# Patient Record
Sex: FEMALE | Race: BLACK OR AFRICAN AMERICAN | NOT HISPANIC OR LATINO | Employment: FULL TIME | URBAN - METROPOLITAN AREA
[De-identification: names, ages, dates, MRNs, and addresses within clinical notes are randomized per-mention and may not be internally consistent; named-entity substitution may affect disease eponyms.]

---

## 2018-10-02 ENCOUNTER — HOSPITAL ENCOUNTER (EMERGENCY)
Facility: HOSPITAL | Age: 28
Discharge: HOME | End: 2018-10-02
Attending: EMERGENCY MEDICINE
Payer: COMMERCIAL

## 2018-10-02 VITALS
TEMPERATURE: 99 F | HEART RATE: 72 BPM | SYSTOLIC BLOOD PRESSURE: 123 MMHG | RESPIRATION RATE: 12 BRPM | WEIGHT: 182.75 LBS | DIASTOLIC BLOOD PRESSURE: 62 MMHG | OXYGEN SATURATION: 100 % | HEIGHT: 66 IN | BODY MASS INDEX: 29.37 KG/M2

## 2018-10-02 DIAGNOSIS — R55 SYNCOPE: Primary | ICD-10-CM

## 2018-10-02 LAB
ALBUMIN SERPL BCP-MCNC: 3.8 G/DL
ALP SERPL-CCNC: 50 U/L
ALT SERPL W/O P-5'-P-CCNC: 14 U/L
ANION GAP SERPL CALC-SCNC: 7 MMOL/L
AST SERPL-CCNC: 16 U/L
B-HCG UR QL: NEGATIVE
BASOPHILS # BLD AUTO: 0 K/UL
BASOPHILS NFR BLD: 0.4 %
BILIRUB SERPL-MCNC: 0.3 MG/DL
BUN SERPL-MCNC: 11 MG/DL
CALCIUM SERPL-MCNC: 9.5 MG/DL
CHLORIDE SERPL-SCNC: 107 MMOL/L
CO2 SERPL-SCNC: 25 MMOL/L
CREAT SERPL-MCNC: 0.8 MG/DL
CTP QC/QA: YES
DIFFERENTIAL METHOD: ABNORMAL
EOSINOPHIL # BLD AUTO: 0.3 K/UL
EOSINOPHIL NFR BLD: 4.6 %
ERYTHROCYTE [DISTWIDTH] IN BLOOD BY AUTOMATED COUNT: 15.3 %
EST. GFR  (AFRICAN AMERICAN): >60 ML/MIN/1.73 M^2
EST. GFR  (NON AFRICAN AMERICAN): >60 ML/MIN/1.73 M^2
GLUCOSE SERPL-MCNC: 99 MG/DL
HCT VFR BLD AUTO: 37.6 %
HGB BLD-MCNC: 12.4 G/DL
LYMPHOCYTES # BLD AUTO: 2.1 K/UL
LYMPHOCYTES NFR BLD: 34.5 %
MCH RBC QN AUTO: 27.7 PG
MCHC RBC AUTO-ENTMCNC: 33 G/DL
MCV RBC AUTO: 84 FL
MONOCYTES # BLD AUTO: 0.8 K/UL
MONOCYTES NFR BLD: 12.8 %
NEUTROPHILS # BLD AUTO: 2.9 K/UL
NEUTROPHILS NFR BLD: 47.7 %
PLATELET # BLD AUTO: 391 K/UL
PMV BLD AUTO: 7.4 FL
POTASSIUM SERPL-SCNC: 3.8 MMOL/L
PROT SERPL-MCNC: 7.4 G/DL
RBC # BLD AUTO: 4.47 M/UL
SODIUM SERPL-SCNC: 139 MMOL/L
TSH SERPL DL<=0.005 MIU/L-ACNC: 0.91 UIU/ML
WBC # BLD AUTO: 6.2 K/UL

## 2018-10-02 PROCEDURE — 93010 ELECTROCARDIOGRAM REPORT: CPT | Mod: ,,, | Performed by: INTERNAL MEDICINE

## 2018-10-02 PROCEDURE — 80053 COMPREHEN METABOLIC PANEL: CPT

## 2018-10-02 PROCEDURE — 25000003 PHARM REV CODE 250: Performed by: EMERGENCY MEDICINE

## 2018-10-02 PROCEDURE — 99285 EMERGENCY DEPT VISIT HI MDM: CPT | Mod: 25

## 2018-10-02 PROCEDURE — 93005 ELECTROCARDIOGRAM TRACING: CPT

## 2018-10-02 PROCEDURE — 84443 ASSAY THYROID STIM HORMONE: CPT

## 2018-10-02 PROCEDURE — 81025 URINE PREGNANCY TEST: CPT | Performed by: PHYSICIAN ASSISTANT

## 2018-10-02 PROCEDURE — 36415 COLL VENOUS BLD VENIPUNCTURE: CPT

## 2018-10-02 PROCEDURE — 85025 COMPLETE CBC W/AUTO DIFF WBC: CPT

## 2018-10-02 RX ORDER — ACETAMINOPHEN 500 MG
1000 TABLET ORAL
Status: COMPLETED | OUTPATIENT
Start: 2018-10-02 | End: 2018-10-02

## 2018-10-02 RX ADMIN — ACETAMINOPHEN 1000 MG: 500 TABLET ORAL at 02:10

## 2018-10-02 NOTE — ED NOTES
Upon discharge, patient is AAOx4, no cardiac or respiratory complications. Follow up care reviewed with patient and has been instructed to return to the ER if needed. Patient verbalized understanding and ambulated to the lobby without difficulty. CHAYO MASSEY

## 2018-10-02 NOTE — ED NOTES
OTILIA Mondragon is at the bedside of patient. Patient has been updated on plan of care and results. No needs or questions at this time.

## 2018-10-02 NOTE — ED PROVIDER NOTES
"Encounter Date: 10/2/2018    SCRIBE #1 NOTE: I, Slime Gambino, am scribing for, and in the presence of, Alanna Cannon PA-C.       History     Chief Complaint   Patient presents with    syncopal episode     States "I think I had a panic attack"       Time seen by provider: 2:12 PM on 10/02/2018    Arnie Mei is a 28 y.o. female with HSV, vaso-vagal syncope (last occurrence ), and ASD who presents to the ED after a syncopal event. Patient states she was in the middle of a panic attack and she fainted. She states that she has had panic attacks before and alike previous episodes she felt "jittery, chest heaviness, and hear my pulse", albeit fainting, which all have resolved at this time. Patient notes that she typically is able to calm herself down and that they have been more frequent lately. She reports that today she fell from a standing position and woke up on the floor and complains of only a headache at this time. Her fall was witnessed and witnesses deny any seizure like activity per the patient. Patient denies any shortness of breath, pain in her calves, lower leg swelling, numbness, weakness, changes in vision, changes in speech, changes in ambulation, cough, dizziness, vomiting, diarrhea, chest pain, or fever. Patient denies being on any medication for anxiety now, however has in the past.       The history is provided by the patient. No  was used.     Review of patient's allergies indicates:  No Known Allergies  Past Medical History:   Diagnosis Date    Chest pain     Herpes simplex without mention of complication     History of Amplatzer atrial septal defect closure 2016    2006    History of atrial septal defect 2016    Migraine     Sickle cell trait     Vaso vagal episode     Vasovagal syncope 2016     Past Surgical History:   Procedure Laterality Date    CARDIAC CATHETERIZATION      for a previous murmur     SECTION      " DELIVERY-[facility] SECTION N/A 5/11/2016    Performed by Julissa Munoz MD at [facility] L&D     Family History   Problem Relation Age of Onset    Arthritis Father     Hypertension Father     Stroke Father     Heart disease Father      Social History     Tobacco Use    Smoking status: Never Smoker    Smokeless tobacco: Never Used   Substance Use Topics    Alcohol use: No    Drug use: No     Review of Systems   Constitutional: Negative for chills and fever.   HENT: Negative for drooling, sore throat and voice change.    Eyes: Negative for photophobia and visual disturbance.   Respiratory: Positive for chest tightness (resolved, associated to anxiety). Negative for cough and shortness of breath.    Cardiovascular: Negative for chest pain, palpitations and leg swelling.   Gastrointestinal: Negative for abdominal pain, diarrhea, nausea and vomiting.   Genitourinary: Negative for dysuria and hematuria.   Musculoskeletal: Negative for back pain and neck pain.   Skin: Negative for rash.   Neurological: Positive for syncope and headaches. Negative for dizziness, tremors, seizures, speech difficulty, weakness and numbness.   Hematological: Does not bruise/bleed easily.   Psychiatric/Behavioral: Negative for confusion. The patient is nervous/anxious.        Physical Exam     Initial Vitals [10/02/18 1345]   BP Pulse Resp Temp SpO2   126/80 97 12 98.6 °F (37 °C) 97 %      MAP       --         Physical Exam    Nursing note and vitals reviewed.  Constitutional: She appears well-developed and well-nourished. She is cooperative.  Non-toxic appearance. She does not have a sickly appearance.   HENT:   Head: Normocephalic and atraumatic.   Right Ear: External ear normal.   Left Ear: External ear normal.   Nose: Nose normal.   Mouth/Throat: Oropharynx is clear and moist.   Eyes: Conjunctivae, EOM and lids are normal. Pupils are equal, round, and reactive to light.   Neck: Normal range of motion and full passive range of motion  without pain. Neck supple.   Cardiovascular: Normal rate, regular rhythm, normal heart sounds and intact distal pulses. Exam reveals no gallop and no friction rub.    No murmur heard.  Pulmonary/Chest: Breath sounds normal. She has no wheezes. She has no rhonchi. She has no rales.   Abdominal: Soft. Normal appearance. There is no tenderness. There is no rigidity, no rebound and no guarding.   Musculoskeletal: She exhibits no edema or tenderness.   Neurological: She is alert and oriented to person, place, and time. She has normal strength. No cranial nerve deficit or sensory deficit. GCS score is 15. GCS eye subscore is 4. GCS verbal subscore is 5. GCS motor subscore is 6.   Negative Romberg. Normal gait. 5/5 strength and normal sensation of all extremities. CN III-XII intact.    Skin: Skin is warm, dry and intact. Capillary refill takes less than 2 seconds. No rash noted.         ED Course   Procedures  Labs Reviewed   CBC W/ AUTO DIFFERENTIAL - Abnormal; Notable for the following components:       Result Value    RDW 15.3 (*)     Platelets 391 (*)     MPV 7.4 (*)     All other components within normal limits   COMPREHENSIVE METABOLIC PANEL - Abnormal; Notable for the following components:    Alkaline Phosphatase 50 (*)     Anion Gap 7 (*)     All other components within normal limits   TSH   POCT URINE PREGNANCY        ECG Results          EKG 12-lead (Preliminary result)  Result time 10/02/18 14:23:38    ED Interpretation by Chase Villafana MD (10/02/18 14:23:38)    Sinus rhythm 73 beats per minute no ST elevation or depression or T-wave inversion                            Imaging Results          CT Head Without Contrast (Final result)  Result time 10/02/18 14:55:29    Final result by Ronny Paulino MD (10/02/18 14:55:29)                 Impression:      No acute intracranial process.  No significant change.      Electronically signed by: Ronny Paulino MD  Date:    10/02/2018  Time:    14:55              Narrative:    EXAMINATION:  CT HEAD WITHOUT CONTRAST    CLINICAL HISTORY:  fall hit head;    TECHNIQUE:  Low dose axial images were obtained through the head.  Coronal and sagittal reformations were also performed. Contrast was not administered.    COMPARISON:  04/07/2015    FINDINGS:  Gray-white differentiation is well maintained.  There is no intracranial hemorrhage.  There is no mass or midline shift.  The ventricles are nondilated.  The calvarium is intact.                                 Medical Decision Making:   History:   Old Medical Records: I decided to obtain old medical records.  Clinical Tests:   Lab Tests: Ordered and Reviewed  Radiological Study: Ordered and Reviewed  Medical Tests: Ordered and Reviewed       APC / Resident Notes:   Urgent evaluation of a 28-year-old female who presents with a syncopal episode prior to arrival.  At this time she reports a headache but no other complaints. Her neurological exam is normal. She denies any symptoms at this time. She reports history of vasovagal syncope. She also reports history of anxiety. She is not tachycardic or hypoxic. Doubt PE. EKG shows no acute changes. Labs are unremarkable. Discussed results with patient. Return precautions given. Based on my clinical evaluation, I do not appreciate any immediate, emergent, or life threatening condition or etiology that warrants additional workup today and feel that the patient can be discharged with close follow up care.  Patient is to follow up with their primary care provider. Case was discussed with Dr. Villafana who has evaluated the patient and is in agreement with the plan of care. All questions answered.          Scribe Attestation:   Scribe #1: I performed the above scribed service and the documentation accurately describes the services I performed. I attest to the accuracy of the note.    Attending Attestation:     Physician Attestation Statement for NP/PA:   I have conducted a face to face encounter  with this patient in addition to the NP/PA, due to Medical Complexity    Other NP/PA Attestation Additions:      Medical Decision Making: I provided a face to face evaluation of this patient.  I discussed the patient's care with Advanced Practice Clinician.  I reviewed their note and agree with the history, physical, assessment, diagnosis, treatment, and discharge plan provided by the Advanced Practice Clinician. My overall impression is syncope.  The patient has been instructed to follow up with their physician or the one provided as well as specific return precautions.            I, Alanna Back PA-C, personally performed the services described in this documentation. All medical record entries made by the scribe were at my direction and in my presence.  I have reviewed the chart and agree that the record reflects my personal performance and is accurate and complete. Alanna Back PA-C.  5:17 PM 10/02/2018          ED Course as of Oct 02 1730   Tue Oct 02, 2018   1422 Preg Test, Ur: Negative [EF]   1422 BP: 126/80 [EF]   1422 Temp: 98.6 °F (37 °C) [EF]   1422 Temp src: Oral [EF]   1422 Pulse: 97 [EF]   1422 Resp: 12 [EF]   1422 Resp: 12 [EF]   1422 SpO2: 97 % [EF]   1517 CT Head Without Contrast [EF]      ED Course User Index  [EF] Chase Villafana MD     Clinical Impression:   The encounter diagnosis was Syncope.      Disposition:   Disposition: Discharged  Condition: Stable              Alanna Back PA-C  10/02/18 1730       Chase Villafana MD  10/02/18 1740

## 2018-10-02 NOTE — ED NOTES
"Presents to the ER with c/o syncopal episode that occurred just prior to arrival. Patient reports a history of vasovagal syndrome since the age of 12. Patient reports syncopal episodes are usually associated with panic attacks. Patient started to feel jittery, anxious and "Could hear my heart beat." before episode. Patient was told by a bystander that she fell back hitting her head on the concrete. Patient reports a brief LOC. Associated complaints are generalized headache. Patient moves all extremities without difficulty. Mucous membranes are pink and moist. Skin is warm, dry and intact. Lungs are clear bilaterally, respirations are regular and unlabored. Denies cough, congestion, rhinorrhea or SOB. BS active x4, no tenderness with palpation, abd is soft and not distended. Denies any appetite or activity change. S1S2, capillary refill is < 2 seconds. Denies dysuria, difficulty urinating, frequency, numbness, tingling or weakness. SHILPA JINS    "

## 2018-10-02 NOTE — DISCHARGE INSTRUCTIONS
Follow up with your primary care provider.  For worsening symptoms, chest pain, shortness of breath, increased abdominal pain, high grade fever, stroke or stroke like symptoms, immediately go to the nearest Emergency Room or call 911 as soon as possible.

## 2018-11-19 ENCOUNTER — OFFICE VISIT (OUTPATIENT)
Dept: FAMILY MEDICINE | Facility: CLINIC | Age: 28
End: 2018-11-19
Payer: COMMERCIAL

## 2018-11-19 VITALS
HEIGHT: 66 IN | OXYGEN SATURATION: 97 % | WEIGHT: 187.31 LBS | HEART RATE: 66 BPM | RESPIRATION RATE: 14 BRPM | DIASTOLIC BLOOD PRESSURE: 84 MMHG | TEMPERATURE: 99 F | SYSTOLIC BLOOD PRESSURE: 130 MMHG | BODY MASS INDEX: 30.1 KG/M2

## 2018-11-19 DIAGNOSIS — Z23 NEED FOR INFLUENZA VACCINATION: ICD-10-CM

## 2018-11-19 DIAGNOSIS — Z00.00 ROUTINE HEALTH MAINTENANCE: Primary | ICD-10-CM

## 2018-11-19 PROCEDURE — 90471 IMMUNIZATION ADMIN: CPT | Mod: ,,, | Performed by: NURSE PRACTITIONER

## 2018-11-19 PROCEDURE — 99385 PREV VISIT NEW AGE 18-39: CPT | Mod: 25,,, | Performed by: NURSE PRACTITIONER

## 2018-11-19 PROCEDURE — 90686 IIV4 VACC NO PRSV 0.5 ML IM: CPT | Mod: ,,, | Performed by: NURSE PRACTITIONER

## 2018-11-19 RX ORDER — VALACYCLOVIR HYDROCHLORIDE 500 MG/1
1 TABLET, FILM COATED ORAL 2 TIMES DAILY
Refills: 0 | COMMUNITY
Start: 2018-10-30 | End: 2018-12-18

## 2018-11-19 NOTE — PROGRESS NOTES
SUBJECTIVE:      Patient ID: Arnie Mei is a 28 y.o. female.    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 Cheatham ([facility]) s/p ASD closure but didn't see him this year, diagnosed after closure with vaso-vagal syncope    Saw Dr. Go in past for migraines, was placed on sumatriptan but hasn't taken in quite some time, takes OTC meds with mild relief when she gets HA now    Discussed outstanding health maintenance - upcoming pap with Dr. Munoz 18        Past Surgical History:   Procedure Laterality Date    CARDIAC CATHETERIZATION      for a previous murmur     SECTION  ,,    COLON SURGERY  2014    DELIVERY-CEASAREAN SECTION N/A 2016    [facility] at [facility]D    TUBAL LIGATION  2016     Family History   Problem Relation Age of Onset    Arthritis Father     Hypertension Father     Stroke Father     Heart disease Father     No Known Problems Mother     Asthma Sister     Asthma Daughter     Cancer Paternal Grandmother         breast and bone cancer    Cancer Paternal Grandfather         Colon cancer    Diabetes Paternal Grandfather     No Known Problems Daughter     No Known Problems Daughter       Social History     Socioeconomic History    Marital status: Single     Spouse name: None    Number of children: None    Years of education: None    Highest education level: None   Social Needs    Financial resource strain: None    Food insecurity - worry: None    Food insecurity - inability: None    Transportation needs - medical: None    Transportation needs -  non-medical: None   Occupational History    None   Tobacco Use    Smoking status: Never Smoker    Smokeless tobacco: Never Used   Substance and Sexual Activity    Alcohol use: No    Drug use: No    Sexual activity: Yes     Partners: Male     Birth control/protection: None   Other Topics Concern    None   Social History Narrative    None     Current Outpatient Medications   Medication Sig Dispense Refill    valACYclovir (VALTREX) 500 MG tablet Take 1 tablet by mouth 2 (two) times daily.  0     No current facility-administered medications for this visit.      Review of patient's allergies indicates:  No Known Allergies   Past Medical History:   Diagnosis Date    Anxiety     Chest pain     Herpes simplex without mention of complication     History of Amplatzer atrial septal defect closure 2016    2006    History of atrial septal defect 2016    Migraine     Sickle cell trait     Vaso vagal episode     Vasovagal syncope 2016     Past Surgical History:   Procedure Laterality Date    CARDIAC CATHETERIZATION      for a previous murmur     SECTION  ,,    COLON SURGERY  2014    DELIVERY-CEASAREAN SECTION N/A 2016    [email or web address] L[facility]D    TUBAL LIGATION  2016       Review of Systems   Constitutional: Negative for activity change, appetite change, fatigue and unexpected weight change.   HENT: Negative for congestion, ear pain, hearing loss, postnasal drip, sinus pressure, sinus pain, sneezing and sore throat.    Eyes: Negative for photophobia and pain.   Respiratory: Negative for cough, chest tightness, shortness of breath and wheezing.    Cardiovascular: Negative for chest pain, palpitations and leg swelling.   Gastrointestinal: Negative for abdominal distention, abdominal pain, blood in stool, constipation, diarrhea, nausea and vomiting.   Endocrine: Negative for cold intolerance, heat intolerance, polydipsia and polyuria.  "  Genitourinary: Negative for difficulty urinating, dysuria, flank pain, frequency, hematuria, pelvic pain and urgency.   Musculoskeletal: Negative for arthralgias, back pain, joint swelling, myalgias and neck pain.   Skin: Negative for pallor.   Allergic/Immunologic: Negative for environmental allergies and food allergies.   Neurological: Positive for syncope (in past, states RT stress/anxiety) and headaches. Negative for dizziness, weakness, light-headedness and numbness.   Hematological: Does not bruise/bleed easily.   Psychiatric/Behavioral: Negative for agitation, confusion, decreased concentration and sleep disturbance. The patient is nervous/anxious.       OBJECTIVE:      Vitals:    11/19/18 1033   BP: 130/84   Pulse: 66   Resp: 14   Temp: 98.6 °F (37 °C)   SpO2: 97%   Weight: 85 kg (187 lb 4.8 oz)   Height: 5' 6" (1.676 m)     Physical Exam   Constitutional: She is oriented to person, place, and time. Vital signs are normal. She appears well-developed and well-nourished. No distress.   obese   HENT:   Head: Normocephalic and atraumatic.   Right Ear: Hearing normal.   Left Ear: Hearing normal.   Nose: Nose normal. No rhinorrhea.   Mouth/Throat: Mucous membranes are normal.   Eyes: Conjunctivae and lids are normal. Pupils are equal, round, and reactive to light. Right eye exhibits no discharge. Left eye exhibits no discharge. Right conjunctiva is not injected. Left conjunctiva is not injected. Right pupil is round and reactive. Left pupil is round and reactive. Pupils are equal.   Neck: Trachea normal and normal range of motion. Neck supple. No JVD present. No tracheal deviation present. No thyromegaly present.   Cardiovascular: Normal rate, regular rhythm, normal heart sounds and intact distal pulses. Exam reveals no gallop and no friction rub.   No murmur heard.  Pulses:       Radial pulses are 2+ on the right side, and 2+ on the left side.   Pulmonary/Chest: Effort normal and breath sounds normal. No " stridor. No respiratory distress. She has no decreased breath sounds. She has no wheezes. She has no rhonchi. She has no rales.   Abdominal: Soft. Bowel sounds are normal. She exhibits no distension. There is no tenderness. There is no rigidity and no guarding.   Musculoskeletal: Normal range of motion. She exhibits no edema.   Lymphadenopathy:     She has no cervical adenopathy.   Neurological: She is alert and oriented to person, place, and time. She has normal strength. She displays no atrophy. She displays a negative Romberg sign. Coordination and gait normal. GCS eye subscore is 4. GCS verbal subscore is 5. GCS motor subscore is 6.   Skin: Skin is warm and dry. Capillary refill takes less than 2 seconds. No lesion and no rash noted. No cyanosis. No pallor.   Psychiatric: She has a normal mood and affect. Her speech is normal and behavior is normal. Judgment and thought content normal. Cognition and memory are normal. She is attentive.   Nursing note and vitals reviewed.     Assessment:       1. Routine health maintenance    2. Need for influenza vaccination        Plan:       Routine health maintenance  -     Lipid panel; Future; Expected date: 11/19/2018  - reviewed CBC, CMP, TSH from recent hospital stay for syncopal episode    Need for influenza vaccination  -     Influenza - Quadrivalent (3 years & older) (PF)    Patient doesn't want to address anxiety or HA issues at this time. Wants to wait for lab results. Will revisit at next appt.    Follow-up in about 4 weeks (around 12/17/2018) for anxiety & HA med assess.      11/23/2018 AMALIA Gonzalez, FNP-C

## 2018-11-19 NOTE — PATIENT INSTRUCTIONS
Prevention Guidelines, Women Ages 18 to 39  Screening tests and vaccines are an important part of managing your health. Health counseling is essential, too. Below are guidelines for these, for women ages 18 to 39. Talk with your healthcare provider to make sure youre up-to-date on what you need.  Screening Who needs it How often   Alcohol misuse All women in this age group At routine exams   Blood pressure All women in this age group Every 2 years if your blood pressure is less than 120/80 mm Hg; yearly if your systolic blood pressure is 120 to 139 mm Hg, or your diastolic blood pressure reading is 80 to 89 mm Hg   Breast cancer All women in this age group should talk with their healthcare providers about the need for clinical breast exams (CBE)1 Clinical breast exam every 3 years1   Cervical cancer Women ages 21 and older Women between ages 21 and 29 should have a Pap test every 3 years; women between ages 30 and 65 are advised to have a Pap test plus an HPV test every 5 years   Chlamydia Sexually active women ages 24 and younger, and women at increased risk for infection Every 3 years if you're at risk or have symptoms   Depression All women in this age group At routine exams   Diabetes mellitus, type 2 Adults with no symptoms who are overweight or obese and have 1 or more other risk factors for diabetes At least every 3 years   Gonorrhea Sexually active women at increased risk for infection At routine exams   Hepatitis C Anyone at increased risk At routine exams   HIV All women At routine exams   Obesity All women in this age group At routine exams   Syphilis Women at increased risk for infection - talk with your healthcare provider At routine exams   Tuberculosis Women at increased risk for infection - talk with your healthcare provider Ask your healthcare provider   Vision All women in this age group At least 1 complete exam in your 20s, and 2 in your 30s   Vaccine2 Who needs it How often   Chickenpox  (varicella) All women in this age group who have no record of this infection or vaccine 2 doses; the second dose should be given 4 to 8 weeks after the first dose   Hepatitis A Women at increased risk for infection - talk with your healthcare provider 2 doses given at least 6 months apart   Hepatitis B Women at increased risk for infection - talk with your healthcare provider 3 doses over 6 months; second dose should be given 1 month after the first dose; the third dose should be given at least 2 months after the second dose and at least 4 months after the first dose   Haemophilus influenzae Type B (HIB) Women at increased risk for infection - talk with your healthcare provider 1 to 3 doses   Human papillomavirus (HPV) All women in this age group up to age 26 3 doses; the second dose should be given 1 to 2 months after the first dose and the third dose given 6 months after the first dose   Influenza (flu) All women in this age group Once a year   Measles, mumps, rubella (MMR) All women in this age group who have no record of these infections or vaccines 1 or 2 doses   Meningococcal Women at increased risk for infection - talk with your healthcare provider 1 or more doses   Pneumococcal conjugate vaccine (PCV13) and pneumococcal polysaccharide vaccine (PPSV23) Women at increased risk for infection - talk with your healthcare provider PCV13: 1 dose ages 19 to 65 (protects against 13 types of pneumococcal bacteria)  PPSV23: 1 to 2 doses through age 64, or 1 dose at 65 or older (protects against 23 types of pneumococcal bacteria)      Tetanus/diphtheria/pertussis (Td/Tdap) booster All women in this age group Td every 10 years, or a one-time dose of Tdap instead of a Td booster after age 18, then Td every 10 years   Counseling Who needs it How often   BRCA gene mutation testing for breast and ovarian cancer susceptibility Women with increased risk for having gene mutation When your risk is known   Breast cancer and  chemoprevention Women at high risk for breast cancer When your risk is known   Diet and exercise Women who are overweight or obese When diagnosed, and then at routine exams   Domestic violence Women at the age in which they are able to have children At routine exams   Sexually transmitted infection prevention Women who are sexually active At routine exams   Skin cancer Prevention of skin cancer in fair-skinned adults through age 24 At routine exams   Use of tobacco and the health effects it can cause All women in this age group Every visit   1According to the ACS, women ages 20 to 39 years should have a clinical breast exam (CBE) as part of their routine health exam every 3 years. Breast self-exams are an option for women starting in their 20s. But the  USPSTF does not recommend CBE.  2Those who are 18 years old and not up-to-date on their childhood vaccines should get all appropriate catch-up vaccines recommended by the CDC.  Date Last Reviewed: 8/27/2015  © 6793-4589 [employer]. [address] [place] [address], [place] [address]. All rights reserved. This information is not intended as a substitute for professional medical care. Always follow your healthcare professional's instructions.

## 2018-12-03 ENCOUNTER — TELEPHONE (OUTPATIENT)
Dept: FAMILY MEDICINE | Facility: CLINIC | Age: 28
End: 2018-12-03

## 2018-12-18 ENCOUNTER — OFFICE VISIT (OUTPATIENT)
Dept: FAMILY MEDICINE | Facility: CLINIC | Age: 28
End: 2018-12-18
Payer: COMMERCIAL

## 2018-12-18 VITALS
HEIGHT: 66 IN | TEMPERATURE: 99 F | RESPIRATION RATE: 14 BRPM | WEIGHT: 190.69 LBS | BODY MASS INDEX: 30.65 KG/M2 | HEART RATE: 90 BPM | DIASTOLIC BLOOD PRESSURE: 74 MMHG | SYSTOLIC BLOOD PRESSURE: 118 MMHG | OXYGEN SATURATION: 98 %

## 2018-12-18 DIAGNOSIS — F41.9 ANXIETY: Primary | ICD-10-CM

## 2018-12-18 DIAGNOSIS — G44.209 TENSION-TYPE HEADACHE, NOT INTRACTABLE, UNSPECIFIED CHRONICITY PATTERN: ICD-10-CM

## 2018-12-18 PROCEDURE — 99214 OFFICE O/P EST MOD 30 MIN: CPT | Mod: ,,, | Performed by: NURSE PRACTITIONER

## 2018-12-18 PROCEDURE — 3008F BODY MASS INDEX DOCD: CPT | Mod: ,,, | Performed by: NURSE PRACTITIONER

## 2018-12-18 RX ORDER — VALACYCLOVIR HYDROCHLORIDE 500 MG/1
500 TABLET, FILM COATED ORAL 2 TIMES DAILY
COMMUNITY
End: 2019-04-02

## 2018-12-18 RX ORDER — BUSPIRONE HYDROCHLORIDE 7.5 MG/1
7.5 TABLET ORAL 3 TIMES DAILY PRN
Qty: 30 TABLET | Refills: 2 | Status: SHIPPED | OUTPATIENT
Start: 2018-12-18 | End: 2019-01-08 | Stop reason: SDUPTHER

## 2018-12-18 RX ORDER — CHLORHEXIDINE GLUCONATE 40 MG/ML
SOLUTION TOPICAL
COMMUNITY
End: 2019-04-02

## 2018-12-18 RX ORDER — MUPIROCIN 20 MG/G
OINTMENT TOPICAL
Refills: 3 | COMMUNITY
Start: 2018-12-04 | End: 2019-04-02

## 2018-12-18 RX ORDER — SUMATRIPTAN 50 MG/1
50 TABLET, FILM COATED ORAL
COMMUNITY
End: 2019-04-02

## 2018-12-18 NOTE — PATIENT INSTRUCTIONS
Understanding Anxiety Disorders  Almost everyone gets nervous now and then. Its normal to have knots in your stomach before a test, or for your heart to race on a first date. But an anxiety disorder is much more than a case of nerves. In fact, its symptoms may be overwhelming. But treatment can relieve many of these symptoms. Talking to your healthcare provider is the first step.    What are anxiety disorders?  An anxiety disorder causes intense feelings of panic and fear. These feelings may arise for no apparent reason. And they tend to recur again and again. They may prevent you from coping with life and cause you great distress. As a result, you may avoid anything that triggers your fear. In extreme cases, you may never leave the house. Anxiety disorders may cause other symptoms, such as:  · Obsessive thoughts you cant control  · Constant nightmares or painful thoughts of the past  · Nausea, sweating, and muscle tension  · Trouble sleeping or concentrating  What causes anxiety disorders?  Anxiety disorders tend to run in families. For some people, childhood abuse or neglect may play a role. For others, stressful life events or trauma may trigger anxiety disorders. Anxiety can trigger low self-esteem and poor coping skills.  Common anxiety disorders  · Panic disorder. This causes an intense fear of being in danger.  · Phobias. These are extreme fears of certain objects, places, or events.  · Obsessive-compulsive disorder. This causes you to have unwanted thoughts and urges. You also may perform certain actions over and over.  · Posttraumatic stress disorder. This occurs in people who have survived a terrible ordeal. It can cause nightmares and flashbacks about the event.  · Generalized anxiety disorder. This causes constant worry that can greatly disrupt your life.   Getting better  You may believe that nothing can help you. Or, you might fear what others may think. But most anxiety symptoms can be eased.  Having an anxiety disorder is nothing to be ashamed of. Most people do best with treatment that combines medicine and therapy. These arent cures. But they can help you live a healthier life.  Date Last Reviewed: 2/1/2017  © 1895-5929 [employer]. [address] [place] [address], [place] [address]. All rights reserved. This information is not intended as a substitute for professional medical care. Always follow your healthcare professional's instructions.

## 2018-12-18 NOTE — PROGRESS NOTES
SUBJECTIVE:      Patient ID: Arnie Mei is a 28 y.o. female.    Chief Complaint: Anxiety (migraines)    F/U for lipid review - fine    C/o ongoing anxiety & HAs - wants to try to address anxiety to see if that relieves HA frequency    Discussed outstanding health maintenance - saw   for pap      Anxiety   Presents for initial visit. Onset was 1 to 6 months ago. The problem has been gradually worsening. Symptoms include irritability, nervous/anxious behavior and palpitations. Patient reports no chest pain, confusion, decreased concentration, dizziness, nausea or shortness of breath. Symptoms occur most days. The severity of symptoms is mild. The symptoms are aggravated by family issues. The quality of sleep is good.     Her past medical history is significant for arrhythmia. There is no history of depression or suicide attempts.   Headache    This is a chronic problem. The current episode started more than 1 year ago. The problem occurs intermittently. The pain does not radiate. The pain quality is similar to prior headaches. The quality of the pain is described as aching, dull and throbbing. The pain is moderate. Associated symptoms include phonophobia and photophobia. Pertinent negatives include no abdominal pain, back pain, coughing, dizziness, ear pain, eye pain, hearing loss, nausea, neck pain, numbness, sinus pressure, sore throat, vomiting or weakness. The symptoms are aggravated by emotional stress, fatigue and menstrual cycle. She has tried triptans and NSAIDs for the symptoms. The treatment provided moderate relief. Her past medical history is significant for migraine headaches and obesity.       Past Surgical History:   Procedure Laterality Date    CARDIAC CATHETERIZATION  2016    ASD closure     SECTION  ,,2013    COLON SURGERY  2014    DELIVERY-CEASAREAN SECTION N/A 2016    [facility]    TUBAL LIGATION  2016     Family  History   Problem Relation Age of Onset    Arthritis Father     Hypertension Father     Stroke Father     Heart disease Father     No Known Problems Mother     Asthma Sister     Asthma Daughter     Cancer Paternal Grandmother         breast and bone cancer    Cancer Paternal Grandfather         Colon cancer    Diabetes Paternal Grandfather     No Known Problems Daughter     No Known Problems Daughter       Social History     Socioeconomic History    Marital status: Single     Spouse name: None    Number of children: 3    Years of education: None    Highest education level: None   Social Needs    Financial resource strain: None    Food insecurity - worry: None    Food insecurity - inability: None    Transportation needs - medical: None    Transportation needs - non-medical: None   Occupational History    Occupation:    Tobacco Use    Smoking status: Never Smoker    Smokeless tobacco: Never Used   Substance and Sexual Activity    Alcohol use: No    Drug use: No    Sexual activity: Yes     Partners: Male     Birth control/protection: None   Other Topics Concern    None   Social History Narrative    None     Current Outpatient Medications   Medication Sig Dispense Refill    chlorhexidine (HIBICLENS) 4 % external liquid Hibiclens 4 % topical liquid   use 1 to 2 x a week in the shower as needed      mupirocin (BACTROBAN) 2 % ointment   3    sumatriptan (IMITREX) 50 MG tablet Take 50 mg by mouth as needed. HAs      valACYclovir (VALTREX) 500 MG tablet Take 500 mg by mouth 2 (two) times daily.      busPIRone (BUSPAR) 7.5 MG tablet Take 1 tablet (7.5 mg total) by mouth 3 (three) times daily as needed. 30 tablet 2     No current facility-administered medications for this visit.      Review of patient's allergies indicates:  No Known Allergies   Past Medical History:   Diagnosis Date    Anxiety     Chest pain     Herpes simplex without mention of complication     History of  Amplatzer atrial septal defect closure 2016    2006    History of atrial septal defect 2016    Migraine     Sickle cell trait     Vaso vagal episode     Vasovagal syncope 2016     Past Surgical History:   Procedure Laterality Date    CARDIAC CATHETERIZATION  2016    ASD closure     SECTION  ,,    COLON SURGERY  2014    DELIVERY-CEASAREAN SECTION N/A 2016    [facility]    TUBAL LIGATION         Review of Systems   Constitutional: Positive for irritability. Negative for activity change, appetite change, fatigue and unexpected weight change.   HENT: Negative for congestion, ear pain, hearing loss, postnasal drip, sinus pressure, sinus pain, sneezing and sore throat.    Eyes: Positive for photophobia. Negative for pain.   Respiratory: Negative for cough, chest tightness, shortness of breath and wheezing.    Cardiovascular: Positive for palpitations. Negative for chest pain and leg swelling.   Gastrointestinal: Negative for abdominal distention, abdominal pain, blood in stool, constipation, diarrhea, nausea and vomiting.   Endocrine: Negative for cold intolerance, heat intolerance, polydipsia and polyuria.   Genitourinary: Negative for difficulty urinating, dysuria, flank pain, frequency, hematuria, pelvic pain and urgency.   Musculoskeletal: Negative for arthralgias, back pain, joint swelling, myalgias and neck pain.   Skin: Negative for pallor.   Allergic/Immunologic: Negative for environmental allergies and food allergies.   Neurological: Positive for headaches. Negative for dizziness, weakness, light-headedness and numbness.   Hematological: Does not bruise/bleed easily.   Psychiatric/Behavioral: Negative for agitation, confusion, decreased concentration and sleep disturbance. The patient is nervous/anxious.       OBJECTIVE:      Vitals:    18 1114   BP: 118/74   Pulse: 90   Resp: 14   Temp: 98.7 °F (37.1 °C)   SpO2: 98%  "  Weight: 86.5 kg (190 lb 11.2 oz)   Height: 5' 6" (1.676 m)     Physical Exam   Constitutional: She is oriented to person, place, and time. Vital signs are normal. She appears well-developed and well-nourished. No distress.   obese   HENT:   Head: Normocephalic and atraumatic.   Right Ear: Hearing normal.   Left Ear: Hearing normal.   Nose: Nose normal. No rhinorrhea.   Mouth/Throat: Mucous membranes are normal.   Eyes: Conjunctivae and lids are normal. Pupils are equal, round, and reactive to light. Right eye exhibits no discharge. Left eye exhibits no discharge. Right conjunctiva is not injected. Left conjunctiva is not injected. Right pupil is round and reactive. Left pupil is round and reactive. Pupils are equal.   Neck: Trachea normal and normal range of motion. Neck supple. No JVD present. No tracheal deviation present. No thyromegaly present.   Cardiovascular: Normal rate, regular rhythm, normal heart sounds and intact distal pulses. Exam reveals no gallop and no friction rub.   No murmur heard.  Pulses:       Radial pulses are 2+ on the right side, and 2+ on the left side.   Pulmonary/Chest: Effort normal and breath sounds normal. No stridor. No respiratory distress. She has no decreased breath sounds. She has no wheezes. She has no rhonchi. She has no rales.   Abdominal: Soft. Bowel sounds are normal. She exhibits no distension. There is no tenderness. There is no rigidity and no guarding.   Musculoskeletal: Normal range of motion. She exhibits no edema.   Lymphadenopathy:     She has no cervical adenopathy.   Neurological: She is alert and oriented to person, place, and time. She has normal strength. She displays no atrophy. She displays a negative Romberg sign. Coordination and gait normal.   Skin: Skin is warm and dry. Capillary refill takes less than 2 seconds. No lesion and no rash noted. No cyanosis. No pallor.   Psychiatric: She has a normal mood and affect. Her speech is normal and behavior is " normal. Judgment and thought content normal. Cognition and memory are normal. She is attentive.   Nursing note and vitals reviewed.     Assessment:       1. Anxiety    2. Tension-type headache, not intractable, unspecified chronicity pattern    3. BMI 30.0-30.9,adult        Plan:       Anxiety   -     busPIRone (BUSPAR) 7.5 MG tablet; Take 1 tablet (7.5 mg total) by mouth 3 (three) times daily as needed.  Dispense: 30 tablet; Refill: 2   - will request records from Dr. Cheatham for prior cardiac work-up/diagnoses     Tension-type headache, not intractable, unspecified chronicity pattern   -wants to continue to take OTC meds until she sees how buspar is working for anxiety    BMI 30.0-30.9,adult   -The patient is asked to make an attempt to improve diet and exercise patterns to aid in medical management of this problem.          Follow-up in about 4 weeks (around 1/15/2019) for anxiety recheck.      12/23/2018 Amparo Monroe, AMALIA, FNP-C

## 2019-01-08 DIAGNOSIS — F41.9 ANXIETY: ICD-10-CM

## 2019-01-08 RX ORDER — BUSPIRONE HYDROCHLORIDE 7.5 MG/1
TABLET ORAL
Qty: 90 TABLET | Refills: 0 | Status: SHIPPED | OUTPATIENT
Start: 2019-01-08 | End: 2019-04-02

## 2019-04-02 ENCOUNTER — OFFICE VISIT (OUTPATIENT)
Dept: FAMILY MEDICINE | Facility: CLINIC | Age: 29
End: 2019-04-02
Payer: COMMERCIAL

## 2019-04-02 DIAGNOSIS — F41.9 ANXIETY: ICD-10-CM

## 2019-04-02 DIAGNOSIS — R55 SYNCOPE, UNSPECIFIED SYNCOPE TYPE: Primary | ICD-10-CM

## 2019-04-02 PROCEDURE — 99214 PR OFFICE/OUTPT VISIT, EST, LEVL IV, 30-39 MIN: ICD-10-PCS | Mod: ,,, | Performed by: NURSE PRACTITIONER

## 2019-04-02 PROCEDURE — 99214 OFFICE O/P EST MOD 30 MIN: CPT | Mod: ,,, | Performed by: NURSE PRACTITIONER

## 2019-04-02 PROCEDURE — 3008F PR BODY MASS INDEX (BMI) DOCUMENTED: ICD-10-PCS | Mod: ,,, | Performed by: NURSE PRACTITIONER

## 2019-04-02 PROCEDURE — 3008F BODY MASS INDEX DOCD: CPT | Mod: ,,, | Performed by: NURSE PRACTITIONER

## 2019-04-02 NOTE — PROGRESS NOTES
"    SUBJECTIVE:      Patient ID: Arnie Mei is a 28 y.o. female.    Chief Complaint: No chief complaint on file.    Here for recent syncopal episode at home & 2 near syncopal episodes (one when she blew her nose, the other in the AM upon waking) over the last few weeks - states she "jumped out of bed" last Thursday to get her girls ready for school & began to feel light-headed, no prior indication other than light-headedness, she stood still in the hopes it would resolve but states she passed out anyway, her oldest daughter dialed 911, EMS came & evaluated her - vitals & BS were fine, EMS recommended she go to ED which she refused - states she told them this has been an intermittent problem since childhood    states she didn't have bowel/bladder incontinence during this episode but had to use restroom (urinate) when she regained consciousness, in the past she has never had bowel/bladder incontinence but states she has had to urinate or defecate upon return of consciousness with each episode, episodes happen rarely, last was several years ago, has been unable to find a specific trigger, doesn't feel she is stressed or anxious although she admits that was a trigger in the past, recently got remarried & moved into another house, still employed as a , states when the syncopal episode happened she was recovering from URI, wanted to be seen as this is the 1st time her girls witnessed episode & it worried her    has just started "lighten up" weight loss program at gym where she is doing cardio/weight training/diet/increased water intake, states this isn't her 1st time doing this program though & she didn't have problems with prior sessions    States she just saw Dr. Deleon within the past 2 months & all was fine    Anxiety   Presents for initial visit. Onset was 1 to 6 months ago. The problem has been gradually worsening. Symptoms include irritability and nervous/anxious behavior. Patient reports no " chest pain, confusion, decreased concentration, dizziness, nausea, palpitations or shortness of breath. Symptoms occur occasionally. The severity of symptoms is mild. The quality of sleep is good.     Her past medical history is significant for arrhythmia. There is no history of depression or suicide attempts. (ASD repair) Past treatments include non-benzodiazephine anxiolytics. The treatment provided mild relief. Compliance with prior treatments has been variable.   Loss of Consciousness   This is a recurrent problem. The current episode started in the past 7 days. The problem occurs rarely. The symptoms are aggravated by standing. Associated symptoms include headaches and light-headedness. Pertinent negatives include no abdominal pain, auditory change, aura, back pain, bladder incontinence, bowel incontinence, chest pain, clumsiness, confusion, diaphoresis, dizziness, fever, focal sensory loss, focal weakness, malaise/fatigue, nausea, palpitations, slurred speech, vertigo, visual change, vomiting or weakness. Treatments tried: rest. The treatment provided mild relief. Her past medical history is significant for arrhythmia. ASD repair       Past Surgical History:   Procedure Laterality Date    CARDIAC CATHETERIZATION  2016    ASD closure     SECTION  ,,    COLON SURGERY  2014    DELIVERY-CEASAREAN SECTION N/A 2016    [facility]    TUBAL LIGATION  2016     Family History   Problem Relation Age of Onset    Arthritis Father     Hypertension Father     Stroke Father     Heart disease Father     No Known Problems Mother     Asthma Sister     Asthma Daughter     Cancer Paternal Grandmother         breast and bone cancer    Cancer Paternal Grandfather         Colon cancer    Diabetes Paternal Grandfather     No Known Problems Daughter     No Known Problems Daughter       Social History     Socioeconomic History    Marital status:       Spouse name: Not on file    Number of children: 3    Years of education: Not on file    Highest education level: Not on file   Occupational History    Occupation:    Social Needs    Financial resource strain: Not on file    Food insecurity:     Worry: Not on file     Inability: Not on file    Transportation needs:     Medical: Not on file     Non-medical: Not on file   Tobacco Use    Smoking status: Never Smoker    Smokeless tobacco: Never Used   Substance and Sexual Activity    Alcohol use: No    Drug use: No    Sexual activity: Yes     Partners: Male     Birth control/protection: None   Lifestyle    Physical activity:     Days per week: Not on file     Minutes per session: Not on file    Stress: Not on file   Relationships    Social connections:     Talks on phone: Not on file     Gets together: Not on file     Attends [employer] service: Not on file     Active member of club or organization: Not on file     Attends meetings of clubs or organizations: Not on file     Relationship status: Not on file   Other Topics Concern    Not on file   Social History Narrative    Not on file     No current outpatient medications on file.     No current facility-administered medications for this visit.      Review of patient's allergies indicates:  No Known Allergies   Past Medical History:   Diagnosis Date    Anxiety     Chest pain     Herpes simplex without mention of complication     History of Amplatzer atrial septal defect closure 2016    2006    History of atrial septal defect 2016    Migraine     Sickle cell trait     Vaso vagal episode     Vasovagal syncope 2016     Past Surgical History:   Procedure Laterality Date    CARDIAC CATHETERIZATION  2016    ASD closure     SECTION  ,,    COLON SURGERY  2014    DELIVERY-CEASAREAN SECTION N/A 2016    [facility]    TUBAL LIGATION  2016       Review of Systems  "  Constitutional: Positive for irritability. Negative for activity change, appetite change, diaphoresis, fatigue, fever, malaise/fatigue and unexpected weight change.   HENT: Negative for congestion, ear pain, hearing loss, postnasal drip, sinus pressure, sinus pain, sneezing and sore throat.    Eyes: Positive for photophobia. Negative for pain.   Respiratory: Negative for cough, chest tightness, shortness of breath and wheezing.    Cardiovascular: Positive for syncope. Negative for chest pain, palpitations and leg swelling.   Gastrointestinal: Negative for abdominal distention, abdominal pain, blood in stool, bowel incontinence, constipation, diarrhea, nausea and vomiting.   Endocrine: Negative for cold intolerance, heat intolerance, polydipsia and polyuria.   Genitourinary: Negative for bladder incontinence, difficulty urinating, dysuria, flank pain, frequency, hematuria, pelvic pain and urgency.   Musculoskeletal: Negative for arthralgias, back pain, joint swelling, myalgias and neck pain.   Skin: Negative for pallor.   Allergic/Immunologic: Negative for environmental allergies and food allergies.   Neurological: Positive for syncope, light-headedness and headaches. Negative for dizziness, vertigo, focal weakness, weakness and numbness.   Hematological: Does not bruise/bleed easily.   Psychiatric/Behavioral: Negative for agitation, confusion, decreased concentration and sleep disturbance. The patient is nervous/anxious.       OBJECTIVE:      Vitals:    04/02/19 1103   BP: 128/84   Pulse: 86   Resp: 14   Temp: 99.1 °F (37.3 °C)   SpO2: 97%   Weight: 85.7 kg (189 lb)   Height: 5' 6" (1.676 m)     Physical Exam   Constitutional: She is oriented to person, place, and time. Vital signs are normal. She appears well-developed and well-nourished. No distress.   obese   HENT:   Head: Normocephalic and atraumatic.   Right Ear: Hearing normal.   Left Ear: Hearing normal.   Nose: Nose normal. No rhinorrhea.   Mouth/Throat: " Mucous membranes are normal.   Eyes: Pupils are equal, round, and reactive to light. Conjunctivae and lids are normal. Right eye exhibits no discharge. Left eye exhibits no discharge. Right conjunctiva is not injected. Left conjunctiva is not injected. Right pupil is round and reactive. Left pupil is round and reactive. Pupils are equal.   Neck: Trachea normal and normal range of motion. Neck supple. No JVD present. No tracheal deviation present. No thyromegaly present.   Cardiovascular: Normal rate, regular rhythm, normal heart sounds and intact distal pulses. Exam reveals no gallop and no friction rub.   No murmur heard.  Pulses:       Radial pulses are 2+ on the right side, and 2+ on the left side.   Pulmonary/Chest: Effort normal and breath sounds normal. No stridor. No respiratory distress. She has no decreased breath sounds. She has no wheezes. She has no rhonchi. She has no rales.   Abdominal: Soft. Bowel sounds are normal. She exhibits no distension. There is no tenderness. There is no rigidity and no guarding.   Musculoskeletal: Normal range of motion. She exhibits no edema.   Lymphadenopathy:     She has no cervical adenopathy.   Neurological: She is alert and oriented to person, place, and time. She has normal strength. She is not disoriented. She displays no atrophy. She displays a negative Romberg sign. Coordination and gait normal. GCS eye subscore is 4. GCS verbal subscore is 5. GCS motor subscore is 6.   Orthostatics relatively unremakable   Skin: Skin is warm and dry. Capillary refill takes less than 2 seconds. No lesion and no rash noted. No cyanosis. No pallor.   Psychiatric: She has a normal mood and affect. Her speech is normal and behavior is normal. Judgment and thought content normal. Cognition and memory are normal. She is attentive.   Nursing note and vitals reviewed.     Assessment:       1. Syncope, unspecified syncope type    2. BMI 30.0-30.9,adult    3. Anxiety        Plan:        Syncope, unspecified syncope type  - Orthostatic blood pressure  - encouraged to get up slowly from lying to sitting to standing; 1st thing upon waking remain upright on side of bed before standing; continue to stay well hydrated & avoid caffeine  - wants to hold off on neuro eval or midodrine at this point, states she has seen Dr. Go in the past for migraines    BMI 30.0-30.9,adult   - The patient is asked to make an attempt to improve diet and exercise patterns to aid in medical management of this problem.    Anxiety   - resolved at present, no longer taking any meds        Follow up if symptoms worsen or fail to improve.      4/8/2019 Amparo Monroe, AMALIA, FNP-C

## 2019-04-02 NOTE — PATIENT INSTRUCTIONS
Causes of Syncope  Syncope (fainting) has many causes. Sometimes it is not serious. In other cases, syncope is a sign of a heart problem. But treatment can help    When syncope is not serious  Your healthcare provider may call your problem vasovagal syncope, reflex syncope, or orthostatic hypotension. These types of syncope are generally not serious. They can be caused by:  · Strong feelings, such as anxiety or fear. A nerve signal may briefly change your heart rate and lower your blood pressure too much.  · Standing for too long. Standing may cause blood to pool in your legs. When this happens, your brain may not receive all the blood it needs.  · Standing up too quickly. Your blood pressure may not adjust fast enough to changes in posture and may drop too low. Certain medicines can also cause this problem. Examples of medicines that can cause a drop in blood pressure include diuretics, blood pressure medicines, and medicines for chest pain. Your pharmacist or healthcare provider can discuss these with you.  · Reaction to normal body functions. When you go to the bathroom, have gastrointestinal discomfort, nausea, or pain, your heart may have a natural reflex to slow down and lower blood pressure. This can result in syncope. This may also follow exercise, eating, laughter, weight lifting, or playing musical instruments like the trumpet or trombone.  When heart trouble causes syncope  A heart problem can decrease the amount of oxygen-rich blood that reaches the brain. Heart trouble can be serious and even life threatening if not treated:  · A slow heart rate. Electrical signals tell the chambers of the heart when to pump. But the signals may be slowed or blocked (heart block) as they travel on the hearts electrical pathways. This can be caused by aging, scarred heart tissue, or damage from heart disease. When the heart rate slows, not enough blood is pumped.  · A fast heart rate. Certain problems can make the  heart race. For instance, after a heart attack, also known as acute myocardial infarction, or AMI, abnormal electrical signals may be created. These signals can make the heart suddenly beat very fast. The heart pumps before the chambers can fill with blood. So less blood reaches the brain and other parts of the body. Illegal drugs, certain medicines, heart disease, or an inherited condition can also cause this.  · A heart valve problem. Blood travels through the chambers of the heart as it is pumped. Heart valves open and close to help move blood in the right direction. But a valve may not open or close fully, if its hardened or scarred. As a result, less blood is pumped through the heart to the brain and body. Most often, syncope occurs when a person's aortic valve is critically narrowed and he or she participates in  a strenuous activity.  · A heart muscle problem. Some people develop a thickened heart muscle that blocks blood flow out of the heart to the body. This is called hypertrophic cardiomyopathy. Being dehydrated and having hypertrophic cardiomyopathy can increase the risk for syncope.  Whatever the cause of syncope, it is important to be evaluated by your healthcare provider. You may need to be seen by a cardiologist, neurologist, or an ear, nose, and throat specialist. Do not drive, operate heavy machinery, or participate in activities in which you would be at risk for falls and injury if you have syncope and have not been evaluated.  Date Last Reviewed: 5/1/2016  © 4755-0051 [employer]. [address] [place] [address], [place] [address]. All rights reserved. This information is not intended as a substitute for professional medical care. Always follow your healthcare professional's instructions.        Treating Syncope: Prevention  If you have been told that your fainting is not caused by a heart problem, you can help prevent fainting. And you can learn to respond to your bodys warning  signs.     For your safety and the safety of others, limit your driving as instructed. Do not operate heavy machinery. Also, be careful not to engage in activities that could cause injury if you lose consciousness. Ask your healthcare provider what activities are safe for you.         If you feel faint, lie down for a while and elevate your feet. Sit down and put your head between your knees if you can't lie down.     If you feel faint  · Know the warning signs of fainting: weakness, nausea, dimmed vision, tunnel vision, sweating, feeling flush or warm, dizziness, lightheadedness, or a fast heartbeat.  · Dont ignore or fight any signs that you may faint.  · Lie down until you feel better and elevate your feet if possible. Your symptoms should go away in about 20 to 30 minutes.  Small changes make a big difference  · If you are in a conference, auditorium, or group setting such as a movie theater or play, sit near the aisle so you can leave if you feel faint.  · Get up slowly after you have been lying down.  · If prescribed, wear special stockings to keep blood from pooling in your legs.  · If directed, add salt to your food to raise your blood pressure. Dont skip meals.  · Dont stand for long. Shift your weight over your legs while standing. Shop when lines are short.  · Drink water often, especially when exercising during hot weather.  · Ask your healthcare provider if it is safe for you to drink alcohol, as this can cause syncope in some people.  The role of medicine  Medicines sometimes can play a role in both causing and preventing syncope.  Your medicines may be changed or reduced. Blood pressure medicine may cause fainting. Certain combinations of medicines may also make you faint. And some nonprescription medicines, herbs, and teas may cause symptoms, too. Tell your healthcare provider about any medicine youre taking and if you started using a new one recently.  Medicines may be prescribed. Taking certain  medicines can help prevent fainting. Your healthcare provider can discuss these with you.  Do not use illicit or recreational drugs. These can worsen your risk factors or interfere with prescribed medicines that your healthcare provider has given you.   Date Last Reviewed: 5/1/2016 © 2000-2017 [employer]. [address] [place] [id number]. All rights reserved. This information is not intended as a substitute for professional medical care. Always follow your healthcare professional's instructions.        What is Syncope?     The heart pumps blood nonstop to the brain and the rest of the body.      Syncope is an abrupt and temporary loss of consciousness associated with a loss of body muscle tone. It is often a result of a sudden decrease in blood flow to the brain or lack of oxygen delivery to the brain. Syncope is also known as fainting or a blackout. It is then followed by complete and usually rapid spontaneous recovery.  Understanding heart rate and blood pressure changes  Your brain and body need a steady supply of oxygen-rich blood. Your heart rate and blood pressure adjust to maintain this steady supply throughout all your activities.  · The heart creates electrical signals that travel through it on pathways. These signals set the heart rate, and tell the heart when to pump blood.  · In response to your bodys needs, your brain may also trigger changes in your heart rate and blood pressure. There are sensors in the body that detect the amount of blood flow going to the brain and other parts of the body. If these sensors detect low blood flow, they signal the body to increase the amount of fluid in the blood vessels and increase the heart rate to provide more circulation.  · The blood leaving the heart with each contraction supplies oxygen and nutrients as it circulates in your brain.  Warning signs  Syncope often happens suddenly. Some warning signs, including dimmed, blackened, or  tunnel vision, lightheadedness, sleepiness, or a rapid heartbeat. Some people may feel nauseous or sweaty as well. However, you may have no warning signs at all. After syncope, you recover quickly, but you may feel tired. Do not drive if you are having warning signs that you may faint.  Is it serious?  Syncope is a common problem with many possible causes. Often these causes are not serious. For instance, syncope can be caused by standing for too long or sitting up too fast. In some cases, you may never faint again. However, syncope in the setting of heart abnormalities can be a warning sign of more serious underlying problems. For this reason, your healthcare provider may order several tests to evaluate heart function and rhythm. If you have had syncope, call your healthcare provider to arrange an evaluation.  In older adults, syncope may be a sign that a heart attack has happened. Do not delay seeking treatment.  Even if the cause of syncope is not serious, there is a risk of injury with falling during loss of consciousness. When possible, finding a cause can improve your safety and reduce risk of injury.  Date Last Reviewed: 5/1/2016  © 1558-8177 [employer]. [address] [place] [address], [place] [address]. All rights reserved. This information is not intended as a substitute for professional medical care. Always follow your healthcare professional's instructions.

## 2019-04-08 PROBLEM — F41.9 ANXIETY: Status: ACTIVE | Noted: 2019-04-08

## 2019-04-24 ENCOUNTER — PATIENT MESSAGE (OUTPATIENT)
Dept: FAMILY MEDICINE | Facility: CLINIC | Age: 29
End: 2019-04-24

## 2019-05-30 VITALS
WEIGHT: 189 LBS | HEART RATE: 86 BPM | HEIGHT: 66 IN | DIASTOLIC BLOOD PRESSURE: 74 MMHG | BODY MASS INDEX: 30.37 KG/M2 | RESPIRATION RATE: 14 BRPM | OXYGEN SATURATION: 97 % | TEMPERATURE: 99 F | SYSTOLIC BLOOD PRESSURE: 110 MMHG

## 2019-07-16 ENCOUNTER — PATIENT MESSAGE (OUTPATIENT)
Dept: FAMILY MEDICINE | Facility: CLINIC | Age: 29
End: 2019-07-16

## 2019-07-22 ENCOUNTER — OFFICE VISIT (OUTPATIENT)
Dept: FAMILY MEDICINE | Facility: CLINIC | Age: 29
End: 2019-07-22
Payer: COMMERCIAL

## 2019-07-22 VITALS
DIASTOLIC BLOOD PRESSURE: 82 MMHG | SYSTOLIC BLOOD PRESSURE: 124 MMHG | HEIGHT: 66 IN | HEART RATE: 75 BPM | BODY MASS INDEX: 30.24 KG/M2 | RESPIRATION RATE: 16 BRPM | OXYGEN SATURATION: 97 % | WEIGHT: 188.19 LBS

## 2019-07-22 DIAGNOSIS — F51.02 ADJUSTMENT INSOMNIA: ICD-10-CM

## 2019-07-22 DIAGNOSIS — F41.9 ANXIETY: Primary | ICD-10-CM

## 2019-07-22 PROCEDURE — 99213 PR OFFICE/OUTPT VISIT, EST, LEVL III, 20-29 MIN: ICD-10-PCS | Mod: ,,, | Performed by: NURSE PRACTITIONER

## 2019-07-22 PROCEDURE — 3008F PR BODY MASS INDEX (BMI) DOCUMENTED: ICD-10-PCS | Mod: ,,, | Performed by: NURSE PRACTITIONER

## 2019-07-22 PROCEDURE — 99213 OFFICE O/P EST LOW 20 MIN: CPT | Mod: ,,, | Performed by: NURSE PRACTITIONER

## 2019-07-22 PROCEDURE — 3008F BODY MASS INDEX DOCD: CPT | Mod: ,,, | Performed by: NURSE PRACTITIONER

## 2019-07-22 RX ORDER — MIRTAZAPINE 15 MG/1
15 TABLET, FILM COATED ORAL NIGHTLY
Qty: 30 TABLET | Refills: 11 | Status: SHIPPED | OUTPATIENT
Start: 2019-07-22 | End: 2021-03-29

## 2019-07-22 RX ORDER — VALACYCLOVIR HYDROCHLORIDE 500 MG/1
500 TABLET, FILM COATED ORAL
COMMUNITY

## 2019-07-22 NOTE — PROGRESS NOTES
SUBJECTIVE:      Patient ID: Arnie Browne is a 28 y.o. female.    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 (during stressful times such as exams, report cards, standardized testing), has noticed this summer she has had several panic attacks & feels they are increasing in frequency but not severity or duration, admits to trouble sleeping at night - states her  (newly ) has to sleep with television on which is not her norm & disturbs her sleep, she often works on the computer in bed, she wakes often with trouble falling back asleep, has put on weight as she isn't exercising/moving as much & has stopped watching her diet & water intake, when asked about stress this summer that could be prompting these attacks she states her family has pressured her to throw a big wedding reception since they didn't have a big wedding, she is trying to organize/plan that by herself & finances have been tight at home      Past Surgical History:   Procedure Laterality Date    CARDIAC CATHETERIZATION  2016    ASD closure     SECTION  ,,    COLON SURGERY  2014    DELIVERY-[facility] SECTION N/A 2016    Performed [facility] at [facility] L[facility]D    TUBAL LIGATION  2016     Family History   Problem Relation Age of Onset    Arthritis Father     Hypertension Father     Stroke Father     Heart disease Father     No Known Problems Mother     Asthma Sister     Asthma Daughter     Cancer Paternal Grandmother         breast and bone cancer    Cancer Paternal Grandfather         Colon cancer    Diabetes Paternal Grandfather     No Known Problems Daughter     No Known Problems Daughter       Social History     Socioeconomic History    Marital status:      Spouse name: Not on file    Number of children: 3    Years of education: Not on file     Highest education level: Not on file   Occupational History    Occupation:    Social Needs    Financial resource strain: Not on file    Food insecurity:     Worry: Not on file     Inability: Not on file    Transportation needs:     Medical: Not on file     Non-medical: Not on file   Tobacco Use    Smoking status: Never Smoker    Smokeless tobacco: Never Used   Substance and Sexual Activity    Alcohol use: No    Drug use: No    Sexual activity: Yes     Partners: Male     Birth control/protection: None   Lifestyle    Physical activity:     Days per week: Not on file     Minutes per session: Not on file    Stress: Not on file   Relationships    Social connections:     Talks on phone: Not on file     Gets together: Not on file     Attends [employer]ars[employer] service: Not on file     Active member of club or organization: Not on file     Attends meetings of clubs or organizations: Not on file     Relationship status: Not on file   Other Topics Concern    Not on file   Social History Narrative    Not on file     Current Outpatient Medications   Medication Sig Dispense Refill    valACYclovir (VALTREX) 500 MG tablet Take 500 mg by mouth as needed.      mirtazapine (REMERON) 15 MG tablet Take 1 tablet (15 mg total) by mouth every evening. Can take 2 tablets (30 mg total) by mouth every evening on the 3rd night if no relief 30 tablet 11     No current facility-administered medications for this visit.      Review of patient's allergies indicates:  No Known Allergies   Past Medical History:   Diagnosis Date    Anxiety     Chest pain     Herpes simplex without mention of complication     History of Amplatzer atrial septal defect closure 2016    2006    History of atrial septal defect 2016    Migraine     Sickle cell trait     Vaso vagal episode     Vasovagal syncope 2016     Past Surgical History:   Procedure Laterality Date    CARDIAC CATHETERIZATION  2016    ASD closure      "SECTION  2008,2016,2013    COLON SURGERY  2014    [facility] N/A 5/11/2016    [facility]    TUBAL LIGATION  2016       Review of Systems   Constitutional: Negative for activity change, appetite change, fatigue and unexpected weight change.   HENT: Negative for congestion, ear pain, hearing loss, postnasal drip, sinus pressure, sinus pain, sneezing and sore throat.    Eyes: Negative for photophobia and pain.   Respiratory: Negative for cough, chest tightness, shortness of breath and wheezing.    Cardiovascular: Negative for chest pain, palpitations and leg swelling.   Gastrointestinal: Negative for abdominal distention, abdominal pain, blood in stool, constipation, diarrhea, nausea and vomiting.   Endocrine: Negative for cold intolerance, heat intolerance, polydipsia and polyuria.   Genitourinary: Negative for difficulty urinating, dysuria, flank pain, frequency, hematuria, pelvic pain and urgency.   Musculoskeletal: Negative for arthralgias, back pain, joint swelling, myalgias and neck pain.   Skin: Negative for pallor.   Allergic/Immunologic: Negative for environmental allergies and food allergies.   Neurological: Negative for dizziness, weakness, light-headedness and numbness.   Hematological: Does not bruise/bleed easily.   Psychiatric/Behavioral: Positive for sleep disturbance. Negative for agitation, confusion and decreased concentration. The patient is nervous/anxious.       OBJECTIVE:      Vitals:    07/22/19 1037   BP: 124/82   Pulse: 75   Resp: 16   SpO2: 97%   Weight: 85.4 kg (188 lb 3.2 oz)   Height: 5' 6" (1.676 m)     Physical Exam   Constitutional: She is oriented to person, place, and time. Vital signs are normal. She appears well-developed and well-nourished. No distress.   obese   HENT:   Head: Normocephalic and atraumatic.   Right Ear: Hearing normal.   Left Ear: Hearing normal.   Nose: Nose normal. No rhinorrhea.   Mouth/Throat: Mucous " membranes are normal.   Eyes: Pupils are equal, round, and reactive to light. Conjunctivae and lids are normal. Right eye exhibits no discharge. Left eye exhibits no discharge. Right conjunctiva is not injected. Left conjunctiva is not injected. Right pupil is round and reactive. Left pupil is round and reactive. Pupils are equal.   Neck: Trachea normal and normal range of motion. Neck supple. No JVD present. No tracheal deviation present. No thyromegaly present.   Cardiovascular: Normal rate, regular rhythm, normal heart sounds and intact distal pulses. Exam reveals no gallop and no friction rub.   No murmur heard.  Pulses:       Radial pulses are 2+ on the right side, and 2+ on the left side.   Pulmonary/Chest: Effort normal and breath sounds normal. No stridor. No respiratory distress. She has no decreased breath sounds. She has no wheezes. She has no rhonchi. She has no rales.   Abdominal: Soft. Bowel sounds are normal. She exhibits no distension. There is no tenderness. There is no rigidity and no guarding.   Musculoskeletal: Normal range of motion. She exhibits no edema.   Lymphadenopathy:     She has no cervical adenopathy.   Neurological: She is alert and oriented to person, place, and time. She has normal strength. She displays no atrophy. She displays a negative Romberg sign. Coordination and gait normal.   Skin: Skin is warm and dry. Capillary refill takes less than 2 seconds. No lesion and no rash noted. No cyanosis. No pallor.   Psychiatric: She has a normal mood and affect. Her speech is normal and behavior is normal. Judgment and thought content normal. Cognition and memory are normal. She is attentive.   Nursing note and vitals reviewed.     Assessment:       1. Adjustment insomnia    2. Anxiety        Plan:       Adjustment insomnia  -     mirtazapine (REMERON) 15 MG tablet; Take 1 tablet (15 mg total) by mouth every evening. Can take 2 tablets (30 mg total) by mouth every evening on the 3rd night  if no relief  Dispense: 30 tablet; Refill: 11    Anxiety  -     mirtazapine (REMERON) 15 MG tablet; Take 1 tablet (15 mg total) by mouth every evening. Can take 2 tablets (30 mg total) by mouth every evening on the 3rd night if no relief  Dispense: 30 tablet; Refill: 11        Follow up if symptoms worsen or fail to improve.      7/25/2019 AMALIA Gonzalez, FNP-C

## 2019-07-25 PROBLEM — F51.02 ADJUSTMENT INSOMNIA: Status: ACTIVE | Noted: 2019-07-25

## 2019-07-31 ENCOUNTER — PATIENT MESSAGE (OUTPATIENT)
Dept: FAMILY MEDICINE | Facility: CLINIC | Age: 29
End: 2019-07-31

## 2019-07-31 DIAGNOSIS — F41.9 ANXIETY: Primary | ICD-10-CM

## 2019-08-01 ENCOUNTER — PATIENT MESSAGE (OUTPATIENT)
Dept: FAMILY MEDICINE | Facility: CLINIC | Age: 29
End: 2019-08-01

## 2019-08-01 RX ORDER — HYDROXYZINE HYDROCHLORIDE 50 MG/1
50 TABLET, FILM COATED ORAL DAILY PRN
Qty: 30 TABLET | Refills: 0 | Status: SHIPPED | OUTPATIENT
Start: 2019-08-01 | End: 2020-02-27 | Stop reason: CLARIF

## 2019-12-13 ENCOUNTER — HOSPITAL ENCOUNTER (EMERGENCY)
Facility: HOSPITAL | Age: 29
Discharge: HOME | End: 2019-12-13
Attending: EMERGENCY MEDICINE
Payer: COMMERCIAL

## 2019-12-13 VITALS
BODY MASS INDEX: 30.32 KG/M2 | OXYGEN SATURATION: 99 % | SYSTOLIC BLOOD PRESSURE: 131 MMHG | HEIGHT: 65 IN | HEART RATE: 68 BPM | TEMPERATURE: 98 F | DIASTOLIC BLOOD PRESSURE: 74 MMHG | RESPIRATION RATE: 18 BRPM | WEIGHT: 182 LBS

## 2019-12-13 DIAGNOSIS — R19.7 DIARRHEA, UNSPECIFIED TYPE: Primary | ICD-10-CM

## 2019-12-13 DIAGNOSIS — K52.9 GASTROENTERITIS: ICD-10-CM

## 2019-12-13 DIAGNOSIS — R10.9 ABDOMINAL PAIN, UNSPECIFIED ABDOMINAL LOCATION: ICD-10-CM

## 2019-12-13 LAB
ALBUMIN SERPL BCP-MCNC: 4.3 G/DL (ref 3.5–5.2)
ALP SERPL-CCNC: 43 U/L (ref 55–135)
ALT SERPL W/O P-5'-P-CCNC: 16 U/L (ref 10–44)
ANION GAP SERPL CALC-SCNC: 7 MMOL/L (ref 8–16)
AST SERPL-CCNC: 17 U/L (ref 10–40)
B-HCG UR QL: NEGATIVE
BASOPHILS # BLD AUTO: 0.05 K/UL (ref 0–0.2)
BASOPHILS NFR BLD: 0.8 % (ref 0–1.9)
BILIRUB SERPL-MCNC: 1 MG/DL (ref 0.1–1)
BILIRUB UR QL STRIP: NEGATIVE
BUN SERPL-MCNC: 10 MG/DL (ref 6–20)
CALCIUM SERPL-MCNC: 9.4 MG/DL (ref 8.7–10.5)
CHLORIDE SERPL-SCNC: 106 MMOL/L (ref 95–110)
CLARITY UR: CLEAR
CO2 SERPL-SCNC: 24 MMOL/L (ref 23–29)
COLOR UR: YELLOW
CREAT SERPL-MCNC: 0.7 MG/DL (ref 0.5–1.4)
CTP QC/QA: YES
DIFFERENTIAL METHOD: ABNORMAL
EOSINOPHIL # BLD AUTO: 0.2 K/UL (ref 0–0.5)
EOSINOPHIL NFR BLD: 3 % (ref 0–8)
ERYTHROCYTE [DISTWIDTH] IN BLOOD BY AUTOMATED COUNT: 17.2 % (ref 11.5–14.5)
EST. GFR  (AFRICAN AMERICAN): >60 ML/MIN/1.73 M^2
EST. GFR  (NON AFRICAN AMERICAN): >60 ML/MIN/1.73 M^2
GLUCOSE SERPL-MCNC: 85 MG/DL (ref 70–110)
GLUCOSE UR QL STRIP: NEGATIVE
HCT VFR BLD AUTO: 38.5 % (ref 37–48.5)
HGB BLD-MCNC: 12.7 G/DL (ref 12–16)
HGB UR QL STRIP: NEGATIVE
IMM GRANULOCYTES # BLD AUTO: 0.01 K/UL (ref 0–0.04)
IMM GRANULOCYTES NFR BLD AUTO: 0.2 % (ref 0–0.5)
KETONES UR QL STRIP: NEGATIVE
LEUKOCYTE ESTERASE UR QL STRIP: NEGATIVE
LIPASE SERPL-CCNC: 32 U/L (ref 4–60)
LYMPHOCYTES # BLD AUTO: 2.5 K/UL (ref 1–4.8)
LYMPHOCYTES NFR BLD: 39.4 % (ref 18–48)
MCH RBC QN AUTO: 26.1 PG (ref 27–31)
MCHC RBC AUTO-ENTMCNC: 33 G/DL (ref 32–36)
MCV RBC AUTO: 79 FL (ref 82–98)
MONOCYTES # BLD AUTO: 0.7 K/UL (ref 0.3–1)
MONOCYTES NFR BLD: 11.4 % (ref 4–15)
NEUTROPHILS # BLD AUTO: 2.9 K/UL (ref 1.8–7.7)
NEUTROPHILS NFR BLD: 45.2 % (ref 38–73)
NITRITE UR QL STRIP: NEGATIVE
NRBC BLD-RTO: 0 /100 WBC
PH UR STRIP: 6 [PH] (ref 5–8)
PLATELET # BLD AUTO: 404 K/UL (ref 150–350)
PMV BLD AUTO: 10 FL (ref 9.2–12.9)
POTASSIUM SERPL-SCNC: 3.8 MMOL/L (ref 3.5–5.1)
PROT SERPL-MCNC: 8 G/DL (ref 6–8.4)
PROT UR QL STRIP: NEGATIVE
RBC # BLD AUTO: 4.87 M/UL (ref 4–5.4)
SODIUM SERPL-SCNC: 137 MMOL/L (ref 136–145)
SP GR UR STRIP: 1.01 (ref 1–1.03)
URN SPEC COLLECT METH UR: NORMAL
UROBILINOGEN UR STRIP-ACNC: NEGATIVE EU/DL
WBC # BLD AUTO: 6.39 K/UL (ref 3.9–12.7)

## 2019-12-13 PROCEDURE — 63600175 PHARM REV CODE 636 W HCPCS: Performed by: EMERGENCY MEDICINE

## 2019-12-13 PROCEDURE — 85025 COMPLETE CBC W/AUTO DIFF WBC: CPT

## 2019-12-13 PROCEDURE — 96374 THER/PROPH/DIAG INJ IV PUSH: CPT

## 2019-12-13 PROCEDURE — 80053 COMPREHEN METABOLIC PANEL: CPT

## 2019-12-13 PROCEDURE — 81003 URINALYSIS AUTO W/O SCOPE: CPT

## 2019-12-13 PROCEDURE — 25000003 PHARM REV CODE 250: Performed by: EMERGENCY MEDICINE

## 2019-12-13 PROCEDURE — 99284 EMERGENCY DEPT VISIT MOD MDM: CPT | Mod: 25

## 2019-12-13 PROCEDURE — 81025 URINE PREGNANCY TEST: CPT | Performed by: PHYSICIAN ASSISTANT

## 2019-12-13 PROCEDURE — S0028 INJECTION, FAMOTIDINE, 20 MG: HCPCS | Performed by: EMERGENCY MEDICINE

## 2019-12-13 PROCEDURE — 83690 ASSAY OF LIPASE: CPT

## 2019-12-13 RX ORDER — ONDANSETRON 4 MG/1
4 TABLET, FILM COATED ORAL EVERY 6 HOURS PRN
Qty: 20 TABLET | Refills: 1 | Status: SHIPPED | OUTPATIENT
Start: 2019-12-13 | End: 2020-07-16

## 2019-12-13 RX ORDER — HYOSCYAMINE SULFATE 0.125 MG
125 TABLET ORAL EVERY 4 HOURS PRN
Qty: 30 TABLET | Refills: 0 | Status: SHIPPED | OUTPATIENT
Start: 2019-12-13 | End: 2020-02-27 | Stop reason: CLARIF

## 2019-12-13 RX ORDER — FAMOTIDINE 10 MG/ML
20 INJECTION INTRAVENOUS
Status: COMPLETED | OUTPATIENT
Start: 2019-12-13 | End: 2019-12-13

## 2019-12-13 RX ORDER — FAMOTIDINE 20 MG/1
20 TABLET, FILM COATED ORAL 2 TIMES DAILY
Qty: 20 TABLET | Refills: 0 | Status: SHIPPED | OUTPATIENT
Start: 2019-12-13 | End: 2020-02-27 | Stop reason: CLARIF

## 2019-12-13 RX ORDER — HYOSCYAMINE SULFATE 0.12 MG/1
0.12 TABLET SUBLINGUAL EVERY 4 HOURS PRN
Status: DISCONTINUED | OUTPATIENT
Start: 2019-12-13 | End: 2019-12-13 | Stop reason: HOSPADM

## 2019-12-13 RX ORDER — ACETAMINOPHEN 500 MG
1000 TABLET ORAL
Status: COMPLETED | OUTPATIENT
Start: 2019-12-13 | End: 2019-12-13

## 2019-12-13 RX ADMIN — HYOSCYAMINE SULFATE 0.12 MG: 0.12 TABLET ORAL; SUBLINGUAL at 01:12

## 2019-12-13 RX ADMIN — ACETAMINOPHEN 1000 MG: 500 TABLET, FILM COATED ORAL at 01:12

## 2019-12-13 RX ADMIN — SODIUM CHLORIDE, SODIUM LACTATE, POTASSIUM CHLORIDE, AND CALCIUM CHLORIDE 1000 ML: .6; .31; .03; .02 INJECTION, SOLUTION INTRAVENOUS at 02:12

## 2019-12-13 RX ADMIN — FAMOTIDINE 20 MG: 10 INJECTION, SOLUTION INTRAVENOUS at 01:12

## 2019-12-13 NOTE — ED PROVIDER NOTES
Encounter Date: 2019       History     Chief Complaint   Patient presents with    Abdominal Pain     29-year-old female presented emergency department with lower abdominal cramps and cramps got better after she had a bowel movement.  Patient had a loose bowel movement.  Patient also had history of irritable bowel syndrome.  Patient states she feels better now.  Denies fever or chills or nausea or vomiting.  Patient states the cramps were in the left lower abdomen which or almost gone at this time.  Patient feels slight discomfort in that area.  Patient does not have any more residual tenderness at this time.        Review of patient's allergies indicates:  No Known Allergies  Past Medical History:   Diagnosis Date    Anxiety     Chest pain     Herpes simplex without mention of complication     History of Amplatzer atrial septal defect closure 2016    2006    History of atrial septal defect 2016    Migraine     Sickle cell trait     Vaso vagal episode     Vasovagal syncope 2016     Past Surgical History:   Procedure Laterality Date    CARDIAC CATHETERIZATION  2016    ASD closure     SECTION  ,,    COLON SURGERY  2014    TUBAL LIGATION  2016     Family History   Problem Relation Age of Onset    Arthritis Father     Hypertension Father     Stroke Father     Heart disease Father     No Known Problems Mother     Asthma Sister     Asthma Daughter     Cancer Paternal Grandmother         breast and bone cancer    Cancer Paternal Grandfather         Colon cancer    Diabetes Paternal Grandfather     No Known Problems Daughter     No Known Problems Daughter      Social History     Tobacco Use    Smoking status: Never Smoker    Smokeless tobacco: Never Used   Substance Use Topics    Alcohol use: No    Drug use: No     Review of Systems   Constitutional: Negative.    HENT: Negative.    Eyes: Negative.    Respiratory: Negative.    Cardiovascular: Negative.   Negative for chest pain.   Gastrointestinal: Positive for abdominal pain and diarrhea.   Endocrine: Negative.    Genitourinary: Negative.    Musculoskeletal: Negative.    Skin: Negative.    Allergic/Immunologic: Negative.    Neurological: Negative.    Hematological: Negative.    Psychiatric/Behavioral: Negative.    All other systems reviewed and are negative.      Physical Exam     Initial Vitals [12/13/19 1047]   BP Pulse Resp Temp SpO2   134/83 78 18 98.7 °F (37.1 °C) 100 %      MAP       --         Physical Exam    Nursing note and vitals reviewed.  Constitutional: She appears well-developed and well-nourished.   HENT:   Head: Normocephalic and atraumatic.   Nose: Nose normal.   Mouth/Throat: Oropharynx is clear and moist.   Eyes: Conjunctivae and EOM are normal. Pupils are equal, round, and reactive to light.   Neck: Normal range of motion. Neck supple. No thyromegaly present. No tracheal deviation present. No JVD present.   Cardiovascular: Normal rate, regular rhythm, normal heart sounds and intact distal pulses.   No murmur heard.  Pulmonary/Chest: Breath sounds normal. No stridor. No respiratory distress. She has no wheezes. She has no rales.   Abdominal: Soft. Bowel sounds are normal. She exhibits no distension. There is no tenderness. There is no rebound and no guarding.   Musculoskeletal: Normal range of motion. She exhibits no edema.   Neurological: She is alert and oriented to person, place, and time. She has normal strength. No cranial nerve deficit or sensory deficit.   Skin: Skin is warm. Capillary refill takes less than 2 seconds.   Psychiatric: She has a normal mood and affect. Thought content normal.         ED Course   Procedures  Labs Reviewed   CBC W/ AUTO DIFFERENTIAL - Abnormal; Notable for the following components:       Result Value    Mean Corpuscular Volume 79 (*)     Mean Corpuscular Hemoglobin 26.1 (*)     RDW 17.2 (*)     Platelets 404 (*)     All other components within normal limits     Narrative:     For upper or mid abdominal pain.   COMPREHENSIVE METABOLIC PANEL - Abnormal; Notable for the following components:    Alkaline Phosphatase 43 (*)     Anion Gap 7 (*)     All other components within normal limits    Narrative:     For upper or mid abdominal pain.   LIPASE    Narrative:     For upper or mid abdominal pain.   URINALYSIS    Narrative:     In and Out Cath as needed it patient unable to void   POCT URINE PREGNANCY          Imaging Results    None          Medical Decision Making:   Differential Diagnosis:   [age]-year-old female with lower abdominal cramps which now resolved.  Patient had symptoms consistent with gastroenteritis.  Patient also has irritable bowel syndrome.  No right lower abdominal tenderness noted. Patient has symptoms of gastroenteritis so symptomatic treatment and discharged with instructions and follow-up  Clinical Tests:   Lab Tests: Ordered and Reviewed                                 Clinical Impression:       ICD-10-CM ICD-9-CM   1. Diarrhea, unspecified type R19.7 787.91   2. Abdominal pain, unspecified abdominal location R10.9 789.00   3. Gastroenteritis K52.9 558.9                             Davin Guerrero MD  12/13/19 1698

## 2019-12-13 NOTE — DISCHARGE INSTRUCTIONS
Drink lots of fluids.  Rest.  Return to emergency department for worsening symptoms or any problems

## 2020-02-28 PROBLEM — N92.0 MENORRHAGIA WITH REGULAR CYCLE: Status: ACTIVE | Noted: 2020-02-28

## 2020-02-28 PROBLEM — N94.6 DYSMENORRHEA: Status: ACTIVE | Noted: 2020-02-28

## 2020-07-16 ENCOUNTER — OFFICE VISIT (OUTPATIENT)
Dept: FAMILY MEDICINE | Facility: CLINIC | Age: 30
End: 2020-07-16
Payer: COMMERCIAL

## 2020-07-16 DIAGNOSIS — Z77.098 EXPOSURE TO IODINE: ICD-10-CM

## 2020-07-16 DIAGNOSIS — F41.8 SITUATIONAL ANXIETY: Primary | ICD-10-CM

## 2020-07-16 DIAGNOSIS — Z78.9 TAKES DIETARY SUPPLEMENTS: ICD-10-CM

## 2020-07-16 PROCEDURE — 99214 PR OFFICE/OUTPT VISIT, EST, LEVL IV, 30-39 MIN: ICD-10-PCS | Mod: 95,,, | Performed by: NURSE PRACTITIONER

## 2020-07-16 PROCEDURE — 99214 OFFICE O/P EST MOD 30 MIN: CPT | Mod: 95,,, | Performed by: NURSE PRACTITIONER

## 2020-07-16 RX ORDER — ALPRAZOLAM 0.25 MG/1
0.25 TABLET ORAL 2 TIMES DAILY PRN
Qty: 60 TABLET | Refills: 2 | Status: SHIPPED | OUTPATIENT
Start: 2020-07-16 | End: 2020-11-11 | Stop reason: SDUPTHER

## 2020-07-16 NOTE — PROGRESS NOTES
Subjective:        The chief complaint leading to consultation is: anxiety  The patient location is:  Home  Visit type: Virtual visit with synchronous audio/video or audio only  This was a video visit in lieu of in-person visit due to the coronavirus emergency. Patient acknowledged and consented to the video visit encounter.     Anxiety  Presents for follow-up visit. Symptoms include excessive worry and nervous/anxious behavior. Patient reports no chest pain, confusion, decreased concentration, dizziness, nausea, palpitations or shortness of breath. Symptoms occur constantly. The severity of symptoms is mild. The quality of sleep is good.           Past Surgical History:   Procedure Laterality Date    CARDIAC CATHETERIZATION  2006    ASD closure     SECTION  ,,    COLONOSCOPY      HYSTEROSCOPY WITH DILATION AND CURETTAGE OF UTERUS N/A 2020    Procedure: HYSTEROSCOPY, WITH DILATION AND CURETTAGE OF UTERUS;  Surgeon: Julissa Munoz MD;  Location: [facility];  Service: OB/GYN;  Laterality: N/A;    THERMAL ABLATION OF ENDOMETRIUM USING HYSTEROSCOPY N/A 2020    Procedure: ABLATION, ENDOMETRIUM, THERMAL, HYSTEROSCOPIC - Novasure;  Surgeon: Julissa Munoz MD;  Location: [facility] OR;  Service: OB/GYN;  Laterality: N/A;    TUBAL LIGATION       Past Medical History:   Diagnosis Date    Anxiety     Chest pain     Herpes simplex without mention of complication     History of Amplatzer atrial septal defect closure 2016    History of atrial septal defect 2016    Migraine     PONV (postoperative nausea and vomiting)     Sickle cell trait     Vaso vagal episode     Vasovagal syncope     last 2019     Family History   Problem Relation Age of Onset    Arthritis Father     Hypertension Father     Stroke Father     Heart disease Father     No Known Problems Mother     Asthma Sister     Asthma Daughter     Cancer Paternal Grandmother         breast and  bone cancer    Cancer Paternal Grandfather         Colon cancer    Diabetes Paternal Grandfather     No Known Problems Daughter     No Known Problems Daughter         Social History:   Marital Status:   Alcohol History:  reports no history of alcohol use.  Tobacco History:  reports that she has never smoked. She has never used smokeless tobacco.  Drug History:  reports no history of drug use.    Review of patient's allergies indicates:  No Known Allergies    Current Outpatient Medications   Medication Sig Dispense Refill    mirtazapine (REMERON) 15 MG tablet Take 1 tablet (15 mg total) by mouth every evening. Can take 2 tablets (30 mg total) by mouth every evening on the 3rd night if no relief (Patient taking differently: Take 15 mg by mouth every evening. Can take 2 tablets (30 mg total) by mouth every evening on the 3rd night if no relief  USE AS USUAL) 30 tablet 11    valACYclovir (VALTREX) 500 MG tablet Take 500 mg by mouth as needed. DO NOT TAKE AM OF SURGERY      ALPRAZolam (XANAX) 0.25 MG tablet Take 1 tablet (0.25 mg total) by mouth 2 (two) times daily as needed for Anxiety. 60 tablet 2     No current facility-administered medications for this visit.        Review of Systems   Constitutional: Positive for activity change (decreased). Negative for appetite change, fatigue and unexpected weight change.        Now a pescatarian, taking supplements: Bladderwreak, Burdock root, Sea flaherty, multivitamin, probiotic   HENT: Negative for congestion, ear pain, hearing loss, postnasal drip, rhinorrhea, sinus pressure, sinus pain, sneezing, sore throat and trouble swallowing.    Eyes: Negative for photophobia, pain, discharge and visual disturbance.   Respiratory: Negative for cough, chest tightness, shortness of breath and wheezing.    Cardiovascular: Negative for chest pain, palpitations and leg swelling.   Gastrointestinal: Negative for abdominal distention, abdominal pain, blood in stool, constipation,  diarrhea, nausea and vomiting.   Endocrine: Negative for cold intolerance, heat intolerance, polydipsia and polyuria.   Genitourinary: Negative for difficulty urinating, dysuria, flank pain, frequency, hematuria, menstrual problem, pelvic pain and urgency.   Musculoskeletal: Negative for arthralgias, back pain, joint swelling, myalgias and neck pain.   Skin: Negative for pallor.   Allergic/Immunologic: Negative for environmental allergies and food allergies.   Neurological: Negative for dizziness, weakness, light-headedness, numbness and headaches.   Hematological: Does not bruise/bleed easily.   Psychiatric/Behavioral: Negative for agitation, confusion, decreased concentration, dysphoric mood and sleep disturbance. The patient is nervous/anxious.          Objective:        Physical Exam:   Physical Exam  Constitutional:       General: She is not in acute distress.     Appearance: Normal appearance. She is well-developed. She is obese.   HENT:      Head: Normocephalic and atraumatic.      Nose: Nose normal.   Eyes:      General: Lids are normal.   Neck:      Musculoskeletal: Normal range of motion.      Trachea: No tracheal deviation.   Pulmonary:      Effort: Pulmonary effort is normal. No respiratory distress.   Skin:     Coloration: Skin is not pale.   Neurological:      Mental Status: She is alert and oriented to person, place, and time.      GCS: GCS eye subscore is 4. GCS verbal subscore is 5. GCS motor subscore is 6.   Psychiatric:         Speech: Speech normal.         Behavior: Behavior normal.         Thought Content: Thought content normal.         Judgment: Judgment normal.              Assessment:       1. Situational anxiety    2. Takes dietary supplements    3. Exposure to iodine      Plan:   Situational anxiety  -     ALPRAZolam (XANAX) 0.25 MG tablet; Take 1 tablet (0.25 mg total) by mouth 2 (two) times daily as needed for Anxiety.  Dispense: 60 tablet; Refill: 2    Takes dietary supplements  -      Iodine, Serum; Future; Expected date: 07/16/2020  -     Comprehensive metabolic panel; Future; Expected date: 07/16/2020  -     TSH; Future; Expected date: 07/16/2020  -     T4, free; Future; Expected date: 07/16/2020    Exposure to iodine  -     Iodine, Serum; Future; Expected date: 07/16/2020  -     Comprehensive metabolic panel; Future; Expected date: 07/16/2020  -     TSH; Future; Expected date: 07/16/2020  -     T4, free; Future; Expected date: 07/16/2020    Research done on supplements listed in ROS, and labs ordered accordingly. Will contact patient with results. Informed her supplements aren't regulated by the FDA, and it is often difficult to predict all interactions and side effects of these products.      Follow up if symptoms worsen or fail to improve, for already scheduled for visit in December.    Total time spent with patient: 36 mins    Each patient to whom he or she provides medical services by telemedicine is:  (1) informed of the relationship between the physician and patient and the respective role of any other health care provider with respect to management of the patient; and (2) notified that he or she may decline to receive medical services by telemedicine and may withdraw from such care at any time.    This note was created using [employer] voice recognition software that occasionally misinterprets phrases or words.

## 2020-07-16 NOTE — PROGRESS NOTES
SUBJECTIVE:      Patient ID: Arnie Browne is a 29 y.o. female.    Chief Complaint: No chief complaint on file.    Anxiety  Presents for follow-up visit. Symptoms include excessive worry and nervous/anxious behavior. Patient reports no chest pain, confusion, decreased concentration, dizziness, nausea, palpitations or shortness of breath. Symptoms occur constantly. The severity of symptoms is mild. The quality of sleep is good.     Compliance with medications is %.       Past Surgical History:   Procedure Laterality Date    CARDIAC CATHETERIZATION      ASD closure     SECTION  ,,    COLONOSCOPY  2014    HYSTEROSCOPY WITH DILATION AND CURETTAGE OF UTERUS N/A 2020    Procedure: HYSTEROSCOPY, WITH DILATION AND CURETTAGE OF UTERUS;  Surgeon: Julissa Munoz MD;  Location: [facility] OR;  Service: OB/GYN;  Laterality: N/A;    THERMAL ABLATION OF ENDOMETRIUM USING HYSTEROSCOPY N/A 2020    Procedure: ABLATION, ENDOMETRIUM, THERMAL, HYSTEROSCOPIC - [employer]asure;  Surgeon: Julissa Munoz MD;  Location: [facility] OR;  Service: OB/GYN;  Laterality: N/A;    TUBAL LIGATION       Family History   Problem Relation Age of Onset    Arthritis Father     Hypertension Father     Stroke Father     Heart disease Father     No Known Problems Mother     Asthma Sister     Asthma Daughter     Cancer Paternal Grandmother         breast and bone cancer    Cancer Paternal Grandfather         Colon cancer    Diabetes Paternal Grandfather     No Known Problems Daughter     No Known Problems Daughter       Social History     Socioeconomic History    Marital status:      Spouse name: Not on file    Number of children: 3    Years of education: Not on file    Highest education level: Not on file   Occupational History    Occupation:    Social Needs    Financial resource strain: Not on file    Food insecurity     Worry: Not on file     Inability: Not on file     Transportation needs     Medical: Not on file     Non-medical: Not on file   Tobacco Use    Smoking status: Never Smoker    Smokeless tobacco: Never Used   Substance and Sexual Activity    Alcohol use: No    Drug use: No    Sexual activity: Yes     Partners: Male     Birth control/protection: None   Lifestyle    Physical activity     Days per week: Not on file     Minutes per session: Not on file    Stress: Not on file   Relationships    Social connections     Talks on phone: Not on file     Gets together: Not on file     Attends Jehovah's witness service: Not on file     Active member of club or organization: Not on file     Attends meetings of clubs or organizations: Not on file     Relationship status: Not on file   Other Topics Concern    Not on file   Social History Narrative    Not on file     Current Outpatient Medications   Medication Sig Dispense Refill    mirtazapine (REMERON) 15 MG tablet Take 1 tablet (15 mg total) by mouth every evening. Can take 2 tablets (30 mg total) by mouth every evening on the 3rd night if no relief (Patient taking differently: Take 15 mg by mouth every evening. Can take 2 tablets (30 mg total) by mouth every evening on the 3rd night if no relief  USE AS USUAL) 30 tablet 11    valACYclovir (VALTREX) 500 MG tablet Take 500 mg by mouth as needed. DO NOT TAKE AM OF SURGERY      ALPRAZolam (XANAX) 0.25 MG tablet Take 1 tablet (0.25 mg total) by mouth 2 (two) times daily as needed for Anxiety. 60 tablet 2     No current facility-administered medications for this visit.      Review of patient's allergies indicates:  No Known Allergies   Past Medical History:   Diagnosis Date    Anxiety     Chest pain     Herpes simplex without mention of complication     History of Amplatzer atrial septal defect closure 1/13/2016    2006    History of atrial septal defect 1/13/2016    Migraine     PONV (postoperative nausea and vomiting)     Sickle cell trait     Vaso vagal episode      Vasovagal syncope     last      Past Surgical History:   Procedure Laterality Date    CARDIAC CATHETERIZATION  2006    ASD closure     SECTION  ,2016,2013    COLONOSCOPY  2014    HYSTEROSCOPY WITH DILATION AND CURETTAGE OF UTERUS N/A 2020    Procedure: HYSTEROSCOPY, WITH DILATION AND CURETTAGE OF UTERUS;  Surgeon: Julissa Munoz MD;  Location: [facility] OR;  Service: OB/GYN;  Laterality: N/A;    THERMAL ABLATION OF ENDOMETRIUM USING HYSTEROSCOPY N/A 2020    Procedure: ABLATION, ENDOMETRIUM, THERMAL, HYSTEROSCOPIC - Novasure;  Surgeon: Julissa Munoz MD;  Location: [facility] OR;  Service: OB/GYN;  Laterality: N/A;    TUBAL LIGATION         Review of Systems   Constitutional: Positive for activity change (decreased). Negative for appetite change, fatigue and unexpected weight change.        Now a pescatarian, taking supplements: Bladderwreck, Burdock root, Sea flaherty, multivitamin, probiotic     HENT: Negative for congestion, ear pain, hearing loss, postnasal drip, rhinorrhea, sinus pressure, sinus pain, sneezing, sore throat and trouble swallowing.    Eyes: Negative for photophobia, pain, discharge and visual disturbance.   Respiratory: Negative for cough, chest tightness, shortness of breath and wheezing.    Cardiovascular: Negative for chest pain, palpitations and leg swelling.   Gastrointestinal: Negative for abdominal distention, abdominal pain, blood in stool, constipation, diarrhea, nausea and vomiting.   Endocrine: Negative for cold intolerance, heat intolerance, polydipsia and polyuria.   Genitourinary: Negative for difficulty urinating, dysuria, flank pain, frequency, hematuria, menstrual problem, pelvic pain and urgency.   Musculoskeletal: Negative for arthralgias, back pain, joint swelling, myalgias and neck pain.   Skin: Negative for pallor.   Allergic/Immunologic: Negative for environmental allergies and food allergies.   Neurological: Negative for dizziness, weakness,  light-headedness, numbness and headaches.   Hematological: Does not bruise/bleed easily.   Psychiatric/Behavioral: Negative for agitation, confusion, decreased concentration, dysphoric mood and sleep disturbance. The patient is nervous/anxious.       OBJECTIVE:      There were no vitals filed for this visit.  Physical Exam  Constitutional:       General: She is not in acute distress.     Appearance: She is well-developed. She is obese.   HENT:      Head: Normocephalic and atraumatic.      Nose: Nose normal.   Eyes:      General: Lids are normal.   Neck:      Musculoskeletal: Normal range of motion.      Trachea: No tracheal deviation.   Pulmonary:      Effort: Pulmonary effort is normal. No respiratory distress.   Skin:     Coloration: Skin is not pale.   Neurological:      Mental Status: She is alert and oriented to person, place, and time.      GCS: GCS eye subscore is 4. GCS verbal subscore is 5. GCS motor subscore is 6.   Psychiatric:         Speech: Speech normal.         Behavior: Behavior normal.         Thought Content: Thought content normal.         Judgment: Judgment normal.        Assessment:       1. Situational anxiety        Plan:       Situational anxiety  -     ALPRAZolam (XANAX) 0.25 MG tablet; Take 1 tablet (0.25 mg total) by mouth 2 (two) times daily as needed for Anxiety.  Dispense: 60 tablet; Refill: 2        Follow up if symptoms worsen or fail to improve, for already scheduled for visit in December.      7/16/2020 AMALIA Gonzalez, FNP-C

## 2020-09-18 ENCOUNTER — PATIENT MESSAGE (OUTPATIENT)
Dept: FAMILY MEDICINE | Facility: CLINIC | Age: 30
End: 2020-09-18

## 2020-09-18 DIAGNOSIS — R49.1 LOSS OF VOICE: Primary | ICD-10-CM

## 2020-09-23 ENCOUNTER — PATIENT MESSAGE (OUTPATIENT)
Dept: FAMILY MEDICINE | Facility: CLINIC | Age: 30
End: 2020-09-23

## 2020-11-11 ENCOUNTER — PATIENT MESSAGE (OUTPATIENT)
Dept: FAMILY MEDICINE | Facility: CLINIC | Age: 30
End: 2020-11-11

## 2020-11-11 DIAGNOSIS — F41.8 SITUATIONAL ANXIETY: ICD-10-CM

## 2020-11-11 DIAGNOSIS — R82.998 SWEET URINE ODOR: Primary | ICD-10-CM

## 2020-11-11 DIAGNOSIS — F41.9 ANXIETY: Primary | ICD-10-CM

## 2020-11-11 RX ORDER — ALPRAZOLAM 0.5 MG/1
0.5 TABLET ORAL 2 TIMES DAILY PRN
Qty: 60 TABLET | Refills: 2 | Status: SHIPPED | OUTPATIENT
Start: 2020-11-11 | End: 2021-03-29 | Stop reason: SDUPTHER

## 2020-11-13 ENCOUNTER — LAB VISIT (OUTPATIENT)
Dept: LAB | Facility: HOSPITAL | Age: 30
End: 2020-11-13
Attending: NURSE PRACTITIONER
Payer: COMMERCIAL

## 2020-11-13 DIAGNOSIS — R82.998 SWEET URINE ODOR: ICD-10-CM

## 2020-11-13 DIAGNOSIS — Z77.098 EXPOSURE TO IODINE: ICD-10-CM

## 2020-11-13 DIAGNOSIS — Z78.9 TAKES DIETARY SUPPLEMENTS: ICD-10-CM

## 2020-11-13 LAB
ALBUMIN SERPL BCP-MCNC: 4.2 G/DL (ref 3.5–5.2)
ALP SERPL-CCNC: 44 U/L (ref 55–135)
ALT SERPL W/O P-5'-P-CCNC: 16 U/L (ref 10–44)
ANION GAP SERPL CALC-SCNC: 9 MMOL/L (ref 8–16)
AST SERPL-CCNC: 16 U/L (ref 10–40)
BILIRUB SERPL-MCNC: 0.9 MG/DL (ref 0.1–1)
BILIRUB UR QL STRIP: NEGATIVE
BUN SERPL-MCNC: 8 MG/DL (ref 6–20)
CALCIUM SERPL-MCNC: 9.2 MG/DL (ref 8.7–10.5)
CHLORIDE SERPL-SCNC: 105 MMOL/L (ref 95–110)
CLARITY UR: CLEAR
CO2 SERPL-SCNC: 24 MMOL/L (ref 23–29)
COLOR UR: YELLOW
CREAT SERPL-MCNC: 0.7 MG/DL (ref 0.5–1.4)
EST. GFR  (AFRICAN AMERICAN): >60 ML/MIN/1.73 M^2
EST. GFR  (NON AFRICAN AMERICAN): >60 ML/MIN/1.73 M^2
GLUCOSE SERPL-MCNC: 82 MG/DL (ref 70–110)
GLUCOSE UR QL STRIP: NEGATIVE
HGB UR QL STRIP: NEGATIVE
KETONES UR QL STRIP: NEGATIVE
LEUKOCYTE ESTERASE UR QL STRIP: NEGATIVE
NITRITE UR QL STRIP: NEGATIVE
PH UR STRIP: 6 [PH] (ref 5–8)
POTASSIUM SERPL-SCNC: 3.7 MMOL/L (ref 3.5–5.1)
PROT SERPL-MCNC: 7.8 G/DL (ref 6–8.4)
PROT UR QL STRIP: NEGATIVE
SODIUM SERPL-SCNC: 138 MMOL/L (ref 136–145)
SP GR UR STRIP: 1.01 (ref 1–1.03)
T4 FREE SERPL-MCNC: 0.72 NG/DL (ref 0.71–1.51)
TSH SERPL DL<=0.005 MIU/L-ACNC: 1.7 UIU/ML (ref 0.34–5.6)
URN SPEC COLLECT METH UR: NORMAL
UROBILINOGEN UR STRIP-ACNC: NEGATIVE EU/DL

## 2020-11-13 PROCEDURE — 81003 URINALYSIS AUTO W/O SCOPE: CPT

## 2020-11-13 PROCEDURE — 36415 COLL VENOUS BLD VENIPUNCTURE: CPT

## 2020-11-13 PROCEDURE — 84443 ASSAY THYROID STIM HORMONE: CPT

## 2020-11-13 PROCEDURE — 84439 ASSAY OF FREE THYROXINE: CPT

## 2020-11-13 PROCEDURE — 80053 COMPREHEN METABOLIC PANEL: CPT

## 2020-11-16 ENCOUNTER — PATIENT MESSAGE (OUTPATIENT)
Dept: FAMILY MEDICINE | Facility: CLINIC | Age: 30
End: 2020-11-16

## 2020-11-24 ENCOUNTER — PATIENT MESSAGE (OUTPATIENT)
Dept: FAMILY MEDICINE | Facility: CLINIC | Age: 30
End: 2020-11-24

## 2020-11-24 ENCOUNTER — HOSPITAL ENCOUNTER (EMERGENCY)
Facility: HOSPITAL | Age: 30
Discharge: HOME | End: 2020-11-25
Attending: EMERGENCY MEDICINE
Payer: COMMERCIAL

## 2020-11-24 ENCOUNTER — TELEPHONE (OUTPATIENT)
Dept: FAMILY MEDICINE | Facility: CLINIC | Age: 30
End: 2020-11-24

## 2020-11-24 DIAGNOSIS — R50.9 FEVER: ICD-10-CM

## 2020-11-24 DIAGNOSIS — U07.1 COVID-19 VIRUS DETECTED: ICD-10-CM

## 2020-11-24 DIAGNOSIS — Z20.822 SUSPECTED COVID-19 VIRUS INFECTION: ICD-10-CM

## 2020-11-24 LAB
ALBUMIN SERPL BCP-MCNC: 4 G/DL (ref 3.5–5.2)
ALP SERPL-CCNC: 43 U/L (ref 55–135)
ALT SERPL W/O P-5'-P-CCNC: 16 U/L (ref 10–44)
ANION GAP SERPL CALC-SCNC: 10 MMOL/L (ref 8–16)
AST SERPL-CCNC: 16 U/L (ref 10–40)
B-HCG UR QL: NEGATIVE
BASOPHILS # BLD AUTO: 0.03 K/UL (ref 0–0.2)
BASOPHILS NFR BLD: 0.6 % (ref 0–1.9)
BILIRUB SERPL-MCNC: 0.7 MG/DL (ref 0.1–1)
BUN SERPL-MCNC: 6 MG/DL (ref 6–20)
CALCIUM SERPL-MCNC: 9.1 MG/DL (ref 8.7–10.5)
CHLORIDE SERPL-SCNC: 108 MMOL/L (ref 95–110)
CK SERPL-CCNC: 50 U/L (ref 20–180)
CO2 SERPL-SCNC: 21 MMOL/L (ref 23–29)
CREAT SERPL-MCNC: 1 MG/DL (ref 0.5–1.4)
CRP SERPL-MCNC: 0.6 MG/DL
CTP QC/QA: YES
D DIMER PPP IA.FEU-MCNC: 0.95 UG/ML FEU
DIFFERENTIAL METHOD: ABNORMAL
EOSINOPHIL # BLD AUTO: 0.1 K/UL (ref 0–0.5)
EOSINOPHIL NFR BLD: 1.3 % (ref 0–8)
ERYTHROCYTE [DISTWIDTH] IN BLOOD BY AUTOMATED COUNT: 15.5 % (ref 11.5–14.5)
EST. GFR  (AFRICAN AMERICAN): >60 ML/MIN/1.73 M^2
EST. GFR  (NON AFRICAN AMERICAN): >60 ML/MIN/1.73 M^2
FERRITIN SERPL-MCNC: 14 NG/ML (ref 20–300)
GLUCOSE SERPL-MCNC: 86 MG/DL (ref 70–110)
HCT VFR BLD AUTO: 38.9 % (ref 37–48.5)
HGB BLD-MCNC: 13.1 G/DL (ref 12–16)
IMM GRANULOCYTES # BLD AUTO: 0.01 K/UL (ref 0–0.04)
IMM GRANULOCYTES NFR BLD AUTO: 0.2 % (ref 0–0.5)
LACTATE SERPL-SCNC: 0.9 MMOL/L (ref 0.5–1.9)
LDH SERPL L TO P-CCNC: 170 U/L (ref 110–260)
LYMPHOCYTES # BLD AUTO: 1 K/UL (ref 1–4.8)
LYMPHOCYTES NFR BLD: 21.6 % (ref 18–48)
MCH RBC QN AUTO: 28.4 PG (ref 27–31)
MCHC RBC AUTO-ENTMCNC: 33.7 G/DL (ref 32–36)
MCV RBC AUTO: 84 FL (ref 82–98)
MONOCYTES # BLD AUTO: 1.5 K/UL (ref 0.3–1)
MONOCYTES NFR BLD: 31.5 % (ref 4–15)
NEUTROPHILS # BLD AUTO: 2.1 K/UL (ref 1.8–7.7)
NEUTROPHILS NFR BLD: 44.8 % (ref 38–73)
NRBC BLD-RTO: 0 /100 WBC
PLATELET # BLD AUTO: 313 K/UL (ref 150–350)
PMV BLD AUTO: 9.4 FL (ref 9.2–12.9)
POTASSIUM SERPL-SCNC: 3.3 MMOL/L (ref 3.5–5.1)
PROCALCITONIN SERPL IA-MCNC: <0.05 NG/ML (ref 0–0.5)
PROT SERPL-MCNC: 7.1 G/DL (ref 6–8.4)
RBC # BLD AUTO: 4.61 M/UL (ref 4–5.4)
SARS-COV-2 RDRP RESP QL NAA+PROBE: POSITIVE
SODIUM SERPL-SCNC: 139 MMOL/L (ref 136–145)
TROPONIN I SERPL DL<=0.01 NG/ML-MCNC: <0.03 NG/ML
WBC # BLD AUTO: 4.64 K/UL (ref 3.9–12.7)

## 2020-11-24 PROCEDURE — 93010 EKG 12-LEAD: ICD-10-PCS | Mod: ,,, | Performed by: INTERNAL MEDICINE

## 2020-11-24 PROCEDURE — 84145 PROCALCITONIN (PCT): CPT

## 2020-11-24 PROCEDURE — 83615 LACTATE (LD) (LDH) ENZYME: CPT

## 2020-11-24 PROCEDURE — 85379 FIBRIN DEGRADATION QUANT: CPT

## 2020-11-24 PROCEDURE — 25000003 PHARM REV CODE 250: Performed by: EMERGENCY MEDICINE

## 2020-11-24 PROCEDURE — 36415 COLL VENOUS BLD VENIPUNCTURE: CPT

## 2020-11-24 PROCEDURE — 86140 C-REACTIVE PROTEIN: CPT

## 2020-11-24 PROCEDURE — 81025 URINE PREGNANCY TEST: CPT | Performed by: EMERGENCY MEDICINE

## 2020-11-24 PROCEDURE — 25500020 PHARM REV CODE 255: Performed by: EMERGENCY MEDICINE

## 2020-11-24 PROCEDURE — 93005 ELECTROCARDIOGRAM TRACING: CPT | Performed by: INTERNAL MEDICINE

## 2020-11-24 PROCEDURE — 83605 ASSAY OF LACTIC ACID: CPT

## 2020-11-24 PROCEDURE — U0002 COVID-19 LAB TEST NON-CDC: HCPCS

## 2020-11-24 PROCEDURE — 87040 BLOOD CULTURE FOR BACTERIA: CPT

## 2020-11-24 PROCEDURE — 99285 EMERGENCY DEPT VISIT HI MDM: CPT | Mod: 25

## 2020-11-24 PROCEDURE — 80053 COMPREHEN METABOLIC PANEL: CPT

## 2020-11-24 PROCEDURE — 82550 ASSAY OF CK (CPK): CPT

## 2020-11-24 PROCEDURE — 82728 ASSAY OF FERRITIN: CPT

## 2020-11-24 PROCEDURE — 85025 COMPLETE CBC W/AUTO DIFF WBC: CPT

## 2020-11-24 PROCEDURE — 84484 ASSAY OF TROPONIN QUANT: CPT

## 2020-11-24 PROCEDURE — 93010 ELECTROCARDIOGRAM REPORT: CPT | Mod: ,,, | Performed by: INTERNAL MEDICINE

## 2020-11-24 RX ORDER — OMEPRAZOLE 40 MG/1
40 CAPSULE, DELAYED RELEASE ORAL DAILY
COMMUNITY
End: 2022-07-07

## 2020-11-24 RX ORDER — ACETAMINOPHEN 500 MG
1000 TABLET ORAL
Status: COMPLETED | OUTPATIENT
Start: 2020-11-24 | End: 2020-11-24

## 2020-11-24 RX ADMIN — IOHEXOL 70 ML: 350 INJECTION, SOLUTION INTRAVENOUS at 10:11

## 2020-11-24 RX ADMIN — ACETAMINOPHEN 1000 MG: 500 TABLET, FILM COATED ORAL at 08:11

## 2020-11-24 NOTE — TELEPHONE ENCOUNTER
Ms Browne called, she went to urgent care today with fever severe body aches , headache, short of breath , her covid rapid test and flu were negative    She reports her fever went up to 102.7 , she is hurting all over aching so bad that it is difficult to breathe , she is taking motrin    Told her if she is sob and fever not going down, she should go to the ER

## 2020-11-25 VITALS
HEIGHT: 65 IN | RESPIRATION RATE: 18 BRPM | BODY MASS INDEX: 29.99 KG/M2 | SYSTOLIC BLOOD PRESSURE: 124 MMHG | WEIGHT: 180 LBS | HEART RATE: 69 BPM | DIASTOLIC BLOOD PRESSURE: 78 MMHG | OXYGEN SATURATION: 100 % | TEMPERATURE: 98 F

## 2020-11-25 NOTE — ED PROVIDER NOTES
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        Review of patient's allergies indicates:  No Known Allergies  Past Medical History:   Diagnosis Date    Anxiety     Chest pain     Herpes simplex without mention of complication     History of Amplatzer atrial septal defect closure 2016    History of atrial septal defect 2016    Migraine     PONV (postoperative nausea and vomiting)     Sickle cell trait     Vaso vagal episode     Vasovagal syncope     last      Past Surgical History:   Procedure Laterality Date    CARDIAC CATHETERIZATION  2006    ASD closure     SECTION  ,,    COLONOSCOPY  2014    HYSTEROSCOPY WITH DILATION AND CURETTAGE OF UTERUS N/A 2020    Procedure: HYSTEROSCOPY, WITH DILATION AND CURETTAGE OF UTERUS;  Surgeon: Julissa Munoz MD;  Location: [facility] OR;  Service: OB/GYN;  Laterality: N/A;    THERMAL ABLATION OF ENDOMETRIUM USING HYSTEROSCOPY N/A 2020    Procedure: ABLATION, ENDOMETRIUM, THERMAL, HYSTEROSCOPIC - [employer]asure;  Surgeon: Julissa Munoz MD;  Location: [facility] OR;  Service: OB/GYN;  Laterality: N/A;    TUBAL LIGATION       Family History   Problem Relation Age of Onset    Arthritis Father     Hypertension Father     Stroke Father     Heart disease Father     No Known Problems Mother     Asthma Sister     Asthma Daughter      Cancer Paternal Grandmother         breast and bone cancer    Cancer Paternal Grandfather         Colon cancer    Diabetes Paternal Grandfather     No Known Problems Daughter     No Known Problems Daughter      Social History     Tobacco Use    Smoking status: Never Smoker    Smokeless tobacco: Never Used   Substance Use Topics    Alcohol use: No    Drug use: No     Review of Systems   Constitutional: Positive for chills and fever.   HENT: Positive for congestion, sinus pressure, sinus pain and sore throat.    Respiratory: Positive for cough and shortness of breath.    Cardiovascular: Positive for chest pain.   Gastrointestinal: Negative.    Genitourinary: Negative.    Musculoskeletal: Negative.    Skin: Negative.    Neurological: Negative.    Hematological: Negative.    Psychiatric/Behavioral: Negative.    All other systems reviewed and are negative.      Physical Exam     Initial Vitals [11/24/20 1837]   BP Pulse Resp Temp SpO2   119/71 102 20 99.8 °F (37.7 °C) 96 %      MAP       --         Physical Exam    Nursing note and vitals reviewed.  Constitutional: She appears well-developed and well-nourished.   HENT:   Head: Normocephalic and atraumatic.   Right Ear: External ear normal.   Left Ear: External ear normal.   Mouth/Throat: Uvula is midline. Posterior oropharyngeal erythema present. No tonsillar abscesses.   Eyes: EOM are normal. Pupils are equal, round, and reactive to light.   Neck: Normal range of motion. Neck supple.   Cardiovascular: Normal rate, regular rhythm, normal heart sounds and intact distal pulses.   Pulmonary/Chest: Breath sounds normal.   Abdominal: Soft. Bowel sounds are normal. There is no abdominal tenderness.   Neurological: She is alert and oriented to person, place, and time. She has normal strength. GCS score is 15. GCS eye subscore is 4. GCS verbal subscore is 5. GCS motor subscore is 6.   Skin: Skin is warm and dry.   Psychiatric: She has a normal mood and affect.          ED Course   Procedures  Labs Reviewed   SARS-COV-2 RNA AMPLIFICATION, QUAL - Abnormal; Notable for the following components:       Result Value    SARS-CoV-2 RNA, Amplification, Qual Positive (*)     All other components within normal limits   CBC W/ AUTO DIFFERENTIAL - Abnormal; Notable for the following components:    RDW 15.5 (*)     Mono # 1.5 (*)     Mono % 31.5 (*)     All other components within normal limits   COMPREHENSIVE METABOLIC PANEL - Abnormal; Notable for the following components:    Potassium 3.3 (*)     CO2 21 (*)     Alkaline Phosphatase 43 (*)     All other components within normal limits   FERRITIN - Abnormal; Notable for the following components:    Ferritin 14 (*)     All other components within normal limits   D DIMER, QUANTITATIVE - Abnormal; Notable for the following components:    D-Dimer 0.95 (*)     All other components within normal limits    Narrative:      DDimer critical result(s) repeated. Called and verbal readback   obtained from Rudi Hill Rn/ER by ADRIENNE 11/24/2020 22:02   CULTURE, BLOOD   CULTURE, BLOOD   C-REACTIVE PROTEIN   LACTATE DEHYDROGENASE   CK   LACTIC ACID, PLASMA   TROPONIN I   PROCALCITONIN   POCT URINE PREGNANCY     EKG Readings: (Independently Interpreted)   The patient has an EKG revealing a sinus rhythm at 70 TN interval normal no ST elevation       Imaging Results          CTA Chest Non-Coronary - PE Study (Final result)  Result time 11/24/20 22:16:00    Final result by Heron Delong MD (11/24/20 22:16:00)                 Narrative:    EXAM DESCRIPTION: CTA CHEST NON CORONARY 11/24/2020 10:58 PM CST    CLINICAL HISTORY: 30 years, Female, PE suspected, low pretest prob    COMPARISON: None    PROCEDURE:  Multiple transaxial tomograms of the chest were obtained from the lung apices through the lung bases utilizing 1 mm slice thickness at 1 mm interval reconstruction after the administration of 74 cc of Omnipaque 350 at a rate of 4.0 cc per second for  complete opacification of the pulmonary arteries.  Subsequent maximum intensity projection images were generated in the coronal and sagittal plane for review.  An individualized dose optimization technique, Automated Exposure Control, was utilized for the performed procedure.    FINDINGS:  The lungs parenchyma demonstrate demonstrate to be clear. No masses nodules and or consolidations are identified.  The trachea mainstem bronchus demonstrate to be normal. There is no significant pericardial or pleural effusions.  Aorta and great vessels demonstrate to be unremarkable with no evidence for thoracic aortic dissection. The heart is normal in size. No evidence for right ventricular strain. There is a metallic artifact within the intra-atrial area/foramen ovale most likely corresponding to atrial septal repair (Amplatzer).  Minimal residual thymic tissue is identified on axial image 84-93. There is no significant mediastinal and/or hilar lymphadenopathy. Pulmonary arteries demonstrate to be normal, no intraluminal defect are seen that would suggest pulmonary embolus.  The axillary regions demonstrate to be clear. The bone windows demonstrate no significant skeletal lesions.    The visualized portions of the upper abdomen demonstrate to be unremarkable.    IMPRESSION:    NO EVIDENCE FOR PULMONARY EMBOLUS AND/OR THORACIC AORTIC THORACIC DISSECTION.  SEPTAL ATRIAL REPAIR.    Electronically signed by:  Heron Delong MD  11/24/2020 11:03 PM [facility] Workstation: [id number]                             X-Ray Chest AP Portable (Final result)  Result time 11/24/20 20:26:54   Procedure changed from X-Ray Chest PA And Lateral     Final result by Laila Hendricks MD (11/24/20 20:26:54)                 Narrative:    PROCEDURE:   XR CHEST AP PORTABLE  dated  11/24/2020 8:31 PM    CLINICAL HISTORY:   Female 30 years of age.   COVID    TECHNIQUE: AP view of the chest obtained portably at 8:31 PM.    PREVIOUS STUDIES:  None  Available    FINDINGS:    Cardiac and mediastinal contours are normal. Lungs are clear. There is  no pleural effusion or pneumothorax. Bones are unremarkable.      IMPRESSION:    No acute findings. Clear lungs. Normal size heart.    Electronically Signed by Laila Hendricks on 11/24/2020 8:42 PM                               Medical Decision Making:   ED Management:  PATIENT WAS DIAGNOSED WITH COVID.  CT negative after the D-dimer elevated.  Will discharge home                             Clinical Impression:       ICD-10-CM ICD-9-CM   1. Fever  R50.9 780.60   2. Suspected COVID-19 virus infection  Z20.828 V01.79                          ED Disposition Condition    Discharge Stable        ED Prescriptions     None        Follow-up Information     Follow up With Specialties Details Why Contact Info    AMALIA Tyler,FNP-C Family Medicine Schedule an appointment as soon as possible for a visit in 1 week  [address] [facility] [address]  [phone]                                         Karla Mcconnell MD  11/25/20 0409

## 2020-11-29 LAB — BACTERIA BLD CULT: NORMAL

## 2020-11-30 ENCOUNTER — PATIENT MESSAGE (OUTPATIENT)
Dept: FAMILY MEDICINE | Facility: CLINIC | Age: 30
End: 2020-11-30

## 2020-12-28 ENCOUNTER — OFFICE VISIT (OUTPATIENT)
Dept: FAMILY MEDICINE | Facility: CLINIC | Age: 30
End: 2020-12-28
Payer: COMMERCIAL

## 2020-12-28 ENCOUNTER — LAB VISIT (OUTPATIENT)
Dept: LAB | Facility: HOSPITAL | Age: 30
End: 2020-12-28
Attending: NURSE PRACTITIONER
Payer: COMMERCIAL

## 2020-12-28 VITALS
WEIGHT: 189.81 LBS | HEART RATE: 65 BPM | TEMPERATURE: 98 F | HEIGHT: 65 IN | BODY MASS INDEX: 31.63 KG/M2 | OXYGEN SATURATION: 98 % | RESPIRATION RATE: 16 BRPM

## 2020-12-28 DIAGNOSIS — Z13.6 ENCOUNTER FOR LIPID SCREENING FOR CARDIOVASCULAR DISEASE: ICD-10-CM

## 2020-12-28 DIAGNOSIS — Z13.1 SCREENING FOR DIABETES MELLITUS: ICD-10-CM

## 2020-12-28 DIAGNOSIS — Z11.59 ENCOUNTER FOR HEPATITIS C SCREENING TEST FOR LOW RISK PATIENT: ICD-10-CM

## 2020-12-28 DIAGNOSIS — Z00.00 ROUTINE HEALTH MAINTENANCE: Primary | ICD-10-CM

## 2020-12-28 DIAGNOSIS — Z13.220 ENCOUNTER FOR LIPID SCREENING FOR CARDIOVASCULAR DISEASE: ICD-10-CM

## 2020-12-28 DIAGNOSIS — Z23 NEED FOR INFLUENZA VACCINATION: ICD-10-CM

## 2020-12-28 DIAGNOSIS — Z00.00 ROUTINE HEALTH MAINTENANCE: ICD-10-CM

## 2020-12-28 LAB
ALBUMIN SERPL BCP-MCNC: 4.2 G/DL (ref 3.5–5.2)
ALP SERPL-CCNC: 46 U/L (ref 55–135)
ALT SERPL W/O P-5'-P-CCNC: 15 U/L (ref 10–44)
ANION GAP SERPL CALC-SCNC: 8 MMOL/L (ref 8–16)
AST SERPL-CCNC: 17 U/L (ref 10–40)
BILIRUB SERPL-MCNC: 0.7 MG/DL (ref 0.1–1)
BUN SERPL-MCNC: 9 MG/DL (ref 6–20)
CALCIUM SERPL-MCNC: 9.3 MG/DL (ref 8.7–10.5)
CHLORIDE SERPL-SCNC: 106 MMOL/L (ref 95–110)
CHOLEST SERPL-MCNC: 163 MG/DL (ref 120–199)
CHOLEST/HDLC SERPL: 2.7 {RATIO} (ref 2–5)
CO2 SERPL-SCNC: 24 MMOL/L (ref 23–29)
CREAT SERPL-MCNC: 0.8 MG/DL (ref 0.5–1.4)
EST. GFR  (AFRICAN AMERICAN): >60 ML/MIN/1.73 M^2
EST. GFR  (NON AFRICAN AMERICAN): >60 ML/MIN/1.73 M^2
GLUCOSE SERPL-MCNC: 86 MG/DL (ref 70–110)
HDLC SERPL-MCNC: 60 MG/DL (ref 40–75)
HDLC SERPL: 36.8 % (ref 20–50)
LDLC SERPL CALC-MCNC: 95 MG/DL (ref 63–159)
NONHDLC SERPL-MCNC: 103 MG/DL
POTASSIUM SERPL-SCNC: 3.6 MMOL/L (ref 3.5–5.1)
PROT SERPL-MCNC: 8 G/DL (ref 6–8.4)
SODIUM SERPL-SCNC: 138 MMOL/L (ref 136–145)
TRIGL SERPL-MCNC: 40 MG/DL (ref 30–150)

## 2020-12-28 PROCEDURE — 90686 IIV4 VACC NO PRSV 0.5 ML IM: CPT | Mod: S$GLB,,, | Performed by: NURSE PRACTITIONER

## 2020-12-28 PROCEDURE — 86803 HEPATITIS C AB TEST: CPT

## 2020-12-28 PROCEDURE — 99395 PR PREVENTIVE VISIT,EST,18-39: ICD-10-PCS | Mod: 25,S$GLB,, | Performed by: NURSE PRACTITIONER

## 2020-12-28 PROCEDURE — 3008F BODY MASS INDEX DOCD: CPT | Mod: S$GLB,,, | Performed by: NURSE PRACTITIONER

## 2020-12-28 PROCEDURE — 90471 FLU VACCINE (QUAD) GREATER THAN OR EQUAL TO 3YO PRESERVATIVE FREE IM: ICD-10-PCS | Mod: S$GLB,,, | Performed by: NURSE PRACTITIONER

## 2020-12-28 PROCEDURE — 99395 PREV VISIT EST AGE 18-39: CPT | Mod: 25,S$GLB,, | Performed by: NURSE PRACTITIONER

## 2020-12-28 PROCEDURE — 90471 IMMUNIZATION ADMIN: CPT | Mod: S$GLB,,, | Performed by: NURSE PRACTITIONER

## 2020-12-28 PROCEDURE — 80061 LIPID PANEL: CPT

## 2020-12-28 PROCEDURE — 1126F PR PAIN SEVERITY QUANTIFIED, NO PAIN PRESENT: ICD-10-PCS | Mod: S$GLB,,, | Performed by: NURSE PRACTITIONER

## 2020-12-28 PROCEDURE — 80053 COMPREHEN METABOLIC PANEL: CPT

## 2020-12-28 PROCEDURE — 1126F AMNT PAIN NOTED NONE PRSNT: CPT | Mod: S$GLB,,, | Performed by: NURSE PRACTITIONER

## 2020-12-28 PROCEDURE — 90686 FLU VACCINE (QUAD) GREATER THAN OR EQUAL TO 3YO PRESERVATIVE FREE IM: ICD-10-PCS | Mod: S$GLB,,, | Performed by: NURSE PRACTITIONER

## 2020-12-28 PROCEDURE — 3008F PR BODY MASS INDEX (BMI) DOCUMENTED: ICD-10-PCS | Mod: S$GLB,,, | Performed by: NURSE PRACTITIONER

## 2020-12-28 NOTE — PROGRESS NOTES
SUBJECTIVE:      Patient ID: Arnie Browne is a 30 y.o. female.    Chief Complaint: Annual Exam    Presents for annual wellness exam, no physical complaints this visit      Past Surgical History:   Procedure Laterality Date    CARDIAC CATHETERIZATION  2006    ASD closure     SECTION  ,,    COLONOSCOPY  2014    HYSTEROSCOPY WITH DILATION AND CURETTAGE OF UTERUS N/A 2020    Procedure: HYSTEROSCOPY, WITH DILATION AND CURETTAGE OF UTERUS;  Surgeon: Julissa Munoz MD;  Location: [facility] OR;  Service: OB/GYN;  Laterality: N/A;    THERMAL ABLATION OF ENDOMETRIUM USING HYSTEROSCOPY N/A 2020    Procedure: ABLATION, ENDOMETRIUM, THERMAL, HYSTEROSCOPIC - Nov[facility];  Surgeon: Julissa Munoz MD;  Location: [facility] OR;  Service: OB/GYN;  Laterality: N/A;    TUBAL LIGATION       Family History   Problem Relation Age of Onset    Arthritis Father     Hypertension Father     Stroke Father     Heart disease Father     No Known Problems Mother     Asthma Sister     Asthma Daughter     Cancer Paternal Grandmother         breast and bone cancer    Cancer Paternal Grandfather         Colon cancer    Diabetes Paternal Grandfather     No Known Problems Daughter     No Known Problems Daughter       Social History     Socioeconomic History    Marital status:      Spouse name: Not on file    Number of children: 3    Years of education: Not on file    Highest education level: Not on file   Occupational History    Occupation:    Social Needs    Financial resource strain: Not on file    Food insecurity     Worry: Not on file     Inability: Not on file    Transportation needs     Medical: Not on file     Non-medical: Not on file   Tobacco Use    Smoking status: Never Smoker    Smokeless tobacco: Never Used   Substance and Sexual Activity    Alcohol use: No     Frequency: 2-4 times a month     Drinks per session: Patient refused     Binge frequency: Never     Drug use: No    Sexual activity: Yes     Partners: Male     Birth control/protection: None   Lifestyle    Physical activity     Days per week: 3 days     Minutes per session: 40 min    Stress: Only a little   Relationships    Social connections     Talks on phone: Not on file     Gets together: Not on file     Attends Orthodox service: Not on file     Active member of club or organization: Not on file     Attends meetings of clubs or organizations: Not on file     Relationship status: Not on file   Other Topics Concern    Not on file   Social History Narrative    Not on file     Current Outpatient Medications   Medication Sig Dispense Refill    ALPRAZolam (XANAX) 0.5 MG tablet Take 1 tablet (0.5 mg total) by mouth 2 (two) times daily as needed for Anxiety. 60 tablet 2    mirtazapine (REMERON) 15 MG tablet Take 1 tablet (15 mg total) by mouth every evening. Can take 2 tablets (30 mg total) by mouth every evening on the 3rd night if no relief (Patient taking differently: Take 15 mg by mouth every evening. Can take 2 tablets (30 mg total) by mouth every evening on the 3rd night if no relief  USE AS USUAL) 30 tablet 11    omeprazole (PRILOSEC) 40 MG capsule Take 40 mg by mouth once daily.      valACYclovir (VALTREX) 500 MG tablet Take 500 mg by mouth as needed. DO NOT TAKE AM OF SURGERY       No current facility-administered medications for this visit.      Review of patient's allergies indicates:  No Known Allergies   Past Medical History:   Diagnosis Date    Anxiety     Chest pain     Herpes simplex without mention of complication     History of Amplatzer atrial septal defect closure 2016    History of atrial septal defect 2016    Migraine     PONV (postoperative nausea and vomiting)     Sickle cell trait     Vaso vagal episode     Vasovagal syncope     last      Past Surgical History:   Procedure Laterality Date    CARDIAC CATHETERIZATION  2006    ASD closure      SECTION  2008,2016,2013    COLONOSCOPY  2014    HYSTEROSCOPY WITH DILATION AND CURETTAGE OF UTERUS N/A 2/28/2020    Procedure: HYSTEROSCOPY, WITH DILATION AND CURETTAGE OF UTERUS;  Surgeon: Julissa Munoz MD;  Location: [facility] OR;  Service: OB/GYN;  Laterality: N/A;    THERMAL ABLATION OF ENDOMETRIUM USING HYSTEROSCOPY N/A 2/28/2020    Procedure: ABLATION, ENDOMETRIUM, THERMAL, HYSTEROSCOPIC - [facility];  Surgeon: Julissa Munoz MD;  Location: [facility] OR;  Service: OB/GYN;  Laterality: N/A;    TUBAL LIGATION  2016       Review of Systems   Constitutional: Negative for activity change, appetite change, fatigue and unexpected weight change.   HENT: Negative for congestion, ear pain, hearing loss, postnasal drip, rhinorrhea, sinus pressure, sinus pain, sneezing, sore throat and trouble swallowing.    Eyes: Negative for photophobia, pain, discharge and visual disturbance.   Respiratory: Negative for cough, chest tightness, shortness of breath and wheezing.    Cardiovascular: Negative for chest pain, palpitations and leg swelling.   Gastrointestinal: Negative for abdominal distention, abdominal pain, blood in stool, constipation, diarrhea, nausea and vomiting.   Endocrine: Negative for cold intolerance, heat intolerance, polydipsia and polyuria.   Genitourinary: Negative for difficulty urinating, dysuria, flank pain, frequency, hematuria, menstrual problem, pelvic pain and urgency.   Musculoskeletal: Negative for arthralgias, back pain, joint swelling, myalgias and neck pain.   Skin: Negative for pallor.   Allergic/Immunologic: Negative for environmental allergies and food allergies.   Neurological: Negative for dizziness, weakness, light-headedness, numbness and headaches.   Hematological: Does not bruise/bleed easily.   Psychiatric/Behavioral: Negative for agitation, confusion, decreased concentration, dysphoric mood and sleep disturbance. The patient is nervous/anxious.       OBJECTIVE:      Vitals:    12/28/20  "1027   BP: (P) 116/70   BP Location: (P) Left arm   Patient Position: (P) Sitting   BP Method: (P) Medium (Manual)   Pulse: 65   Resp: 16   Temp: 97.7 °F (36.5 °C)   TempSrc: Oral   SpO2: 98%   Weight: 86.1 kg (189 lb 12.8 oz)   Height: 5' 5" (1.651 m)     Physical Exam  Vitals signs and nursing note reviewed.   Constitutional:       General: She is not in acute distress.     Appearance: Normal appearance. She is well-developed, well-groomed and overweight.   HENT:      Head: Normocephalic and atraumatic.      Right Ear: Hearing normal.      Left Ear: Hearing normal.      Nose: Nose normal. No rhinorrhea.   Eyes:      General: Lids are normal.         Right eye: No discharge.         Left eye: No discharge.      Conjunctiva/sclera: Conjunctivae normal.      Right eye: Right conjunctiva is not injected.      Left eye: Left conjunctiva is not injected.      Pupils: Pupils are equal, round, and reactive to light. Pupils are equal.      Right eye: Pupil is round and reactive.      Left eye: Pupil is round and reactive.   Neck:      Musculoskeletal: Normal range of motion and neck supple.      Thyroid: No thyromegaly.      Vascular: No JVD.      Trachea: Trachea normal. No tracheal deviation.   Cardiovascular:      Rate and Rhythm: Normal rate and regular rhythm.      Pulses:           Radial pulses are 2+ on the right side and 2+ on the left side.      Heart sounds: Normal heart sounds. No murmur. No friction rub. No gallop.    Pulmonary:      Effort: Pulmonary effort is normal. No respiratory distress.      Breath sounds: Normal breath sounds. No stridor. No decreased breath sounds, wheezing, rhonchi or rales.   Abdominal:      General: Bowel sounds are normal. There is no distension.      Palpations: Abdomen is soft. Abdomen is not rigid.      Tenderness: There is no abdominal tenderness. There is no guarding.   Musculoskeletal: Normal range of motion.   Lymphadenopathy:      Cervical: No cervical adenopathy. "   Skin:     General: Skin is warm and dry.      Capillary Refill: Capillary refill takes less than 2 seconds.      Coloration: Skin is not pale.      Findings: No lesion or rash.   Neurological:      Mental Status: She is alert and oriented to person, place, and time.      Motor: No atrophy.      Coordination: Coordination normal.      Gait: Gait normal.   Psychiatric:         Attention and Perception: She is attentive.         Speech: Speech normal.         Behavior: Behavior normal.         Thought Content: Thought content normal.         Judgment: Judgment normal.        Assessment:       1. Routine health maintenance    2. Screening for diabetes mellitus    3. Encounter for lipid screening for cardiovascular disease    4. Encounter for hepatitis C screening test for low risk patient    5. Need for influenza vaccination        Plan:       Routine health maintenance  -     Comprehensive Metabolic Panel; Future; Expected date: 12/28/2020  -     Lipid Panel; Future; Expected date: 12/28/2020    Screening for diabetes mellitus  -     Comprehensive Metabolic Panel; Future; Expected date: 12/28/2020    Encounter for lipid screening for cardiovascular disease  -     Lipid Panel; Future; Expected date: 12/28/2020    Encounter for hepatitis C screening test for low risk patient  -     Hepatitis C Antibody; Future; Expected date: 12/28/2020    Need for influenza vaccination  -     Influenza - Quadrivalent (PF)        Follow up in about 3 months (around 3/28/2021) for anxiety recheck.      12/28/2020 AMALIA Gonzalez, FNP-C

## 2020-12-29 LAB — HCV AB S/CO SERPL IA: <0.1 S/CO RATIO (ref 0–0.9)

## 2021-02-10 ENCOUNTER — PATIENT MESSAGE (OUTPATIENT)
Dept: FAMILY MEDICINE | Facility: CLINIC | Age: 31
End: 2021-02-10

## 2021-02-18 ENCOUNTER — OFFICE VISIT (OUTPATIENT)
Dept: FAMILY MEDICINE | Facility: CLINIC | Age: 31
End: 2021-02-18
Payer: COMMERCIAL

## 2021-02-18 DIAGNOSIS — F41.1 GAD (GENERALIZED ANXIETY DISORDER): Primary | ICD-10-CM

## 2021-02-18 PROCEDURE — 99213 PR OFFICE/OUTPT VISIT, EST, LEVL III, 20-29 MIN: ICD-10-PCS | Mod: 95,,, | Performed by: NURSE PRACTITIONER

## 2021-02-18 PROCEDURE — 99213 OFFICE O/P EST LOW 20 MIN: CPT | Mod: 95,,, | Performed by: NURSE PRACTITIONER

## 2021-02-18 RX ORDER — MUPIROCIN 20 MG/G
OINTMENT TOPICAL
COMMUNITY
Start: 2021-01-24

## 2021-02-18 RX ORDER — IBUPROFEN 600 MG/1
TABLET ORAL
COMMUNITY

## 2021-02-18 RX ORDER — CHLORHEXIDINE GLUCONATE 4 %
LIQUID (ML) TOPICAL
COMMUNITY
Start: 2020-12-27

## 2021-02-18 RX ORDER — VENLAFAXINE HYDROCHLORIDE 75 MG/1
75 CAPSULE, EXTENDED RELEASE ORAL DAILY
Qty: 30 CAPSULE | Refills: 11 | Status: SHIPPED | OUTPATIENT
Start: 2021-02-18 | End: 2022-07-07

## 2021-02-18 RX ORDER — MUPIROCIN 20 MG/G
OINTMENT TOPICAL
COMMUNITY
End: 2022-07-07

## 2021-02-23 ENCOUNTER — PATIENT MESSAGE (OUTPATIENT)
Dept: FAMILY MEDICINE | Facility: CLINIC | Age: 31
End: 2021-02-23

## 2021-02-23 DIAGNOSIS — R11.0 NAUSEA: Primary | ICD-10-CM

## 2021-02-24 RX ORDER — ONDANSETRON 4 MG/1
4 TABLET, FILM COATED ORAL EVERY 12 HOURS PRN
Qty: 30 TABLET | Refills: 0 | Status: SHIPPED | OUTPATIENT
Start: 2021-02-24 | End: 2021-03-29

## 2021-03-24 ENCOUNTER — HOSPITAL ENCOUNTER (EMERGENCY)
Facility: HOSPITAL | Age: 31
Discharge: HOME | End: 2021-03-24
Attending: EMERGENCY MEDICINE
Payer: COMMERCIAL

## 2021-03-24 VITALS
TEMPERATURE: 98 F | RESPIRATION RATE: 17 BRPM | DIASTOLIC BLOOD PRESSURE: 78 MMHG | HEART RATE: 77 BPM | SYSTOLIC BLOOD PRESSURE: 118 MMHG | OXYGEN SATURATION: 100 %

## 2021-03-24 DIAGNOSIS — R55 SYNCOPE: ICD-10-CM

## 2021-03-24 DIAGNOSIS — R56.9 SEIZURE: Primary | ICD-10-CM

## 2021-03-24 DIAGNOSIS — T14.90XA TRAUMA: ICD-10-CM

## 2021-03-24 DIAGNOSIS — R40.20 LOSS OF CONSCIOUSNESS: ICD-10-CM

## 2021-03-24 LAB
ALBUMIN SERPL BCP-MCNC: 4.4 G/DL (ref 3.5–5.2)
ALP SERPL-CCNC: 47 U/L (ref 55–135)
ALT SERPL W/O P-5'-P-CCNC: 16 U/L (ref 10–44)
ANION GAP SERPL CALC-SCNC: 11 MMOL/L (ref 8–16)
AST SERPL-CCNC: 19 U/L (ref 10–40)
B-HCG UR QL: NEGATIVE
BASOPHILS # BLD AUTO: 0.04 K/UL (ref 0–0.2)
BASOPHILS NFR BLD: 0.8 % (ref 0–1.9)
BILIRUB SERPL-MCNC: 1.1 MG/DL (ref 0.1–1)
BNP SERPL-MCNC: 75 PG/ML (ref 0–99)
BUN SERPL-MCNC: 9 MG/DL (ref 6–20)
CALCIUM SERPL-MCNC: 9.8 MG/DL (ref 8.7–10.5)
CHLORIDE SERPL-SCNC: 105 MMOL/L (ref 95–110)
CO2 SERPL-SCNC: 22 MMOL/L (ref 23–29)
CREAT SERPL-MCNC: 0.8 MG/DL (ref 0.5–1.4)
CTP QC/QA: YES
DIFFERENTIAL METHOD: ABNORMAL
EOSINOPHIL # BLD AUTO: 0.2 K/UL (ref 0–0.5)
EOSINOPHIL NFR BLD: 3.4 % (ref 0–8)
ERYTHROCYTE [DISTWIDTH] IN BLOOD BY AUTOMATED COUNT: 14 % (ref 11.5–14.5)
EST. GFR  (AFRICAN AMERICAN): >60 ML/MIN/1.73 M^2
EST. GFR  (NON AFRICAN AMERICAN): >60 ML/MIN/1.73 M^2
GLUCOSE SERPL-MCNC: 80 MG/DL (ref 70–110)
HCT VFR BLD AUTO: 42.4 % (ref 37–48.5)
HGB BLD-MCNC: 14.3 G/DL (ref 12–16)
IMM GRANULOCYTES # BLD AUTO: 0.01 K/UL (ref 0–0.04)
IMM GRANULOCYTES NFR BLD AUTO: 0.2 % (ref 0–0.5)
LYMPHOCYTES # BLD AUTO: 2.3 K/UL (ref 1–4.8)
LYMPHOCYTES NFR BLD: 42.6 % (ref 18–48)
MCH RBC QN AUTO: 29.5 PG (ref 27–31)
MCHC RBC AUTO-ENTMCNC: 33.7 G/DL (ref 32–36)
MCV RBC AUTO: 88 FL (ref 82–98)
MONOCYTES # BLD AUTO: 0.8 K/UL (ref 0.3–1)
MONOCYTES NFR BLD: 15.4 % (ref 4–15)
NEUTROPHILS # BLD AUTO: 2 K/UL (ref 1.8–7.7)
NEUTROPHILS NFR BLD: 37.6 % (ref 38–73)
NRBC BLD-RTO: 0 /100 WBC
PLATELET # BLD AUTO: 347 K/UL (ref 150–350)
PMV BLD AUTO: 10 FL (ref 9.2–12.9)
POTASSIUM SERPL-SCNC: 3.8 MMOL/L (ref 3.5–5.1)
PROT SERPL-MCNC: 8.2 G/DL (ref 6–8.4)
RBC # BLD AUTO: 4.84 M/UL (ref 4–5.4)
SODIUM SERPL-SCNC: 138 MMOL/L (ref 136–145)
TROPONIN I SERPL DL<=0.01 NG/ML-MCNC: <0.03 NG/ML
WBC # BLD AUTO: 5.33 K/UL (ref 3.9–12.7)

## 2021-03-24 PROCEDURE — 83880 ASSAY OF NATRIURETIC PEPTIDE: CPT | Performed by: NURSE PRACTITIONER

## 2021-03-24 PROCEDURE — 93005 ELECTROCARDIOGRAM TRACING: CPT | Performed by: SPECIALIST

## 2021-03-24 PROCEDURE — 99285 EMERGENCY DEPT VISIT HI MDM: CPT | Mod: 25

## 2021-03-24 PROCEDURE — 84484 ASSAY OF TROPONIN QUANT: CPT | Performed by: NURSE PRACTITIONER

## 2021-03-24 PROCEDURE — 85025 COMPLETE CBC W/AUTO DIFF WBC: CPT | Performed by: NURSE PRACTITIONER

## 2021-03-24 PROCEDURE — 36415 COLL VENOUS BLD VENIPUNCTURE: CPT | Performed by: NURSE PRACTITIONER

## 2021-03-24 PROCEDURE — 93010 EKG 12-LEAD: ICD-10-PCS | Mod: ,,, | Performed by: SPECIALIST

## 2021-03-24 PROCEDURE — 80053 COMPREHEN METABOLIC PANEL: CPT | Performed by: NURSE PRACTITIONER

## 2021-03-24 PROCEDURE — 81025 URINE PREGNANCY TEST: CPT | Performed by: EMERGENCY MEDICINE

## 2021-03-24 PROCEDURE — 93010 ELECTROCARDIOGRAM REPORT: CPT | Mod: ,,, | Performed by: SPECIALIST

## 2021-03-29 ENCOUNTER — PATIENT MESSAGE (OUTPATIENT)
Dept: FAMILY MEDICINE | Facility: CLINIC | Age: 31
End: 2021-03-29

## 2021-03-29 ENCOUNTER — OFFICE VISIT (OUTPATIENT)
Dept: FAMILY MEDICINE | Facility: CLINIC | Age: 31
End: 2021-03-29
Payer: COMMERCIAL

## 2021-03-29 DIAGNOSIS — F41.8 SITUATIONAL ANXIETY: ICD-10-CM

## 2021-03-29 PROCEDURE — 99213 OFFICE O/P EST LOW 20 MIN: CPT | Mod: 95,,, | Performed by: NURSE PRACTITIONER

## 2021-03-29 PROCEDURE — 99213 PR OFFICE/OUTPT VISIT, EST, LEVL III, 20-29 MIN: ICD-10-PCS | Mod: 95,,, | Performed by: NURSE PRACTITIONER

## 2021-03-29 RX ORDER — ALPRAZOLAM 0.5 MG/1
0.5 TABLET ORAL 2 TIMES DAILY PRN
Qty: 60 TABLET | Refills: 2 | Status: SHIPPED | OUTPATIENT
Start: 2021-03-29 | End: 2021-04-29 | Stop reason: SDUPTHER

## 2021-03-31 DIAGNOSIS — R55 SYNCOPE AND COLLAPSE: Primary | ICD-10-CM

## 2021-04-01 ENCOUNTER — OFFICE VISIT (OUTPATIENT)
Dept: CARDIOLOGY | Facility: CLINIC | Age: 31
End: 2021-04-01
Payer: COMMERCIAL

## 2021-04-01 VITALS
BODY MASS INDEX: 31.32 KG/M2 | HEART RATE: 76 BPM | OXYGEN SATURATION: 97 % | DIASTOLIC BLOOD PRESSURE: 80 MMHG | HEIGHT: 65 IN | SYSTOLIC BLOOD PRESSURE: 122 MMHG | WEIGHT: 188 LBS

## 2021-04-01 DIAGNOSIS — R55 SYNCOPE AND COLLAPSE: ICD-10-CM

## 2021-04-01 DIAGNOSIS — Z87.74 HISTORY OF ATRIAL SEPTAL DEFECT: ICD-10-CM

## 2021-04-01 PROCEDURE — 99204 OFFICE O/P NEW MOD 45 MIN: CPT | Mod: S$GLB,,, | Performed by: INTERNAL MEDICINE

## 2021-04-01 PROCEDURE — 99204 PR OFFICE/OUTPT VISIT, NEW, LEVL IV, 45-59 MIN: ICD-10-PCS | Mod: S$GLB,,, | Performed by: INTERNAL MEDICINE

## 2021-04-01 PROCEDURE — 3008F PR BODY MASS INDEX (BMI) DOCUMENTED: ICD-10-PCS | Mod: CPTII,S$GLB,, | Performed by: INTERNAL MEDICINE

## 2021-04-01 PROCEDURE — 3008F BODY MASS INDEX DOCD: CPT | Mod: CPTII,S$GLB,, | Performed by: INTERNAL MEDICINE

## 2021-04-01 RX ORDER — BUTALBITAL, ACETAMINOPHEN AND CAFFEINE 50; 325; 40 MG/1; MG/1; MG/1
1 TABLET ORAL EVERY 4 HOURS PRN
COMMUNITY
End: 2022-07-07

## 2021-04-07 ENCOUNTER — IMMUNIZATION (OUTPATIENT)
Dept: PRIMARY CARE CLINIC | Facility: CLINIC | Age: 31
End: 2021-04-07
Payer: COMMERCIAL

## 2021-04-07 DIAGNOSIS — Z23 NEED FOR VACCINATION: Primary | ICD-10-CM

## 2021-04-07 PROCEDURE — 0001A COVID-19, MRNA, LNP-S, PF, 30 MCG/0.3 ML DOSE VACCINE: ICD-10-PCS | Mod: CV19,S$GLB,, | Performed by: FAMILY MEDICINE

## 2021-04-07 PROCEDURE — 91300 COVID-19, MRNA, LNP-S, PF, 30 MCG/0.3 ML DOSE VACCINE: CPT | Mod: S$GLB,,, | Performed by: FAMILY MEDICINE

## 2021-04-07 PROCEDURE — 91300 COVID-19, MRNA, LNP-S, PF, 30 MCG/0.3 ML DOSE VACCINE: ICD-10-PCS | Mod: S$GLB,,, | Performed by: FAMILY MEDICINE

## 2021-04-07 PROCEDURE — 0001A COVID-19, MRNA, LNP-S, PF, 30 MCG/0.3 ML DOSE VACCINE: CPT | Mod: CV19,S$GLB,, | Performed by: FAMILY MEDICINE

## 2021-04-12 ENCOUNTER — HOSPITAL ENCOUNTER (OUTPATIENT)
Dept: CARDIOLOGY | Facility: CLINIC | Age: 31
Discharge: HOME | End: 2021-04-12
Attending: INTERNAL MEDICINE
Payer: COMMERCIAL

## 2021-04-12 ENCOUNTER — HOSPITAL ENCOUNTER (OUTPATIENT)
Dept: RADIOLOGY | Facility: CLINIC | Age: 31
Discharge: HOME | End: 2021-04-12
Attending: INTERNAL MEDICINE
Payer: COMMERCIAL

## 2021-04-12 ENCOUNTER — HOSPITAL ENCOUNTER (OUTPATIENT)
Dept: CARDIOLOGY | Facility: CLINIC | Age: 31
Discharge: HOME | End: 2021-04-12
Attending: NURSE PRACTITIONER
Payer: COMMERCIAL

## 2021-04-12 VITALS — WEIGHT: 188 LBS | HEIGHT: 65 IN | BODY MASS INDEX: 31.32 KG/M2

## 2021-04-12 DIAGNOSIS — Z87.74 HISTORY OF ATRIAL SEPTAL DEFECT: ICD-10-CM

## 2021-04-12 DIAGNOSIS — R55 SYNCOPE AND COLLAPSE: ICD-10-CM

## 2021-04-12 PROCEDURE — 93306 TTE W/DOPPLER COMPLETE: CPT | Mod: S$GLB,,, | Performed by: INTERNAL MEDICINE

## 2021-04-12 PROCEDURE — A9502 TC99M TETROFOSMIN: HCPCS | Mod: S$GLB,,, | Performed by: INTERNAL MEDICINE

## 2021-04-12 PROCEDURE — 93015 CV STRESS TEST SUPVJ I&R: CPT | Mod: S$GLB,,, | Performed by: INTERNAL MEDICINE

## 2021-04-12 PROCEDURE — 93306 ECHO (CUPID ONLY): ICD-10-PCS | Mod: S$GLB,,, | Performed by: INTERNAL MEDICINE

## 2021-04-12 PROCEDURE — 78452 HT MUSCLE IMAGE SPECT MULT: CPT | Mod: S$GLB,,, | Performed by: INTERNAL MEDICINE

## 2021-04-12 PROCEDURE — 93015 STRESS TEST WITH MYOCARDIAL PERFUSION (CUPID ONLY): ICD-10-PCS | Mod: S$GLB,,, | Performed by: INTERNAL MEDICINE

## 2021-04-12 PROCEDURE — 78452 STRESS TEST WITH MYOCARDIAL PERFUSION (CUPID ONLY): ICD-10-PCS | Mod: S$GLB,,, | Performed by: INTERNAL MEDICINE

## 2021-04-12 PROCEDURE — 93224 HOLTER MONITOR - 24 HOUR (CUPID ONLY): ICD-10-PCS | Mod: S$GLB,,, | Performed by: INTERNAL MEDICINE

## 2021-04-12 PROCEDURE — 93224 XTRNL ECG REC UP TO 48 HRS: CPT | Mod: S$GLB,,, | Performed by: INTERNAL MEDICINE

## 2021-04-12 PROCEDURE — A9502 STRESS TEST WITH MYOCARDIAL PERFUSION (CUPID ONLY): ICD-10-PCS | Mod: S$GLB,,, | Performed by: INTERNAL MEDICINE

## 2021-04-13 ENCOUNTER — TELEPHONE (OUTPATIENT)
Dept: CARDIOLOGY | Facility: CLINIC | Age: 31
End: 2021-04-13

## 2021-04-21 ENCOUNTER — HOSPITAL ENCOUNTER (OUTPATIENT)
Dept: RADIOLOGY | Facility: HOSPITAL | Age: 31
Discharge: HOME | End: 2021-04-21
Attending: NURSE PRACTITIONER
Payer: COMMERCIAL

## 2021-04-21 DIAGNOSIS — R55 SYNCOPE AND COLLAPSE: ICD-10-CM

## 2021-04-21 PROCEDURE — 93880 EXTRACRANIAL BILAT STUDY: CPT | Mod: TC

## 2021-04-21 PROCEDURE — 70544 MR ANGIOGRAPHY HEAD W/O DYE: CPT | Mod: TC,59

## 2021-04-21 PROCEDURE — 70551 MRI BRAIN STEM W/O DYE: CPT | Mod: TC

## 2021-04-22 LAB
AORTIC ROOT ANNULUS: 2.7 CM
AORTIC VALVE CUSP SEPERATION: 2 CM
AV INDEX (PROSTH): 0.89
AV MEAN GRADIENT: 3 MMHG
AV PEAK GRADIENT: 7 MMHG
AV VALVE AREA: 3.36 CM2
AV VELOCITY RATIO: 0.76
BSA FOR ECHO PROCEDURE: 1.98 M2
CV ECHO LV RWT: 0.44 CM
CV STRESS BASE HR: 62 BPM
DIASTOLIC BLOOD PRESSURE: 72 MMHG
DOP CALC AO PEAK VEL: 1.31 M/S
DOP CALC AO VTI: 27.1 CM
DOP CALC LVOT AREA: 3.8 CM2
DOP CALC LVOT DIAMETER: 2.2 CM
DOP CALC LVOT PEAK VEL: 0.99 M/S
DOP CALC LVOT STROKE VOLUME: 91.19 CM3
DOP CALCLVOT PEAK VEL VTI: 24 CM
E WAVE DECELERATION TIME: 179 MS
E/A RATIO: 1.59
E/E' RATIO: 5.84 M/S
ECHO LV POSTERIOR WALL: 0.95 CM (ref 0.6–1.1)
EJECTION FRACTION- HIGH: 65 %
EJECTION FRACTION: 60 %
END DIASTOLIC INDEX-HIGH: 158 ML/M2
END DIASTOLIC INDEX-LOW: 94 ML/M2
END SYSTOLIC INDEX-HIGH: 71 ML/M2
END SYSTOLIC INDEX-LOW: 33 ML/M2
FRACTIONAL SHORTENING: 34 % (ref 28–44)
INTERVENTRICULAR SEPTUM: 0.94 CM (ref 0.6–1.1)
IVRT: 79 MS
LA MAJOR: 3.1 CM
LEFT ATRIUM SIZE: 3.5 CM
LEFT INTERNAL DIMENSION IN SYSTOLE: 2.86 CM (ref 2.1–4)
LEFT VENTRICLE MASS INDEX: 70 G/M2
LEFT VENTRICULAR INTERNAL DIMENSION IN DIASTOLE: 4.36 CM (ref 3.5–6)
LEFT VENTRICULAR MASS: 134.77 G
LV LATERAL E/E' RATIO: 4.87 M/S
LV SEPTAL E/E' RATIO: 7.3 M/S
MV PEAK A VEL: 0.46 M/S
MV PEAK E VEL: 0.73 M/S
NUC STRESS DIASTOLIC VOLUME INDEX: 68
NUC STRESS EJECTION FRACTION: 68 %
NUC STRESS SYSTOLIC VOLUME INDEX: 22
OHS CV CPX 1 MINUTE RECOVERY HEART RATE: 150 BPM
OHS CV CPX 85 PERCENT MAX PREDICTED HEART RATE MALE: 153
OHS CV CPX ESTIMATED METS: 11
OHS CV CPX MAX PREDICTED HEART RATE: 180
OHS CV CPX PATIENT IS FEMALE: 1
OHS CV CPX PATIENT IS MALE: 0
OHS CV CPX PEAK DIASTOLIC BLOOD PRESSURE: 74 MMHG
OHS CV CPX PEAK HEAR RATE: 173 BPM
OHS CV CPX PEAK RATE PRESSURE PRODUCT: NORMAL
OHS CV CPX PEAK SYSTOLIC BLOOD PRESSURE: 154 MMHG
OHS CV CPX PERCENT MAX PREDICTED HEART RATE ACHIEVED: 96
OHS CV CPX RATE PRESSURE PRODUCT PRESENTING: 7440
PISA TR MAX VEL: 2.01 M/S
RA PRESSURE: 3 MMHG
RETIRED EF AND QEF - SEE NOTES: 53 %
RIGHT VENTRICULAR END-DIASTOLIC DIMENSION: 2.25 CM
STRESS ECHO POST EXERCISE DUR MIN: 9 MINUTES
STRESS ECHO POST EXERCISE DUR SEC: 16 SECONDS
SYSTOLIC BLOOD PRESSURE: 120 MMHG
TDI LATERAL: 0.15 M/S
TDI SEPTAL: 0.1 M/S
TDI: 0.13 M/S
TR MAX PG: 16 MMHG
TV REST PULMONARY ARTERY PRESSURE: 19 MMHG

## 2021-04-28 ENCOUNTER — IMMUNIZATION (OUTPATIENT)
Dept: PRIMARY CARE CLINIC | Facility: CLINIC | Age: 31
End: 2021-04-28
Payer: COMMERCIAL

## 2021-04-28 DIAGNOSIS — Z23 NEED FOR VACCINATION: Primary | ICD-10-CM

## 2021-04-28 PROCEDURE — 0002A COVID-19, MRNA, LNP-S, PF, 30 MCG/0.3 ML DOSE VACCINE: CPT | Mod: CV19,S$GLB,, | Performed by: FAMILY MEDICINE

## 2021-04-28 PROCEDURE — 91300 COVID-19, MRNA, LNP-S, PF, 30 MCG/0.3 ML DOSE VACCINE: CPT | Mod: S$GLB,,, | Performed by: FAMILY MEDICINE

## 2021-04-28 PROCEDURE — 0002A COVID-19, MRNA, LNP-S, PF, 30 MCG/0.3 ML DOSE VACCINE: ICD-10-PCS | Mod: CV19,S$GLB,, | Performed by: FAMILY MEDICINE

## 2021-04-28 PROCEDURE — 91300 COVID-19, MRNA, LNP-S, PF, 30 MCG/0.3 ML DOSE VACCINE: ICD-10-PCS | Mod: S$GLB,,, | Performed by: FAMILY MEDICINE

## 2021-04-29 ENCOUNTER — OFFICE VISIT (OUTPATIENT)
Dept: FAMILY MEDICINE | Facility: CLINIC | Age: 31
End: 2021-04-29
Payer: COMMERCIAL

## 2021-04-29 ENCOUNTER — PATIENT MESSAGE (OUTPATIENT)
Dept: FAMILY MEDICINE | Facility: CLINIC | Age: 31
End: 2021-04-29

## 2021-04-29 ENCOUNTER — TELEPHONE (OUTPATIENT)
Dept: FAMILY MEDICINE | Facility: CLINIC | Age: 31
End: 2021-04-29

## 2021-04-29 DIAGNOSIS — M25.561 CHRONIC PAIN OF RIGHT KNEE: ICD-10-CM

## 2021-04-29 DIAGNOSIS — G43.709 CHRONIC MIGRAINE WITHOUT AURA WITHOUT STATUS MIGRAINOSUS, NOT INTRACTABLE: ICD-10-CM

## 2021-04-29 DIAGNOSIS — G89.29 CHRONIC PAIN OF RIGHT KNEE: ICD-10-CM

## 2021-04-29 DIAGNOSIS — F41.8 SITUATIONAL ANXIETY: Primary | ICD-10-CM

## 2021-04-29 PROCEDURE — 99214 PR OFFICE/OUTPT VISIT, EST, LEVL IV, 30-39 MIN: ICD-10-PCS | Mod: 95,,, | Performed by: NURSE PRACTITIONER

## 2021-04-29 PROCEDURE — 99214 OFFICE O/P EST MOD 30 MIN: CPT | Mod: 95,,, | Performed by: NURSE PRACTITIONER

## 2021-04-29 RX ORDER — ALPRAZOLAM 0.5 MG/1
0.5 TABLET ORAL 2 TIMES DAILY PRN
Qty: 60 TABLET | Refills: 2 | Status: SHIPPED | OUTPATIENT
Start: 2021-04-29 | End: 2022-07-07 | Stop reason: SDUPTHER

## 2021-04-29 RX ORDER — UBROGEPANT 100 MG/1
100 TABLET ORAL ONCE AS NEEDED
Qty: 4 TABLET | Refills: 0 | COMMUNITY
Start: 2021-04-29 | End: 2021-04-29

## 2021-05-19 LAB
OHS CV EVENT MONITOR DAY: 0
OHS CV HOLTER LENGTH DECIMAL HOURS: 24
OHS CV HOLTER LENGTH HOURS: 24
OHS CV HOLTER LENGTH MINUTES: 0

## 2021-06-10 ENCOUNTER — PATIENT MESSAGE (OUTPATIENT)
Dept: FAMILY MEDICINE | Facility: CLINIC | Age: 31
End: 2021-06-10

## 2021-06-10 DIAGNOSIS — T75.3XXA: Primary | ICD-10-CM

## 2021-06-10 RX ORDER — ONDANSETRON 4 MG/1
4 TABLET, ORALLY DISINTEGRATING ORAL 2 TIMES DAILY PRN
Qty: 30 TABLET | Refills: 0 | Status: SHIPPED | OUTPATIENT
Start: 2021-06-10 | End: 2022-07-07

## 2021-07-29 ENCOUNTER — OFFICE VISIT (OUTPATIENT)
Dept: ORTHOPEDICS | Facility: CLINIC | Age: 31
End: 2021-07-29
Payer: COMMERCIAL

## 2021-07-29 VITALS — WEIGHT: 185 LBS | BODY MASS INDEX: 30.82 KG/M2 | HEIGHT: 65 IN

## 2021-07-29 DIAGNOSIS — M94.261 CHONDROMALACIA, RIGHT KNEE: ICD-10-CM

## 2021-07-29 DIAGNOSIS — S83.281A TEAR OF LATERAL MENISCUS OF RIGHT KNEE, UNSPECIFIED TEAR TYPE, UNSPECIFIED WHETHER OLD OR CURRENT TEAR, INITIAL ENCOUNTER: Primary | ICD-10-CM

## 2021-07-29 PROCEDURE — 1125F PR PAIN SEVERITY QUANTIFIED, PAIN PRESENT: ICD-10-PCS | Mod: S$GLB,,, | Performed by: ORTHOPAEDIC SURGERY

## 2021-07-29 PROCEDURE — 99204 OFFICE O/P NEW MOD 45 MIN: CPT | Mod: S$GLB,,, | Performed by: ORTHOPAEDIC SURGERY

## 2021-07-29 PROCEDURE — 1160F RVW MEDS BY RX/DR IN RCRD: CPT | Mod: S$GLB,,, | Performed by: ORTHOPAEDIC SURGERY

## 2021-07-29 PROCEDURE — 3008F PR BODY MASS INDEX (BMI) DOCUMENTED: ICD-10-PCS | Mod: S$GLB,,, | Performed by: ORTHOPAEDIC SURGERY

## 2021-07-29 PROCEDURE — 1125F AMNT PAIN NOTED PAIN PRSNT: CPT | Mod: S$GLB,,, | Performed by: ORTHOPAEDIC SURGERY

## 2021-07-29 PROCEDURE — 99204 PR OFFICE/OUTPT VISIT, NEW, LEVL IV, 45-59 MIN: ICD-10-PCS | Mod: S$GLB,,, | Performed by: ORTHOPAEDIC SURGERY

## 2021-07-29 PROCEDURE — 3008F BODY MASS INDEX DOCD: CPT | Mod: S$GLB,,, | Performed by: ORTHOPAEDIC SURGERY

## 2021-07-29 PROCEDURE — 1160F PR REVIEW ALL MEDS BY PRESCRIBER/CLIN PHARMACIST DOCUMENTED: ICD-10-PCS | Mod: S$GLB,,, | Performed by: ORTHOPAEDIC SURGERY

## 2021-07-29 RX ORDER — TOPIRAMATE 25 MG/1
TABLET ORAL
COMMUNITY
Start: 2021-04-29 | End: 2022-07-07

## 2021-07-29 RX ORDER — FOLIC ACID 1 MG/1
TABLET ORAL
COMMUNITY
Start: 2021-05-28

## 2021-09-08 ENCOUNTER — HOSPITAL ENCOUNTER (OUTPATIENT)
Dept: RADIOLOGY | Facility: HOSPITAL | Age: 31
Discharge: HOME | End: 2021-09-08
Attending: ORTHOPAEDIC SURGERY
Payer: COMMERCIAL

## 2021-09-08 DIAGNOSIS — S83.281A TEAR OF LATERAL MENISCUS OF RIGHT KNEE, UNSPECIFIED TEAR TYPE, UNSPECIFIED WHETHER OLD OR CURRENT TEAR, INITIAL ENCOUNTER: ICD-10-CM

## 2021-09-08 DIAGNOSIS — M94.261 CHONDROMALACIA, RIGHT KNEE: ICD-10-CM

## 2021-09-08 PROCEDURE — 73721 MRI JNT OF LWR EXTRE W/O DYE: CPT | Mod: TC,PO,RT

## 2021-12-09 ENCOUNTER — IMMUNIZATION (OUTPATIENT)
Dept: PRIMARY CARE CLINIC | Facility: CLINIC | Age: 31
End: 2021-12-09
Payer: COMMERCIAL

## 2021-12-09 DIAGNOSIS — Z23 NEED FOR VACCINATION: Primary | ICD-10-CM

## 2021-12-09 PROCEDURE — 0004A COVID-19, MRNA, LNP-S, PF, 30 MCG/0.3 ML DOSE VACCINE: CPT | Mod: S$GLB,,, | Performed by: FAMILY MEDICINE

## 2021-12-09 PROCEDURE — 91300 COVID-19, MRNA, LNP-S, PF, 30 MCG/0.3 ML DOSE VACCINE: ICD-10-PCS | Mod: S$GLB,,, | Performed by: FAMILY MEDICINE

## 2021-12-09 PROCEDURE — 91300 COVID-19, MRNA, LNP-S, PF, 30 MCG/0.3 ML DOSE VACCINE: CPT | Mod: S$GLB,,, | Performed by: FAMILY MEDICINE

## 2021-12-09 PROCEDURE — 0004A COVID-19, MRNA, LNP-S, PF, 30 MCG/0.3 ML DOSE VACCINE: ICD-10-PCS | Mod: S$GLB,,, | Performed by: FAMILY MEDICINE

## 2022-07-07 ENCOUNTER — OFFICE VISIT (OUTPATIENT)
Dept: FAMILY MEDICINE | Facility: CLINIC | Age: 32
End: 2022-07-07
Payer: COMMERCIAL

## 2022-07-07 VITALS
SYSTOLIC BLOOD PRESSURE: 112 MMHG | DIASTOLIC BLOOD PRESSURE: 78 MMHG | OXYGEN SATURATION: 97 % | WEIGHT: 195 LBS | HEART RATE: 76 BPM | BODY MASS INDEX: 32.49 KG/M2 | HEIGHT: 65 IN

## 2022-07-07 DIAGNOSIS — F41.8 SITUATIONAL ANXIETY: ICD-10-CM

## 2022-07-07 DIAGNOSIS — Z00.00 ROUTINE HEALTH MAINTENANCE: Primary | ICD-10-CM

## 2022-07-07 DIAGNOSIS — R41.840 ATTENTION AND CONCENTRATION DEFICIT: Primary | ICD-10-CM

## 2022-07-07 DIAGNOSIS — L65.9 ALOPECIA: ICD-10-CM

## 2022-07-07 PROCEDURE — 99395 PREV VISIT EST AGE 18-39: CPT | Mod: S$GLB,,, | Performed by: NURSE PRACTITIONER

## 2022-07-07 PROCEDURE — 3008F PR BODY MASS INDEX (BMI) DOCUMENTED: ICD-10-PCS | Mod: CPTII,S$GLB,, | Performed by: NURSE PRACTITIONER

## 2022-07-07 PROCEDURE — 3074F SYST BP LT 130 MM HG: CPT | Mod: CPTII,S$GLB,, | Performed by: NURSE PRACTITIONER

## 2022-07-07 PROCEDURE — 3008F BODY MASS INDEX DOCD: CPT | Mod: CPTII,S$GLB,, | Performed by: NURSE PRACTITIONER

## 2022-07-07 PROCEDURE — 1160F RVW MEDS BY RX/DR IN RCRD: CPT | Mod: CPTII,S$GLB,, | Performed by: NURSE PRACTITIONER

## 2022-07-07 PROCEDURE — 3074F PR MOST RECENT SYSTOLIC BLOOD PRESSURE < 130 MM HG: ICD-10-PCS | Mod: CPTII,S$GLB,, | Performed by: NURSE PRACTITIONER

## 2022-07-07 PROCEDURE — 1159F MED LIST DOCD IN RCRD: CPT | Mod: CPTII,S$GLB,, | Performed by: NURSE PRACTITIONER

## 2022-07-07 PROCEDURE — 99395 PR PREVENTIVE VISIT,EST,18-39: ICD-10-PCS | Mod: S$GLB,,, | Performed by: NURSE PRACTITIONER

## 2022-07-07 PROCEDURE — 3078F PR MOST RECENT DIASTOLIC BLOOD PRESSURE < 80 MM HG: ICD-10-PCS | Mod: CPTII,S$GLB,, | Performed by: NURSE PRACTITIONER

## 2022-07-07 PROCEDURE — 1159F PR MEDICATION LIST DOCUMENTED IN MEDICAL RECORD: ICD-10-PCS | Mod: CPTII,S$GLB,, | Performed by: NURSE PRACTITIONER

## 2022-07-07 PROCEDURE — 3078F DIAST BP <80 MM HG: CPT | Mod: CPTII,S$GLB,, | Performed by: NURSE PRACTITIONER

## 2022-07-07 PROCEDURE — 1160F PR REVIEW ALL MEDS BY PRESCRIBER/CLIN PHARMACIST DOCUMENTED: ICD-10-PCS | Mod: CPTII,S$GLB,, | Performed by: NURSE PRACTITIONER

## 2022-07-07 RX ORDER — ALPRAZOLAM 0.5 MG/1
0.5 TABLET ORAL 2 TIMES DAILY PRN
Qty: 60 TABLET | Refills: 0 | Status: SHIPPED | OUTPATIENT
Start: 2022-07-07 | End: 2023-02-07 | Stop reason: SDUPTHER

## 2022-07-07 RX ORDER — DEXTROAMPHETAMINE SACCHARATE, AMPHETAMINE ASPARTATE MONOHYDRATE, DEXTROAMPHETAMINE SULFATE AND AMPHETAMINE SULFATE 2.5; 2.5; 2.5; 2.5 MG/1; MG/1; MG/1; MG/1
10 CAPSULE, EXTENDED RELEASE ORAL EVERY MORNING
Qty: 30 CAPSULE | Refills: 0 | Status: SHIPPED | OUTPATIENT
Start: 2022-07-07 | End: 2022-07-21 | Stop reason: DRUGHIGH

## 2022-07-07 NOTE — PROGRESS NOTES
SUBJECTIVE:      Patient ID: Arnie Browne is a 31 y.o. female.    Chief Complaint: Annual Exam (Annual/Well Exam )    Presents for annual wellness exam & lab orders, lost to follow-up since 2021 d/t insurance    Discussed outstanding health maintenance       Past Surgical History:   Procedure Laterality Date    CARDIAC CATHETERIZATION  2006    ASD closure     SECTION  ,,    COLONOSCOPY  2014    HYSTEROSCOPY WITH DILATION AND CURETTAGE OF UTERUS N/A 2020    Procedure: HYSTEROSCOPY, WITH DILATION AND CURETTAGE OF UTERUS;  Surgeon: Julissa Munoz MD;  Location: [facility] OR;  Service: OB/GYN;  Laterality: N/A;    THERMAL ABLATION OF ENDOMETRIUM USING HYSTEROSCOPY N/A 2020    Procedure: ABLATION, ENDOMETRIUM, THERMAL, HYSTEROSCOPIC - [facility];  Surgeon: Julissa Munoz MD;  Location: [facility] OR;  Service: OB/GYN;  Laterality: N/A;    TUBAL LIGATION       Family History   Problem Relation Age of Onset    Arthritis Father     Hypertension Father     Stroke Father     Heart disease Father     No Known Problems Mother     Asthma Sister     Asthma Daughter     Cancer Paternal Grandmother         breast and bone cancer    Cancer Paternal Grandfather         Colon cancer    Diabetes Paternal Grandfather     No Known Problems Daughter     No Known Problems Daughter       Social History     Socioeconomic History    Marital status:     Number of children: 3   Occupational History    Occupation:    Tobacco Use    Smoking status: Never Smoker    Smokeless tobacco: Never Used   Substance and Sexual Activity    Alcohol use: No    Drug use: No    Sexual activity: Yes     Partners: Male     Birth control/protection: None     Current Outpatient Medications   Medication Sig Dispense Refill    folic acid (FOLVITE) 1 MG tablet 1 tablet      HIBICLENS 4 % external liquid       ibuprofen (ADVIL,MOTRIN) 600 MG tablet ibuprofen 600 mg tablet       mupirocin (BACTROBAN) 2 % ointment       valACYclovir (VALTREX) 500 MG tablet Take 500 mg by mouth as needed. DO NOT TAKE AM OF SURGERY      ALPRAZolam (XANAX) 0.5 MG tablet Take 1 tablet (0.5 mg total) by mouth 2 (two) times daily as needed for Anxiety. 60 tablet 0    dextroamphetamine-amphetamine (ADDERALL XR) 10 MG 24 hr capsule Take 1 capsule (10 mg total) by mouth every morning. 30 capsule 0     No current facility-administered medications for this visit.     Review of patient's allergies indicates:  No Known Allergies   Past Medical History:   Diagnosis Date    Anxiety     Chest pain     Herpes simplex without mention of complication     History of Amplatzer atrial septal defect closure 2016    History of atrial septal defect 2016    Migraine     PONV (postoperative nausea and vomiting)     Sickle cell trait     Vaso vagal episode     Vasovagal syncope     last      Past Surgical History:   Procedure Laterality Date    CARDIAC CATHETERIZATION  2006    ASD closure     SECTION  ,,    COLONOSCOPY      HYSTEROSCOPY WITH DILATION AND CURETTAGE OF UTERUS N/A 2020    Procedure: HYSTEROSCOPY, WITH DILATION AND CURETTAGE OF UTERUS;  Surgeon: Julissa Munoz MD;  Location: [facility] OR;  Service: OB/GYN;  Laterality: N/A;    THERMAL ABLATION OF ENDOMETRIUM USING HYSTEROSCOPY N/A 2020    Procedure: ABLATION, ENDOMETRIUM, THERMAL, HYSTEROSCOPIC - [facility];  Surgeon: Julissa Munoz MD;  Location: [facility] OR;  Service: OB/GYN;  Laterality: N/A;    TUBAL LIGATION         Review of Systems   Constitutional: Negative for activity change, appetite change, fatigue and unexpected weight change.   HENT: Negative for congestion, ear pain, hearing loss, postnasal drip, rhinorrhea, sinus pressure, sinus pain, sneezing, sore throat and trouble swallowing.    Eyes: Negative for photophobia, pain, discharge and visual disturbance.   Respiratory: Negative for  "cough, chest tightness, shortness of breath and wheezing.    Cardiovascular: Negative for chest pain, palpitations and leg swelling.   Gastrointestinal: Negative for abdominal distention, abdominal pain, blood in stool, constipation, diarrhea, nausea and vomiting.   Endocrine: Negative for cold intolerance, heat intolerance, polydipsia and polyuria.   Genitourinary: Negative for difficulty urinating, dysuria, flank pain, frequency, hematuria, menstrual problem, pelvic pain and urgency.   Musculoskeletal: Negative for arthralgias, back pain, joint swelling, myalgias and neck pain.   Skin: Negative for pallor.   Allergic/Immunologic: Negative for environmental allergies and food allergies.   Neurological: Negative for dizziness, weakness, light-headedness, numbness and headaches.   Hematological: Does not bruise/bleed easily.   Psychiatric/Behavioral: Positive for decreased concentration. Negative for agitation, confusion, dysphoric mood and sleep disturbance. The patient is nervous/anxious.       OBJECTIVE:      Vitals:    07/07/22 0942   BP: 112/78   BP Location: Right arm   Patient Position: Sitting   BP Method: Large (Manual)   Pulse: 76   SpO2: 97%   Weight: 88.5 kg (195 lb)   Height: 5' 5" (1.651 m)     Physical Exam  Vitals and nursing note reviewed.   Constitutional:       General: She is not in acute distress.     Appearance: Normal appearance. She is well-developed and well-groomed. She is obese.   HENT:      Head: Normocephalic and atraumatic.      Right Ear: Hearing normal.      Left Ear: Hearing normal.      Nose: Nose normal. No rhinorrhea.   Eyes:      General: Lids are normal.         Right eye: No discharge.         Left eye: No discharge.      Conjunctiva/sclera: Conjunctivae normal.      Right eye: Right conjunctiva is not injected.      Left eye: Left conjunctiva is not injected.      Pupils: Pupils are equal, round, and reactive to light. Pupils are equal.      Right eye: Pupil is round and " reactive.      Left eye: Pupil is round and reactive.   Neck:      Thyroid: No thyromegaly.      Vascular: No JVD.      Trachea: Trachea normal. No tracheal deviation.   Cardiovascular:      Rate and Rhythm: Normal rate and regular rhythm.      Pulses:           Radial pulses are 2+ on the right side and 2+ on the left side.      Heart sounds: Normal heart sounds. No murmur heard.    No friction rub. No gallop.   Pulmonary:      Effort: Pulmonary effort is normal. No respiratory distress.      Breath sounds: Normal breath sounds. No stridor. No decreased breath sounds, wheezing, rhonchi or rales.   Abdominal:      General: Bowel sounds are normal. There is no distension.      Palpations: Abdomen is soft. Abdomen is not rigid.      Tenderness: There is no abdominal tenderness. There is no guarding.   Musculoskeletal:         General: Normal range of motion.      Cervical back: Normal range of motion and neck supple.   Lymphadenopathy:      Cervical: No cervical adenopathy.   Skin:     General: Skin is warm and dry.      Capillary Refill: Capillary refill takes less than 2 seconds.      Coloration: Skin is not pale.      Findings: No lesion or rash.   Neurological:      Mental Status: She is alert and oriented to person, place, and time.      Motor: No atrophy.      Coordination: Coordination normal.      Gait: Gait normal.   Psychiatric:         Attention and Perception: She is attentive.         Speech: Speech normal.         Behavior: Behavior normal.         Thought Content: Thought content normal.         Judgment: Judgment normal.        Assessment:       1. Routine health maintenance        Plan:       Routine health maintenance  -     CBC Auto Differential; Future; Expected date: 07/14/2022  -     Comprehensive Metabolic Panel; Future; Expected date: 07/07/2022  -     Lipid Panel; Future; Expected date: 07/07/2022  -     TSH; Future; Expected date: 07/07/2022  -     Hemoglobin A1C; Future; Expected date:  07/07/2022        - Limit: red meat, butter, fried foods, cheese, and other foods that have a lot of saturated fat. Consume more: lean meats, fish, fruits, vegetables, whole grains, beans, lentils, and nuts.        - Instructed on guidelines recommending 150 minutes per week of brisk exercise and healthy lifestyle. Recommended well screenings (including immunizations) reviewed with patient. Discussed outstanding health maintenance and rationale for completion.        - discussed anxiety & focus medication options, will follow-up through portal        - will call with results of lab work      Follow up in about 3 months (around 10/7/2022) for anxiety.      7/7/2022 AMALIA Gonzalez, FNP-C

## 2022-07-08 ENCOUNTER — LAB VISIT (OUTPATIENT)
Dept: LAB | Facility: HOSPITAL | Age: 32
End: 2022-07-08
Attending: NURSE PRACTITIONER
Payer: COMMERCIAL

## 2022-07-08 DIAGNOSIS — Z00.00 ROUTINE HEALTH MAINTENANCE: ICD-10-CM

## 2022-07-08 LAB
ALBUMIN SERPL BCP-MCNC: 4.2 G/DL (ref 3.5–5.2)
ALP SERPL-CCNC: 42 U/L (ref 55–135)
ALT SERPL W/O P-5'-P-CCNC: 16 U/L (ref 10–44)
ANION GAP SERPL CALC-SCNC: 9 MMOL/L (ref 8–16)
AST SERPL-CCNC: 17 U/L (ref 10–40)
BASOPHILS # BLD AUTO: 0.03 K/UL (ref 0–0.2)
BASOPHILS NFR BLD: 0.6 % (ref 0–1.9)
BILIRUB SERPL-MCNC: 0.9 MG/DL (ref 0.1–1)
BUN SERPL-MCNC: 7 MG/DL (ref 6–20)
CALCIUM SERPL-MCNC: 9.3 MG/DL (ref 8.7–10.5)
CHLORIDE SERPL-SCNC: 107 MMOL/L (ref 95–110)
CHOLEST SERPL-MCNC: 149 MG/DL (ref 120–199)
CHOLEST/HDLC SERPL: 3 {RATIO} (ref 2–5)
CO2 SERPL-SCNC: 22 MMOL/L (ref 23–29)
CREAT SERPL-MCNC: 0.8 MG/DL (ref 0.5–1.4)
DIFFERENTIAL METHOD: NORMAL
EOSINOPHIL # BLD AUTO: 0.2 K/UL (ref 0–0.5)
EOSINOPHIL NFR BLD: 3.1 % (ref 0–8)
ERYTHROCYTE [DISTWIDTH] IN BLOOD BY AUTOMATED COUNT: 13.3 % (ref 11.5–14.5)
EST. GFR  (AFRICAN AMERICAN): >60 ML/MIN/1.73 M^2
EST. GFR  (NON AFRICAN AMERICAN): >60 ML/MIN/1.73 M^2
GLUCOSE SERPL-MCNC: 86 MG/DL (ref 70–110)
HCT VFR BLD AUTO: 41.2 % (ref 37–48.5)
HDLC SERPL-MCNC: 49 MG/DL (ref 40–75)
HDLC SERPL: 32.9 % (ref 20–50)
HGB BLD-MCNC: 14 G/DL (ref 12–16)
IMM GRANULOCYTES # BLD AUTO: 0.01 K/UL (ref 0–0.04)
IMM GRANULOCYTES NFR BLD AUTO: 0.2 % (ref 0–0.5)
LDLC SERPL CALC-MCNC: 90.6 MG/DL (ref 63–159)
LYMPHOCYTES # BLD AUTO: 2.1 K/UL (ref 1–4.8)
LYMPHOCYTES NFR BLD: 43.8 % (ref 18–48)
MCH RBC QN AUTO: 29.7 PG (ref 27–31)
MCHC RBC AUTO-ENTMCNC: 34 G/DL (ref 32–36)
MCV RBC AUTO: 88 FL (ref 82–98)
MONOCYTES # BLD AUTO: 0.7 K/UL (ref 0.3–1)
MONOCYTES NFR BLD: 13.9 % (ref 4–15)
NEUTROPHILS # BLD AUTO: 1.9 K/UL (ref 1.8–7.7)
NEUTROPHILS NFR BLD: 38.4 % (ref 38–73)
NONHDLC SERPL-MCNC: 100 MG/DL
NRBC BLD-RTO: 0 /100 WBC
PLATELET # BLD AUTO: 410 K/UL (ref 150–450)
PMV BLD AUTO: 9.8 FL (ref 9.2–12.9)
POTASSIUM SERPL-SCNC: 3.7 MMOL/L (ref 3.5–5.1)
PROT SERPL-MCNC: 7.8 G/DL (ref 6–8.4)
RBC # BLD AUTO: 4.71 M/UL (ref 4–5.4)
SODIUM SERPL-SCNC: 138 MMOL/L (ref 136–145)
TRIGL SERPL-MCNC: 47 MG/DL (ref 30–150)
TSH SERPL DL<=0.005 MIU/L-ACNC: 1.47 UIU/ML (ref 0.34–5.6)
WBC # BLD AUTO: 4.82 K/UL (ref 3.9–12.7)

## 2022-07-08 PROCEDURE — 84443 ASSAY THYROID STIM HORMONE: CPT | Performed by: NURSE PRACTITIONER

## 2022-07-08 PROCEDURE — 85025 COMPLETE CBC W/AUTO DIFF WBC: CPT | Performed by: NURSE PRACTITIONER

## 2022-07-08 PROCEDURE — 36415 COLL VENOUS BLD VENIPUNCTURE: CPT | Performed by: NURSE PRACTITIONER

## 2022-07-08 PROCEDURE — 80061 LIPID PANEL: CPT | Performed by: NURSE PRACTITIONER

## 2022-07-08 PROCEDURE — 83036 HEMOGLOBIN GLYCOSYLATED A1C: CPT | Performed by: NURSE PRACTITIONER

## 2022-07-08 PROCEDURE — 80053 COMPREHEN METABOLIC PANEL: CPT | Performed by: NURSE PRACTITIONER

## 2022-07-09 LAB
ESTIMATED AVG GLUCOSE: 91 MG/DL (ref 68–131)
HBA1C MFR BLD: 4.8 % (ref 4.5–6.2)

## 2022-07-15 ENCOUNTER — PATIENT MESSAGE (OUTPATIENT)
Dept: FAMILY MEDICINE | Facility: CLINIC | Age: 32
End: 2022-07-15

## 2022-07-15 DIAGNOSIS — R41.840 ATTENTION AND CONCENTRATION DEFICIT: ICD-10-CM

## 2022-07-15 DIAGNOSIS — F41.9 ANXIETY: Primary | ICD-10-CM

## 2022-07-21 ENCOUNTER — PATIENT MESSAGE (OUTPATIENT)
Dept: FAMILY MEDICINE | Facility: CLINIC | Age: 32
End: 2022-07-21

## 2022-07-21 RX ORDER — DEXTROAMPHETAMINE SACCHARATE, AMPHETAMINE ASPARTATE MONOHYDRATE, DEXTROAMPHETAMINE SULFATE AND AMPHETAMINE SULFATE 5; 5; 5; 5 MG/1; MG/1; MG/1; MG/1
20 CAPSULE, EXTENDED RELEASE ORAL EVERY MORNING
Qty: 30 CAPSULE | Refills: 0 | Status: SHIPPED | OUTPATIENT
Start: 2022-07-21 | End: 2022-09-01 | Stop reason: DRUGHIGH

## 2022-07-21 RX ORDER — FLUOXETINE 10 MG/1
10 CAPSULE ORAL DAILY
Qty: 30 CAPSULE | Refills: 2 | Status: SHIPPED | OUTPATIENT
Start: 2022-07-21 | End: 2023-02-07 | Stop reason: SDUPTHER

## 2022-07-22 ENCOUNTER — TELEPHONE (OUTPATIENT)
Dept: FAMILY MEDICINE | Facility: CLINIC | Age: 32
End: 2022-07-22

## 2022-07-29 ENCOUNTER — PATIENT MESSAGE (OUTPATIENT)
Dept: FAMILY MEDICINE | Facility: CLINIC | Age: 32
End: 2022-07-29

## 2022-08-09 ENCOUNTER — PATIENT MESSAGE (OUTPATIENT)
Dept: FAMILY MEDICINE | Facility: CLINIC | Age: 32
End: 2022-08-09

## 2022-09-01 DIAGNOSIS — R41.840 ATTENTION AND CONCENTRATION DEFICIT: Primary | ICD-10-CM

## 2022-09-01 RX ORDER — DEXTROAMPHETAMINE SACCHARATE, AMPHETAMINE ASPARTATE MONOHYDRATE, DEXTROAMPHETAMINE SULFATE AND AMPHETAMINE SULFATE 2.5; 2.5; 2.5; 2.5 MG/1; MG/1; MG/1; MG/1
10 CAPSULE, EXTENDED RELEASE ORAL EVERY MORNING
Qty: 30 CAPSULE | Refills: 0 | Status: SHIPPED | OUTPATIENT
Start: 2022-09-01 | End: 2023-07-18 | Stop reason: ALTCHOICE

## 2022-09-06 ENCOUNTER — TELEPHONE (OUTPATIENT)
Dept: DERMATOLOGY | Facility: CLINIC | Age: 32
End: 2022-09-06
Payer: COMMERCIAL

## 2022-10-25 ENCOUNTER — PATIENT MESSAGE (OUTPATIENT)
Dept: FAMILY MEDICINE | Facility: CLINIC | Age: 32
End: 2022-10-25

## 2022-10-25 ENCOUNTER — HOSPITAL ENCOUNTER (OUTPATIENT)
Dept: RADIOLOGY | Facility: HOSPITAL | Age: 32
Discharge: HOME | End: 2022-10-25
Attending: NURSE PRACTITIONER
Payer: COMMERCIAL

## 2022-10-25 ENCOUNTER — HOSPITAL ENCOUNTER (EMERGENCY)
Facility: HOSPITAL | Age: 32
Discharge: HOME | End: 2022-10-26
Attending: EMERGENCY MEDICINE
Payer: COMMERCIAL

## 2022-10-25 DIAGNOSIS — R00.0 TACHYCARDIA: ICD-10-CM

## 2022-10-25 DIAGNOSIS — M79.10 MYALGIA: ICD-10-CM

## 2022-10-25 DIAGNOSIS — R50.9 FEVER OF UNKNOWN ORIGIN: ICD-10-CM

## 2022-10-25 DIAGNOSIS — J10.1 INFLUENZA A: Primary | ICD-10-CM

## 2022-10-25 DIAGNOSIS — R79.89 POSITIVE D DIMER: ICD-10-CM

## 2022-10-25 DIAGNOSIS — J20.9 ACUTE BRONCHITIS: ICD-10-CM

## 2022-10-25 LAB
ADENOVIRUS: NOT DETECTED
ALBUMIN SERPL BCP-MCNC: 4.3 G/DL (ref 3.5–5.2)
ALP SERPL-CCNC: 43 U/L (ref 55–135)
ALT SERPL W/O P-5'-P-CCNC: 15 U/L (ref 10–44)
ANION GAP SERPL CALC-SCNC: 7 MMOL/L (ref 8–16)
AST SERPL-CCNC: 19 U/L (ref 10–40)
B-HCG UR QL: NEGATIVE
BASOPHILS # BLD AUTO: 0.02 K/UL (ref 0–0.2)
BASOPHILS NFR BLD: 0.5 % (ref 0–1.9)
BILIRUB SERPL-MCNC: 0.5 MG/DL (ref 0.1–1)
BORDETELLA PARAPERTUSSIS (IS1001): NOT DETECTED
BORDETELLA PERTUSSIS (PTXP): NOT DETECTED
BUN SERPL-MCNC: 7 MG/DL (ref 6–20)
CALCIUM SERPL-MCNC: 9.3 MG/DL (ref 8.7–10.5)
CHLAMYDIA PNEUMONIAE: NOT DETECTED
CHLORIDE SERPL-SCNC: 108 MMOL/L (ref 95–110)
CO2 SERPL-SCNC: 20 MMOL/L (ref 23–29)
CORONAVIRUS 229E, COMMON COLD VIRUS: NOT DETECTED
CORONAVIRUS HKU1, COMMON COLD VIRUS: NOT DETECTED
CORONAVIRUS NL63, COMMON COLD VIRUS: NOT DETECTED
CORONAVIRUS OC43, COMMON COLD VIRUS: NOT DETECTED
CREAT SERPL-MCNC: 1 MG/DL (ref 0.5–1.4)
CTP QC/QA: YES
DIFFERENTIAL METHOD: ABNORMAL
EOSINOPHIL # BLD AUTO: 0 K/UL (ref 0–0.5)
EOSINOPHIL NFR BLD: 0.5 % (ref 0–8)
ERYTHROCYTE [DISTWIDTH] IN BLOOD BY AUTOMATED COUNT: 13.2 % (ref 11.5–14.5)
EST. GFR  (NO RACE VARIABLE): >60 ML/MIN/1.73 M^2
FLUBV RNA NPH QL NAA+NON-PROBE: NOT DETECTED
GLUCOSE SERPL-MCNC: 89 MG/DL (ref 70–110)
HCT VFR BLD AUTO: 41.6 % (ref 37–48.5)
HGB BLD-MCNC: 14.5 G/DL (ref 12–16)
HPIV1 RNA NPH QL NAA+NON-PROBE: NOT DETECTED
HPIV2 RNA NPH QL NAA+NON-PROBE: NOT DETECTED
HPIV3 RNA NPH QL NAA+NON-PROBE: NOT DETECTED
HPIV4 RNA NPH QL NAA+NON-PROBE: NOT DETECTED
HUMAN METAPNEUMOVIRUS: NOT DETECTED
IMM GRANULOCYTES # BLD AUTO: 0.01 K/UL (ref 0–0.04)
IMM GRANULOCYTES NFR BLD AUTO: 0.2 % (ref 0–0.5)
INFLUENZA A (SUBTYPES H1,H1-2009,H3): DETECTED
LACTATE SERPL-SCNC: 1.2 MMOL/L (ref 0.5–1.9)
LYMPHOCYTES # BLD AUTO: 0.9 K/UL (ref 1–4.8)
LYMPHOCYTES NFR BLD: 20.2 % (ref 18–48)
MCH RBC QN AUTO: 29.8 PG (ref 27–31)
MCHC RBC AUTO-ENTMCNC: 34.9 G/DL (ref 32–36)
MCV RBC AUTO: 85 FL (ref 82–98)
MONOCYTES # BLD AUTO: 0.7 K/UL (ref 0.3–1)
MONOCYTES NFR BLD: 17.1 % (ref 4–15)
MYCOPLASMA PNEUMONIAE: NOT DETECTED
NEUTROPHILS # BLD AUTO: 2.6 K/UL (ref 1.8–7.7)
NEUTROPHILS NFR BLD: 61.5 % (ref 38–73)
NRBC BLD-RTO: 0 /100 WBC
PLATELET # BLD AUTO: 296 K/UL (ref 150–450)
PMV BLD AUTO: 9.6 FL (ref 9.2–12.9)
POTASSIUM SERPL-SCNC: 3.4 MMOL/L (ref 3.5–5.1)
PROCALCITONIN SERPL IA-MCNC: <0.05 NG/ML (ref 0–0.5)
PROT SERPL-MCNC: 8.2 G/DL (ref 6–8.4)
RBC # BLD AUTO: 4.87 M/UL (ref 4–5.4)
RESPIRATORY INFECTION PANEL SOURCE: ABNORMAL
RSV RNA NPH QL NAA+NON-PROBE: NOT DETECTED
RV+EV RNA NPH QL NAA+NON-PROBE: NOT DETECTED
SARS-COV-2 RNA RESP QL NAA+PROBE: NOT DETECTED
SODIUM SERPL-SCNC: 135 MMOL/L (ref 136–145)
WBC # BLD AUTO: 4.26 K/UL (ref 3.9–12.7)

## 2022-10-25 PROCEDURE — 80053 COMPREHEN METABOLIC PANEL: CPT | Performed by: EMERGENCY MEDICINE

## 2022-10-25 PROCEDURE — 93010 EKG 12-LEAD: ICD-10-PCS | Mod: ,,, | Performed by: SPECIALIST

## 2022-10-25 PROCEDURE — 87798 DETECT AGENT NOS DNA AMP: CPT | Performed by: EMERGENCY MEDICINE

## 2022-10-25 PROCEDURE — 93010 ELECTROCARDIOGRAM REPORT: CPT | Mod: ,,, | Performed by: SPECIALIST

## 2022-10-25 PROCEDURE — 84145 PROCALCITONIN (PCT): CPT | Performed by: EMERGENCY MEDICINE

## 2022-10-25 PROCEDURE — 93005 ELECTROCARDIOGRAM TRACING: CPT | Performed by: SPECIALIST

## 2022-10-25 PROCEDURE — 81025 URINE PREGNANCY TEST: CPT | Performed by: EMERGENCY MEDICINE

## 2022-10-25 PROCEDURE — 25000003 PHARM REV CODE 250: Performed by: EMERGENCY MEDICINE

## 2022-10-25 PROCEDURE — 83605 ASSAY OF LACTIC ACID: CPT | Performed by: EMERGENCY MEDICINE

## 2022-10-25 PROCEDURE — 63600175 PHARM REV CODE 636 W HCPCS: Performed by: EMERGENCY MEDICINE

## 2022-10-25 PROCEDURE — 71275 CT ANGIOGRAPHY CHEST: CPT | Mod: TC,PO

## 2022-10-25 PROCEDURE — 99285 EMERGENCY DEPT VISIT HI MDM: CPT | Mod: 25

## 2022-10-25 PROCEDURE — 87040 BLOOD CULTURE FOR BACTERIA: CPT | Mod: 59 | Performed by: EMERGENCY MEDICINE

## 2022-10-25 PROCEDURE — 96360 HYDRATION IV INFUSION INIT: CPT

## 2022-10-25 PROCEDURE — 87798 DETECT AGENT NOS DNA AMP: CPT | Mod: 59 | Performed by: EMERGENCY MEDICINE

## 2022-10-25 PROCEDURE — 25500020 PHARM REV CODE 255: Mod: PO

## 2022-10-25 PROCEDURE — 85025 COMPLETE CBC W/AUTO DIFF WBC: CPT | Performed by: EMERGENCY MEDICINE

## 2022-10-25 RX ORDER — ACETAMINOPHEN 500 MG
1000 TABLET ORAL
Status: COMPLETED | OUTPATIENT
Start: 2022-10-25 | End: 2022-10-25

## 2022-10-25 RX ORDER — OSELTAMIVIR PHOSPHATE 75 MG/1
75 CAPSULE ORAL 2 TIMES DAILY
Qty: 10 CAPSULE | Refills: 0 | Status: SHIPPED | OUTPATIENT
Start: 2022-10-25 | End: 2022-10-30

## 2022-10-25 RX ADMIN — SODIUM CHLORIDE, SODIUM LACTATE, POTASSIUM CHLORIDE, AND CALCIUM CHLORIDE 1000 ML: .6; .31; .03; .02 INJECTION, SOLUTION INTRAVENOUS at 08:10

## 2022-10-25 RX ADMIN — IOHEXOL 100 ML: 350 INJECTION, SOLUTION INTRAVENOUS at 01:10

## 2022-10-25 RX ADMIN — ACETAMINOPHEN 1000 MG: 500 TABLET, FILM COATED ORAL at 08:10

## 2022-10-26 VITALS
BODY MASS INDEX: 30.56 KG/M2 | HEIGHT: 64 IN | RESPIRATION RATE: 18 BRPM | SYSTOLIC BLOOD PRESSURE: 120 MMHG | TEMPERATURE: 99 F | OXYGEN SATURATION: 99 % | WEIGHT: 179 LBS | HEART RATE: 89 BPM | DIASTOLIC BLOOD PRESSURE: 76 MMHG

## 2022-10-26 NOTE — ED PROVIDER NOTES
Encounter Date: 10/25/2022       History     Chief Complaint   Patient presents with    Abnormal Labs     Pt had elevated D-Dimer. Pt states she was diagnosed with pneumonia today and is having lower back pain upon inspiration. Negative for Covid/Flu     32-year-old female with history of anxiety, migraine headache, sickle cell trait.  Patient seen at [facility] earlier today with complaint of cough, fever, URI symptoms.  Patient diagnosed with lobar pneumonia.  Patient told to come to the emergency department secondary to elevated D-dimer.  Patient states had outpatient CT a chest which showed no evidence of large vessel embolism., CT furthermore denies show evidence of pneumonia as well.  Patient states was told by her primary care provider to come to the emergency department for further evaluation.  She denies no pleuritic chest pain, no previous history of pulmonary embolism, currently is not on birth control pills.  Patient does endorse having fever, arthralgia in URI symptoms however.    Review of patient's allergies indicates:  No Known Allergies  Past Medical History:   Diagnosis Date    Anxiety     Chest pain     Herpes simplex without mention of complication     History of Amplatzer atrial septal defect closure 2016    History of atrial septal defect 2016    Migraine     PONV (postoperative nausea and vomiting)     Sickle cell trait     Vaso vagal episode     Vasovagal syncope     last      Past Surgical History:   Procedure Laterality Date    CARDIAC CATHETERIZATION  2006    ASD closure     SECTION  ,,    COLONOSCOPY  2014    HYSTEROSCOPY WITH DILATION AND CURETTAGE OF UTERUS N/A 2020    Procedure: HYSTEROSCOPY, WITH DILATION AND CURETTAGE OF UTERUS;  Surgeon: Julissa Munoz MD;  Location: [facility];  Service: OB/GYN;  Laterality: N/A;    THERMAL ABLATION OF ENDOMETRIUM USING HYSTEROSCOPY N/A 2020    Procedure: ABLATION, ENDOMETRIUM, THERMAL,  HYSTEROSCOPIC - Novasure;  Surgeon: Julissa Munoz MD;  Location: [facility];  Service: OB/GYN;  Laterality: N/A;    TUBAL LIGATION  2016     Family History   Problem Relation Age of Onset    Arthritis Father     Hypertension Father     Stroke Father     Heart disease Father     No Known Problems Mother     Asthma Sister     Asthma Daughter     Cancer Paternal Grandmother         breast and bone cancer    Cancer Paternal Grandfather         Colon cancer    Diabetes Paternal Grandfather     No Known Problems Daughter     No Known Problems Daughter      Social History     Tobacco Use    Smoking status: Never    Smokeless tobacco: Never   Substance Use Topics    Alcohol use: No    Drug use: No     Review of Systems   Constitutional:  Positive for fever.   HENT:  Positive for congestion. Negative for sore throat.    Respiratory:  Positive for cough. Negative for chest tightness, shortness of breath and wheezing.    Cardiovascular:  Negative for chest pain.   Gastrointestinal:  Negative for nausea.   Genitourinary:  Negative for dysuria.   Musculoskeletal:  Positive for arthralgias and myalgias. Negative for back pain.   Skin:  Negative for rash.   Neurological:  Negative for weakness.   Hematological:  Does not bruise/bleed easily.     Physical Exam     Initial Vitals [10/25/22 1943]   BP Pulse Resp Temp SpO2   119/80 101 18 (!) 101 °F (38.3 °C) 96 %      MAP       --         Physical Exam    Nursing note and vitals reviewed.  Constitutional: She appears well-developed and well-nourished.   HENT:   Head: Normocephalic and atraumatic.   Nose: Nose normal.   Mouth/Throat: Oropharynx is clear and moist.   Eyes: Conjunctivae and EOM are normal. Pupils are equal, round, and reactive to light.   Neck: Neck supple. No thyromegaly present. No tracheal deviation present.   Normal range of motion.  Cardiovascular:  Normal rate, regular rhythm, normal heart sounds and intact distal pulses.     Exam reveals no gallop and no  friction rub.       No murmur heard.  Pulmonary/Chest: No stridor. No respiratory distress. She has no rhonchi. She has no rales.   Course bilateral breath sounds   Abdominal: Abdomen is soft. Bowel sounds are normal. She exhibits no mass. There is no rebound and no guarding.   Musculoskeletal:         General: No edema. Normal range of motion.      Cervical back: Normal range of motion and neck supple.     Lymphadenopathy:     She has no cervical adenopathy.   Neurological: She is alert and oriented to person, place, and time. She has normal strength and normal reflexes. She displays normal reflexes. No cranial nerve deficit or sensory deficit. GCS score is 15. GCS eye subscore is 4. GCS verbal subscore is 5. GCS motor subscore is 6.   Skin: Skin is warm and dry. Capillary refill takes less than 2 seconds.   Psychiatric: She has a normal mood and affect.       ED Course   Procedures  Labs Reviewed   RESPIRATORY INFECTION PANEL (PCR), NASOPHARYNGEAL - Abnormal; Notable for the following components:       Result Value    Influenza A (subtypes H1, H1-2009,H3) Detected (*)     All other components within normal limits    Narrative:     Specimen Source->Nasopharyngeal Swab   CBC W/ AUTO DIFFERENTIAL - Abnormal; Notable for the following components:    Lymph # 0.9 (*)     Mono % 17.1 (*)     All other components within normal limits   COMPREHENSIVE METABOLIC PANEL - Abnormal; Notable for the following components:    Sodium 135 (*)     Potassium 3.4 (*)     CO2 20 (*)     Alkaline Phosphatase 43 (*)     Anion Gap 7 (*)     All other components within normal limits   CULTURE, BLOOD   CULTURE, BLOOD   LACTIC ACID, PLASMA   PROCALCITONIN   URINALYSIS, REFLEX TO URINE CULTURE   POCT URINE PREGNANCY          Imaging Results              US Lower Extremity Veins Bilateral (In process)                      Medications   iohexoL (OMNIPAQUE 350) injection 75 mL (has no administration in time range)   acetaminophen tablet 1,000 mg  (1,000 mg Oral Given 10/25/22 2015)   lactated ringers bolus 1,000 mL (0 mLs Intravenous Stopped 10/25/22 2116)     Medical Decision Making:   Initial Assessment:   32-year-old female with history of anxiety, migraine headache, sickle cell trait.  Patient seen at [facility] earlier today with complaint of cough, fever, URI symptoms.  Patient diagnosed with lobar pneumonia.  Patient told to come to the emergency department secondary to elevated D-dimer.  Patient states had outpatient CT a chest which showed no evidence of large vessel embolism., CT furthermore denies show evidence of pneumonia as well.  Patient states was told by her primary care provider to come to the emergency department for further evaluation.  She denies no pleuritic chest pain, no previous history of pulmonary embolism, currently is not on birth control pills.  Patient does endorse having fever, arthralgia in URI symptoms however.    Differential Diagnosis:   Viral syndrome, pneumonia,   Clinical Tests:   Lab Tests: Ordered and Reviewed  Radiological Study: Ordered and Reviewed  ED Management:  Patient seen evaluated emergency department.  Patient referred secondary to positive D-dimer.  Currently at this time patient did receive the CT a earlier which showed no evidence of pulmonary embolism.  Patient had viral testing which was negative for influenza however repeat tested here in emergency department tonight showed influenza A positive.  Did undergo ultrasound bilateral lower extremities which showed normal ultrasound no DVT.  D-dimer most likely elevated secondary to viral syndrome.  Patient will be treated for influenza A she is to follow with primary care provider this week.                        Clinical Impression:   Final diagnoses:  [R00.0] Tachycardia  [R79.89] Positive D dimer  [J10.1] Influenza A (Primary)             Dillan Arguello MD  10/25/22 7528

## 2022-10-30 LAB — BACTERIA BLD CULT: NORMAL

## 2022-10-31 LAB — BACTERIA BLD CULT: NORMAL

## 2022-11-06 ENCOUNTER — PATIENT MESSAGE (OUTPATIENT)
Dept: FAMILY MEDICINE | Facility: CLINIC | Age: 32
End: 2022-11-06

## 2022-11-18 ENCOUNTER — PATIENT MESSAGE (OUTPATIENT)
Dept: FAMILY MEDICINE | Facility: CLINIC | Age: 32
End: 2022-11-18

## 2022-12-01 ENCOUNTER — PATIENT MESSAGE (OUTPATIENT)
Dept: FAMILY MEDICINE | Facility: CLINIC | Age: 32
End: 2022-12-01

## 2022-12-08 ENCOUNTER — OFFICE VISIT (OUTPATIENT)
Dept: DERMATOLOGY | Facility: CLINIC | Age: 32
End: 2022-12-08
Payer: COMMERCIAL

## 2022-12-08 DIAGNOSIS — L65.9 ALOPECIA: ICD-10-CM

## 2022-12-08 PROCEDURE — 1160F RVW MEDS BY RX/DR IN RCRD: CPT | Mod: CPTII,S$GLB,, | Performed by: STUDENT IN AN ORGANIZED HEALTH CARE EDUCATION/TRAINING PROGRAM

## 2022-12-08 PROCEDURE — 88305 TISSUE EXAM BY PATHOLOGIST: CPT | Performed by: DERMATOLOGY

## 2022-12-08 PROCEDURE — 1160F PR REVIEW ALL MEDS BY PRESCRIBER/CLIN PHARMACIST DOCUMENTED: ICD-10-PCS | Mod: CPTII,S$GLB,, | Performed by: STUDENT IN AN ORGANIZED HEALTH CARE EDUCATION/TRAINING PROGRAM

## 2022-12-08 PROCEDURE — 88312 SPECIAL STAINS GROUP 1: CPT | Mod: 26,,, | Performed by: DERMATOLOGY

## 2022-12-08 PROCEDURE — 99999 PR PBB SHADOW E&M-EST. PATIENT-LVL III: ICD-10-PCS | Mod: PBBFAC,,, | Performed by: STUDENT IN AN ORGANIZED HEALTH CARE EDUCATION/TRAINING PROGRAM

## 2022-12-08 PROCEDURE — 1159F PR MEDICATION LIST DOCUMENTED IN MEDICAL RECORD: ICD-10-PCS | Mod: CPTII,S$GLB,, | Performed by: STUDENT IN AN ORGANIZED HEALTH CARE EDUCATION/TRAINING PROGRAM

## 2022-12-08 PROCEDURE — 88313 PR  SPECIAL STAINS,GROUP II: ICD-10-PCS | Mod: 26,,, | Performed by: DERMATOLOGY

## 2022-12-08 PROCEDURE — 99204 PR OFFICE/OUTPT VISIT, NEW, LEVL IV, 45-59 MIN: ICD-10-PCS | Mod: 25,S$GLB,, | Performed by: STUDENT IN AN ORGANIZED HEALTH CARE EDUCATION/TRAINING PROGRAM

## 2022-12-08 PROCEDURE — 1159F MED LIST DOCD IN RCRD: CPT | Mod: CPTII,S$GLB,, | Performed by: STUDENT IN AN ORGANIZED HEALTH CARE EDUCATION/TRAINING PROGRAM

## 2022-12-08 PROCEDURE — 11104 PR PUNCH BIOPSY, SKIN, SINGLE LESION: ICD-10-PCS | Mod: S$GLB,,, | Performed by: STUDENT IN AN ORGANIZED HEALTH CARE EDUCATION/TRAINING PROGRAM

## 2022-12-08 PROCEDURE — 88305 TISSUE EXAM BY PATHOLOGIST: CPT | Mod: 26,,, | Performed by: DERMATOLOGY

## 2022-12-08 PROCEDURE — 3044F HG A1C LEVEL LT 7.0%: CPT | Mod: CPTII,S$GLB,, | Performed by: STUDENT IN AN ORGANIZED HEALTH CARE EDUCATION/TRAINING PROGRAM

## 2022-12-08 PROCEDURE — 11104 PUNCH BX SKIN SINGLE LESION: CPT | Mod: S$GLB,,, | Performed by: STUDENT IN AN ORGANIZED HEALTH CARE EDUCATION/TRAINING PROGRAM

## 2022-12-08 PROCEDURE — 88313 SPECIAL STAINS GROUP 2: CPT | Performed by: DERMATOLOGY

## 2022-12-08 PROCEDURE — 99999 PR PBB SHADOW E&M-EST. PATIENT-LVL III: CPT | Mod: PBBFAC,,, | Performed by: STUDENT IN AN ORGANIZED HEALTH CARE EDUCATION/TRAINING PROGRAM

## 2022-12-08 PROCEDURE — 99204 OFFICE O/P NEW MOD 45 MIN: CPT | Mod: 25,S$GLB,, | Performed by: STUDENT IN AN ORGANIZED HEALTH CARE EDUCATION/TRAINING PROGRAM

## 2022-12-08 PROCEDURE — 88313 SPECIAL STAINS GROUP 2: CPT | Mod: 26,,, | Performed by: DERMATOLOGY

## 2022-12-08 PROCEDURE — 3044F PR MOST RECENT HEMOGLOBIN A1C LEVEL <7.0%: ICD-10-PCS | Mod: CPTII,S$GLB,, | Performed by: STUDENT IN AN ORGANIZED HEALTH CARE EDUCATION/TRAINING PROGRAM

## 2022-12-08 PROCEDURE — 88312 PR  SPECIAL STAINS,GROUP I: ICD-10-PCS | Mod: 26,,, | Performed by: DERMATOLOGY

## 2022-12-08 PROCEDURE — 88312 SPECIAL STAINS GROUP 1: CPT | Performed by: DERMATOLOGY

## 2022-12-08 PROCEDURE — 88305 TISSUE EXAM BY PATHOLOGIST: ICD-10-PCS | Mod: 26,,, | Performed by: DERMATOLOGY

## 2022-12-08 NOTE — PROGRESS NOTES
"  Subjective:       Patient ID:  Arnie Browne is a 32 y.o. female who presents for   Chief Complaint   Patient presents with    Hair Loss     New patient    Patient here today for hair loss since childhood. She recalls that it started when she was around 6 years ago.   Does not recall tight hair styles but always had her hair pulled up, did not wearing it down. Thinning hair on sides and front. Denies itching. Reports" father says she has dealt with this since a kid even received injection to head years ago". Previously treated with topical Rogaine but no improvement.    Sister with hair loss that has improved with rogaine.   Dad without hair loss. Unsure of maternal hx.       Current Outpatient Medications:   ·  ALPRAZolam (XANAX) 0.5 MG tablet, Take 1 tablet (0.5 mg total) by mouth 2 (two) times daily as needed for Anxiety., Disp: 60 tablet, Rfl: 0  ·  dextroamphetamine-amphetamine (ADDERALL XR) 10 MG 24 hr capsule, Take 1 capsule (10 mg total) by mouth every morning., Disp: 30 capsule, Rfl: 0  ·  FLUoxetine 10 MG capsule, Take 1 capsule (10 mg total) by mouth once daily., Disp: 30 capsule, Rfl: 2  ·  folic acid (FOLVITE) 1 MG tablet, 1 tablet, Disp: , Rfl:   ·  HIBICLENS 4 % external liquid, , Disp: , Rfl:   ·  ibuprofen (ADVIL,MOTRIN) 600 MG tablet, ibuprofen 600 mg tablet, Disp: , Rfl:   ·  mupirocin (BACTROBAN) 2 % ointment, , Disp: , Rfl:   ·  valACYclovir (VALTREX) 500 MG tablet, Take 500 mg by mouth as needed. DO NOT TAKE AM OF SURGERY, Disp: , Rfl:       Review of Systems   Constitutional:  Negative for fever and chills.   Respiratory:  Negative for cough and shortness of breath.    Gastrointestinal:  Negative for nausea and vomiting.      Objective:    Physical Exam   Constitutional: She appears well-developed and well-nourished.   Neurological: She is alert and oriented to person, place, and time.   Psychiatric: She has a normal mood and affect.   Skin:   Areas Examined (abnormalities noted in " diagram):   Scalp / Hair Palpated and Inspected            Diagram Legend     Erythematous scaling macule/papule c/w actinic keratosis       Vascular papule c/w angioma      Pigmented verrucoid papule/plaque c/w seborrheic keratosis      Yellow umbilicated papule c/w sebaceous hyperplasia      Irregularly shaped tan macule c/w lentigo     1-2 mm smooth white papules consistent with Milia      Movable subcutaneous cyst with punctum c/w epidermal inclusion cyst      Subcutaneous movable cyst c/w pilar cyst      Firm pink to brown papule c/w dermatofibroma      Pedunculated fleshy papule(s) c/w skin tag(s)      Evenly pigmented macule c/w junctional nevus     Mildly variegated pigmented, slightly irregular-bordered macule c/w mildly atypical nevus      Flesh colored to evenly pigmented papule c/w intradermal nevus       Pink pearly papule/plaque c/w basal cell carcinoma      Erythematous hyperkeratotic cursted plaque c/w SCC      Surgical scar with no sign of skin cancer recurrence      Open and closed comedones      Inflammatory papules and pustules      Verrucoid papule consistent consistent with wart     Erythematous eczematous patches and plaques     Dystrophic onycholytic nail with subungual debris c/w onychomycosis     Umbilicated papule    Erythematous-base heme-crusted tan verrucoid plaque consistent with inflamed seborrheic keratosis     Erythematous Silvery Scaling Plaque c/w Psoriasis     See annotation                        Assessment / Plan:      Pathology Orders:       Normal Orders This Visit    Specimen to Pathology, Dermatology     Comments:    Number of Specimens:->1  ------------------------->-------------------------  Spec 1 Procedure:->Biopsy  Spec 1 Clinical Impression:->ALOPECIA PLEASE ORIENT 1  VERTICAL AND 1 HORIZONTAL band like loss of hair on frontal  scalp since childhood dx: scarring alopecia such as end stage  tractionor LPP vs. non scarring  Spec 1 Source:->scalp    Questions:     Procedure Type: Dermatology and skin neoplasms    Number of Specimens: 1    ------------------------: -------------------------    Spec 1 Procedure: Biopsy    Spec 1 Clinical Impression: ALOPECIA PLEASE ORIENT 1 VERTICAL AND 1 HORIZONTAL band like loss of hair on frontal scalp since childhood dx: scarring alopecia such as end stage tractionor LPP vs. non scarring    Spec 1 Source: scalp    Release to patient:           Alopecia  -     Ambulatory referral/consult to Dermatology  -     Specimen to Pathology, Dermatology  -long standing  - dx: end stage scarring process from traction vs. Other scarring alopecia vs. Androgenic alopecia vs other  - reviewed labs 10/2022- CBC and CMP grossly WNL  - TSH from 7/2022 WNL  - unable to order vitamin D at this time  - consider LDOM pending results  Punch biopsy procedure note:  Punch biopsy performed after verbal consent obtained. Area marked and prepped with alcohol. Approximately 1cc of 1% lidocaine with epinephrine injected. 4 mm disposable punch used to remove lesion. Hemostasis obtained and biopsy site closed with 1 - 2 Prolene sutures. Wound care instructions reviewed with patient and handout given.             No follow-ups on file.

## 2022-12-08 NOTE — PATIENT INSTRUCTIONS
Punch Biopsy Wound Care    Your doctor has performed a punch biopsy today.  A band aid and antibiotic ointment has been placed over the site.  This should remain in place for 24 hours.  It is recommended that you keep the area dry for the first 24 hours.  After 24 hours, you may remove the band aid and wash the area with warm soap and water and apply Vaseline jelly.  Many patients prefer to use Neosporin or Bacitracin ointment.  This is acceptable; however know that you can develop an allergy to this medication even if you have used it safely for years.  It is important to keep the area moist.  Letting it dry out and get air slows healing time, will worsen the scar, and make it more difficult to remove the stitches if they were placed.  Band aid is optional after first 24 hours.      If you notice increasing redness, tenderness, pain, or yellow drainage at the biopsy or surgical site, please notify your doctor.  These are signs of an infection.    If your biopsy/surgical site is bleeding, apply firm pressure for 15 minutes straight.  Repeat for another 15 minutes, if it is still bleeding.   If the surgical site continues to bleed, then please contact your doctor.      [address] [place] [place] [address]/ [phone] FAX/ [email or web address]

## 2022-12-22 ENCOUNTER — CLINICAL SUPPORT (OUTPATIENT)
Dept: DERMATOLOGY | Facility: CLINIC | Age: 32
End: 2022-12-22
Payer: COMMERCIAL

## 2022-12-22 DIAGNOSIS — L65.9 ALOPECIA: Primary | ICD-10-CM

## 2022-12-22 LAB
FINAL PATHOLOGIC DIAGNOSIS: NORMAL
GROSS: NORMAL
Lab: NORMAL
MICROSCOPIC EXAM: NORMAL

## 2022-12-22 PROCEDURE — 99024 PR POST-OP FOLLOW-UP VISIT: ICD-10-PCS | Mod: S$GLB,,, | Performed by: STUDENT IN AN ORGANIZED HEALTH CARE EDUCATION/TRAINING PROGRAM

## 2022-12-22 PROCEDURE — 99024 POSTOP FOLLOW-UP VISIT: CPT | Mod: S$GLB,,, | Performed by: STUDENT IN AN ORGANIZED HEALTH CARE EDUCATION/TRAINING PROGRAM

## 2022-12-28 DIAGNOSIS — L65.9 ALOPECIA: Primary | ICD-10-CM

## 2022-12-28 RX ORDER — FLUOCINONIDE TOPICAL SOLUTION USP, 0.05% 0.5 MG/ML
SOLUTION TOPICAL 2 TIMES DAILY
Qty: 60 ML | Refills: 1 | Status: SHIPPED | OUTPATIENT
Start: 2022-12-28 | End: 2023-06-26 | Stop reason: SDUPTHER

## 2023-02-02 ENCOUNTER — TELEPHONE (OUTPATIENT)
Dept: DERMATOLOGY | Facility: CLINIC | Age: 33
End: 2023-02-02
Payer: COMMERCIAL

## 2023-02-02 NOTE — TELEPHONE ENCOUNTER
----- Message from Yury Zavala Patient Care Assistant sent at 2/2/2023 10:50 AM CST -----  Contact: Pt  Type: Return Call    Who Called: Pt  Who Left Message for Pt: Katy  Does the patient know what this is regarding: Yes/Scheduling  Best Call Back Number: [phone]  Thank you~

## 2023-02-07 ENCOUNTER — OFFICE VISIT (OUTPATIENT)
Dept: DERMATOLOGY | Facility: CLINIC | Age: 33
End: 2023-02-07
Payer: COMMERCIAL

## 2023-02-07 ENCOUNTER — OFFICE VISIT (OUTPATIENT)
Dept: FAMILY MEDICINE | Facility: CLINIC | Age: 33
End: 2023-02-07
Payer: COMMERCIAL

## 2023-02-07 DIAGNOSIS — F41.9 ANXIETY: Primary | ICD-10-CM

## 2023-02-07 DIAGNOSIS — L66.9 CICATRICIAL ALOPECIA: ICD-10-CM

## 2023-02-07 DIAGNOSIS — R41.840 ATTENTION AND CONCENTRATION DEFICIT: ICD-10-CM

## 2023-02-07 DIAGNOSIS — L64.9 ANDROGENIC ALOPECIA: Primary | ICD-10-CM

## 2023-02-07 PROCEDURE — 1159F MED LIST DOCD IN RCRD: CPT | Mod: CPTII,95,, | Performed by: NURSE PRACTITIONER

## 2023-02-07 PROCEDURE — 1160F PR REVIEW ALL MEDS BY PRESCRIBER/CLIN PHARMACIST DOCUMENTED: ICD-10-PCS | Mod: CPTII,95,, | Performed by: NURSE PRACTITIONER

## 2023-02-07 PROCEDURE — 99214 OFFICE O/P EST MOD 30 MIN: CPT | Mod: S$GLB,,, | Performed by: STUDENT IN AN ORGANIZED HEALTH CARE EDUCATION/TRAINING PROGRAM

## 2023-02-07 PROCEDURE — 1160F RVW MEDS BY RX/DR IN RCRD: CPT | Mod: CPTII,95,, | Performed by: NURSE PRACTITIONER

## 2023-02-07 PROCEDURE — 99999 PR PBB SHADOW E&M-EST. PATIENT-LVL II: CPT | Mod: PBBFAC,,, | Performed by: STUDENT IN AN ORGANIZED HEALTH CARE EDUCATION/TRAINING PROGRAM

## 2023-02-07 PROCEDURE — 99214 PR OFFICE/OUTPT VISIT, EST, LEVL IV, 30-39 MIN: ICD-10-PCS | Mod: S$GLB,,, | Performed by: STUDENT IN AN ORGANIZED HEALTH CARE EDUCATION/TRAINING PROGRAM

## 2023-02-07 PROCEDURE — 99999 PR PBB SHADOW E&M-EST. PATIENT-LVL II: ICD-10-PCS | Mod: PBBFAC,,, | Performed by: STUDENT IN AN ORGANIZED HEALTH CARE EDUCATION/TRAINING PROGRAM

## 2023-02-07 PROCEDURE — 99214 OFFICE O/P EST MOD 30 MIN: CPT | Mod: 95,,, | Performed by: NURSE PRACTITIONER

## 2023-02-07 PROCEDURE — 99214 PR OFFICE/OUTPT VISIT, EST, LEVL IV, 30-39 MIN: ICD-10-PCS | Mod: 95,,, | Performed by: NURSE PRACTITIONER

## 2023-02-07 PROCEDURE — 1159F PR MEDICATION LIST DOCUMENTED IN MEDICAL RECORD: ICD-10-PCS | Mod: CPTII,95,, | Performed by: NURSE PRACTITIONER

## 2023-02-07 RX ORDER — FINASTERIDE 5 MG/1
5 TABLET, FILM COATED ORAL DAILY
Qty: 30 TABLET | Refills: 11 | Status: SHIPPED | OUTPATIENT
Start: 2023-02-07 | End: 2023-10-09 | Stop reason: SDUPTHER

## 2023-02-07 NOTE — PROGRESS NOTES
Subjective:       Patient ID:  Arnie Browne is a 32 y.o. female who presents for   Chief Complaint   Patient presents with    Follow-up     Bx results     LOV 12/8/22    Patient here to discuss biopsy results. Using Lidex solution BID.    Final Pathologic Diagnosis Skin, scalp, biopsy:   -Cicatricial Alopecia, see note   Note: Given the clinical differential,  cicatricial alopecia seems most   appropriate in this context.  The findings are nonspecfic. There are some   viable hair follicles present;  however the majority are  minaturized with an   increased amount of sebaceous glands. This represents scarring and androgenic   alopecia both occurring concurrently.     Not planning for pregnancy, had ablation and partial hysterectomy    Review of Systems   Constitutional:  Negative for fever and chills.   Respiratory:  Negative for cough and shortness of breath.    Gastrointestinal:  Negative for nausea and vomiting.      Objective:    Physical Exam   Constitutional: She appears well-developed and well-nourished.   Neurological: She is alert and oriented to person, place, and time.   Psychiatric: She has a normal mood and affect.   Skin:   Areas Examined (abnormalities noted in diagram):   Scalp / Hair Palpated and Inspected            Diagram Legend     Erythematous scaling macule/papule c/w actinic keratosis       Vascular papule c/w angioma      Pigmented verrucoid papule/plaque c/w seborrheic keratosis      Yellow umbilicated papule c/w sebaceous hyperplasia      Irregularly shaped tan macule c/w lentigo     1-2 mm smooth white papules consistent with Milia      Movable subcutaneous cyst with punctum c/w epidermal inclusion cyst      Subcutaneous movable cyst c/w pilar cyst      Firm pink to brown papule c/w dermatofibroma      Pedunculated fleshy papule(s) c/w skin tag(s)      Evenly pigmented macule c/w junctional nevus     Mildly variegated pigmented, slightly irregular-bordered macule c/w mildly atypical  nevus      Flesh colored to evenly pigmented papule c/w intradermal nevus       Pink pearly papule/plaque c/w basal cell carcinoma      Erythematous hyperkeratotic cursted plaque c/w SCC      Surgical scar with no sign of skin cancer recurrence      Open and closed comedones      Inflammatory papules and pustules      Verrucoid papule consistent consistent with wart     Erythematous eczematous patches and plaques     Dystrophic onycholytic nail with subungual debris c/w onychomycosis     Umbilicated papule    Erythematous-base heme-crusted tan verrucoid plaque consistent with inflamed seborrheic keratosis     Erythematous Silvery Scaling Plaque c/w Psoriasis     See annotation                        Assessment / Plan:        Androgenic alopecia  Cicatricial alopecia  -     finasteride (PROSCAR) 5 mg tablet; Take 1 tablet (5 mg total) by mouth once daily.  Dispense: 30 tablet; Refill: 11  Suspect component of traction and androgenic alopecia  Spironolactone vs. Finasteride vs minoxidil  - start topical rogaine 5% foam once daily + finasteride 5mg daily  - lidex solution 1-2 x a day  Consider adding low dose PO minoxidil     F/u in 6 months    No follow-ups on file.

## 2023-02-08 RX ORDER — LISDEXAMFETAMINE DIMESYLATE CAPSULES 20 MG/1
20 CAPSULE ORAL EVERY MORNING
Qty: 30 CAPSULE | Refills: 0 | Status: SHIPPED | OUTPATIENT
Start: 2023-02-08 | End: 2023-03-20 | Stop reason: SDUPTHER

## 2023-02-08 RX ORDER — ALPRAZOLAM 0.5 MG/1
0.5 TABLET ORAL 2 TIMES DAILY PRN
Qty: 60 TABLET | Refills: 2 | Status: SHIPPED | OUTPATIENT
Start: 2023-02-08 | End: 2023-07-18 | Stop reason: SDUPTHER

## 2023-02-08 RX ORDER — FLUOXETINE HYDROCHLORIDE 20 MG/1
20 CAPSULE ORAL DAILY
Qty: 90 CAPSULE | Refills: 1 | Status: SHIPPED | OUTPATIENT
Start: 2023-02-08 | End: 2023-07-18 | Stop reason: SDUPTHER

## 2023-02-08 NOTE — PROGRESS NOTES
Subjective:        The chief complaint leading to consultation is: anxiety  The patient location is:  Work  Visit type: Virtual visit with synchronous audio/video or audio only  This was a video visit in lieu of in-person visit due to the coronavirus emergency. Patient acknowledged and consented to the video visit encounter.     Presents via telemed for ongoing anxiety & focus issues    Anxiety  Presents for follow-up visit. Symptoms include decreased concentration, depressed mood, excessive worry, insomnia, nervous/anxious behavior, panic, restlessness and shortness of breath. Patient reports no chest pain, confusion, dizziness, nausea or palpitations. Primary symptoms comment: attention & concentration issues. Symptoms occur constantly. The severity of symptoms is moderate. The quality of sleep is good.     Compliance with medications is %.   Neurologic Problem  The patient's pertinent negatives include no syncope or weakness. Primary symptoms comment: attention & concentration issues. This is a recurrent problem. The current episode started more than 1 month ago. The neurological problem developed insidiously. The problem has been waxing and waning since onset. There was no focality noted. Associated symptoms include headaches and shortness of breath. Pertinent negatives include no abdominal pain, back pain, chest pain, confusion, dizziness, fatigue, light-headedness, nausea, neck pain, palpitations or vomiting. Treatments tried: Adderall. The treatment provided significant relief. There is no history of a CVA, head trauma or mood changes.     Past Surgical History:   Procedure Laterality Date    CARDIAC CATHETERIZATION  2006    ASD closure     SECTION  ,2016,    COLONOSCOPY  2014    HYSTEROSCOPY WITH DILATION AND CURETTAGE OF UTERUS N/A 2020    Procedure: HYSTEROSCOPY, WITH DILATION AND CURETTAGE OF UTERUS;  Surgeon: Julissa Munoz MD;  Location: [facility] OR;  Service: OB/GYN;   Laterality: N/A;    THERMAL ABLATION OF ENDOMETRIUM USING HYSTEROSCOPY N/A 2/28/2020    Procedure: ABLATION, ENDOMETRIUM, THERMAL, HYSTEROSCOPIC - Novasure;  Surgeon: Julissa Munoz MD;  Location: [facility] OR;  Service: OB/GYN;  Laterality: N/A;    TUBAL LIGATION  2016     Past Medical History:   Diagnosis Date    Anxiety     Chest pain     Herpes simplex without mention of complication     History of Amplatzer atrial septal defect closure 1/13/2016 2006    History of atrial septal defect 1/13/2016    Migraine     PONV (postoperative nausea and vomiting)     Sickle cell trait     Vaso vagal episode     Vasovagal syncope     last 2019     Family History   Problem Relation Age of Onset    Arthritis Father     Hypertension Father     Stroke Father     Heart disease Father     No Known Problems Mother     Asthma Sister     Asthma Daughter     Cancer Paternal Grandmother         breast and bone cancer    Cancer Paternal Grandfather         Colon cancer    Diabetes Paternal Grandfather     No Known Problems Daughter     No Known Problems Daughter         Social History:   Marital Status:   Alcohol History:  reports no history of alcohol use.  Tobacco History:  reports that she has never smoked. She has never used smokeless tobacco.  Drug History:  reports no history of drug use.    Review of patient's allergies indicates:  No Known Allergies    Current Outpatient Medications   Medication Sig Dispense Refill    ALPRAZolam (XANAX) 0.5 MG tablet Take 1 tablet (0.5 mg total) by mouth 2 (two) times daily as needed for Anxiety. 60 tablet 2    dextroamphetamine-amphetamine (ADDERALL XR) 10 MG 24 hr capsule Take 1 capsule (10 mg total) by mouth every morning. 30 capsule 0    finasteride (PROSCAR) 5 mg tablet Take 1 tablet (5 mg total) by mouth once daily. 30 tablet 11    fluocinonide (LIDEX) 0.05 % external solution Apply topically 2 (two) times daily. 60 mL 1    FLUoxetine 20 MG capsule Take 1 capsule (20 mg total) by  mouth once daily. 90 capsule 1    folic acid (FOLVITE) 1 MG tablet 1 tablet      HIBICLENS 4 % external liquid       ibuprofen (ADVIL,MOTRIN) 600 MG tablet ibuprofen 600 mg tablet      lisdexamfetamine (VYVANSE) 20 MG capsule Take 1 capsule (20 mg total) by mouth every morning. 30 capsule 0    mupirocin (BACTROBAN) 2 % ointment       valACYclovir (VALTREX) 500 MG tablet Take 500 mg by mouth as needed. DO NOT TAKE AM OF SURGERY       No current facility-administered medications for this visit.       Review of Systems   Constitutional:  Negative for activity change, appetite change, fatigue and unexpected weight change.   HENT:  Negative for congestion, ear pain, hearing loss, postnasal drip, rhinorrhea, sinus pressure, sinus pain, sneezing, sore throat and trouble swallowing.    Eyes:  Negative for photophobia, pain, discharge and visual disturbance.   Respiratory:  Positive for shortness of breath. Negative for cough, chest tightness and wheezing.    Cardiovascular:  Negative for chest pain, palpitations and leg swelling.   Gastrointestinal:  Negative for abdominal distention, abdominal pain, blood in stool, constipation, diarrhea, nausea and vomiting.   Endocrine: Negative for cold intolerance, heat intolerance, polydipsia and polyuria.   Genitourinary:  Negative for difficulty urinating, dysuria, flank pain, frequency, hematuria, menstrual problem, pelvic pain and urgency.   Musculoskeletal:  Negative for arthralgias, back pain, joint swelling, myalgias and neck pain.   Skin:  Negative for pallor.   Allergic/Immunologic: Negative for environmental allergies and food allergies.   Neurological:  Positive for numbness and headaches. Negative for dizziness, syncope, weakness and light-headedness.   Hematological:  Does not bruise/bleed easily.   Psychiatric/Behavioral:  Positive for decreased concentration and dysphoric mood. Negative for agitation, confusion and sleep disturbance. The patient is nervous/anxious and  has insomnia.        Objective:        Physical Exam:   Physical Exam  Constitutional:       General: She is not in acute distress.     Appearance: Normal appearance. She is well-developed, well-groomed and normal weight.   HENT:      Head: Normocephalic and atraumatic.      Nose: Nose normal.   Eyes:      General: Lids are normal.   Neck:      Trachea: No tracheal deviation.   Pulmonary:      Effort: Pulmonary effort is normal. No respiratory distress.   Musculoskeletal:      Cervical back: Normal range of motion.   Skin:     Coloration: Skin is not pale.   Neurological:      Mental Status: She is alert and oriented to person, place, and time.      GCS: GCS eye subscore is 4. GCS verbal subscore is 5. GCS motor subscore is 6.   Psychiatric:         Attention and Perception: Attention and perception normal.         Mood and Affect: Mood and affect normal.         Speech: Speech normal.         Behavior: Behavior normal.         Thought Content: Thought content normal.         Cognition and Memory: Cognition and memory normal.         Judgment: Judgment normal.            Assessment:       1. Anxiety    2. Attention and concentration deficit      Plan:   Anxiety  -     FLUoxetine 20 MG capsule; Take 1 capsule (20 mg total) by mouth once daily.  Dispense: 90 capsule; Refill: 1  -     ALPRAZolam (XANAX) 0.5 MG tablet; Take 1 tablet (0.5 mg total) by mouth 2 (two) times daily as needed for Anxiety.  Dispense: 60 tablet; Refill: 2    Attention and concentration deficit  -     lisdexamfetamine (VYVANSE) 20 MG capsule; Take 1 capsule (20 mg total) by mouth every morning.  Dispense: 30 capsule; Refill: 0        Follow up in about 3 months (around 5/7/2023) for anxiety.    Total time spent with patient: 25 mins    Each patient to whom he or she provides medical services by telemedicine is:  (1) informed of the relationship between the physician and patient and the respective role of any other health care provider with  respect to management of the patient; and (2) notified that he or she may decline to receive medical services by telemedicine and may withdraw from such care at any time.    This note was created using [employer] voice recognition software that occasionally misinterprets phrases or words.

## 2023-04-05 DIAGNOSIS — Z80.0 FAMILY HISTORY OF COLON CANCER: ICD-10-CM

## 2023-04-05 DIAGNOSIS — R10.32 ABDOMINAL PAIN, LEFT LOWER QUADRANT: Primary | ICD-10-CM

## 2023-04-05 DIAGNOSIS — K59.04 CHRONIC IDIOPATHIC CONSTIPATION: ICD-10-CM

## 2023-04-13 ENCOUNTER — HOSPITAL ENCOUNTER (EMERGENCY)
Facility: HOSPITAL | Age: 33
Discharge: HOME | End: 2023-04-14
Attending: EMERGENCY MEDICINE
Payer: COMMERCIAL

## 2023-04-13 DIAGNOSIS — E86.0 DEHYDRATION: Primary | ICD-10-CM

## 2023-04-13 DIAGNOSIS — N12 PYELONEPHRITIS: ICD-10-CM

## 2023-04-13 LAB
ALBUMIN SERPL BCP-MCNC: 3.4 G/DL (ref 3.5–5.2)
ALP SERPL-CCNC: 32 U/L (ref 55–135)
ALT SERPL W/O P-5'-P-CCNC: 13 U/L (ref 10–44)
ANION GAP SERPL CALC-SCNC: 12 MMOL/L (ref 8–16)
AST SERPL-CCNC: 18 U/L (ref 10–40)
B-HCG UR QL: NEGATIVE
BACTERIA #/AREA URNS HPF: ABNORMAL /HPF
BASOPHILS # BLD AUTO: 0.05 K/UL (ref 0–0.2)
BASOPHILS NFR BLD: 0.4 % (ref 0–1.9)
BILIRUB SERPL-MCNC: 1 MG/DL (ref 0.1–1)
BILIRUB UR QL STRIP: NEGATIVE
BUN SERPL-MCNC: 9 MG/DL (ref 6–20)
CALCIUM SERPL-MCNC: 8.5 MG/DL (ref 8.7–10.5)
CHLORIDE SERPL-SCNC: 99 MMOL/L (ref 95–110)
CK SERPL-CCNC: 69 U/L (ref 20–180)
CLARITY UR: CLEAR
CO2 SERPL-SCNC: 24 MMOL/L (ref 23–29)
COLOR UR: YELLOW
CREAT SERPL-MCNC: 0.7 MG/DL (ref 0.5–1.4)
CTP QC/QA: YES
DIFFERENTIAL METHOD: ABNORMAL
EOSINOPHIL # BLD AUTO: 0.1 K/UL (ref 0–0.5)
EOSINOPHIL NFR BLD: 0.7 % (ref 0–8)
ERYTHROCYTE [DISTWIDTH] IN BLOOD BY AUTOMATED COUNT: 13.7 % (ref 11.5–14.5)
EST. GFR  (NO RACE VARIABLE): >60 ML/MIN/1.73 M^2
GLUCOSE SERPL-MCNC: 99 MG/DL (ref 70–110)
GLUCOSE UR QL STRIP: NEGATIVE
HCT VFR BLD AUTO: 33.2 % (ref 37–48.5)
HGB BLD-MCNC: 12.1 G/DL (ref 12–16)
HGB UR QL STRIP: ABNORMAL
HYALINE CASTS #/AREA URNS LPF: 4 /LPF
IMM GRANULOCYTES # BLD AUTO: 0.09 K/UL (ref 0–0.04)
IMM GRANULOCYTES NFR BLD AUTO: 0.7 % (ref 0–0.5)
INFLUENZA A, MOLECULAR: NEGATIVE
INFLUENZA B, MOLECULAR: NEGATIVE
KETONES UR QL STRIP: NEGATIVE
LACTATE SERPL-SCNC: 0.9 MMOL/L (ref 0.5–1.9)
LEUKOCYTE ESTERASE UR QL STRIP: ABNORMAL
LYMPHOCYTES # BLD AUTO: 2.1 K/UL (ref 1–4.8)
LYMPHOCYTES NFR BLD: 15 % (ref 18–48)
MCH RBC QN AUTO: 30.9 PG (ref 27–31)
MCHC RBC AUTO-ENTMCNC: 36.4 G/DL (ref 32–36)
MCV RBC AUTO: 85 FL (ref 82–98)
MICROSCOPIC COMMENT: ABNORMAL
MONOCYTES # BLD AUTO: 2.3 K/UL (ref 0.3–1)
MONOCYTES NFR BLD: 16.3 % (ref 4–15)
NEUTROPHILS # BLD AUTO: 9.3 K/UL (ref 1.8–7.7)
NEUTROPHILS NFR BLD: 66.9 % (ref 38–73)
NITRITE UR QL STRIP: NEGATIVE
NRBC BLD-RTO: 0 /100 WBC
PH UR STRIP: 6 [PH] (ref 5–8)
PLATELET # BLD AUTO: 280 K/UL (ref 150–450)
PMV BLD AUTO: 9.3 FL (ref 9.2–12.9)
POTASSIUM SERPL-SCNC: 2.9 MMOL/L (ref 3.5–5.1)
PROT SERPL-MCNC: 7.3 G/DL (ref 6–8.4)
PROT UR QL STRIP: ABNORMAL
RBC # BLD AUTO: 3.92 M/UL (ref 4–5.4)
RBC #/AREA URNS HPF: 13 /HPF (ref 0–4)
SARS-COV-2 RDRP RESP QL NAA+PROBE: NEGATIVE
SODIUM SERPL-SCNC: 135 MMOL/L (ref 136–145)
SP GR UR STRIP: 1.01 (ref 1–1.03)
SPECIMEN SOURCE: NORMAL
SQUAMOUS #/AREA URNS HPF: 1 /HPF
URN SPEC COLLECT METH UR: ABNORMAL
UROBILINOGEN UR STRIP-ACNC: ABNORMAL EU/DL
WBC # BLD AUTO: 13.81 K/UL (ref 3.9–12.7)
WBC #/AREA URNS HPF: 25 /HPF (ref 0–5)

## 2023-04-13 PROCEDURE — 82550 ASSAY OF CK (CPK): CPT | Performed by: EMERGENCY MEDICINE

## 2023-04-13 PROCEDURE — 87040 BLOOD CULTURE FOR BACTERIA: CPT | Performed by: EMERGENCY MEDICINE

## 2023-04-13 PROCEDURE — 81001 URINALYSIS AUTO W/SCOPE: CPT

## 2023-04-13 PROCEDURE — 81025 URINE PREGNANCY TEST: CPT | Performed by: EMERGENCY MEDICINE

## 2023-04-13 PROCEDURE — 25000003 PHARM REV CODE 250

## 2023-04-13 PROCEDURE — 85025 COMPLETE CBC W/AUTO DIFF WBC: CPT | Performed by: EMERGENCY MEDICINE

## 2023-04-13 PROCEDURE — 87502 INFLUENZA DNA AMP PROBE: CPT

## 2023-04-13 PROCEDURE — 96361 HYDRATE IV INFUSION ADD-ON: CPT

## 2023-04-13 PROCEDURE — 96365 THER/PROPH/DIAG IV INF INIT: CPT

## 2023-04-13 PROCEDURE — 87186 SC STD MICRODIL/AGAR DIL: CPT

## 2023-04-13 PROCEDURE — 87077 CULTURE AEROBIC IDENTIFY: CPT

## 2023-04-13 PROCEDURE — 25000003 PHARM REV CODE 250: Performed by: EMERGENCY MEDICINE

## 2023-04-13 PROCEDURE — 99284 EMERGENCY DEPT VISIT MOD MDM: CPT | Mod: 25

## 2023-04-13 PROCEDURE — 87086 URINE CULTURE/COLONY COUNT: CPT

## 2023-04-13 PROCEDURE — 83605 ASSAY OF LACTIC ACID: CPT | Performed by: EMERGENCY MEDICINE

## 2023-04-13 PROCEDURE — 63600175 PHARM REV CODE 636 W HCPCS: Performed by: EMERGENCY MEDICINE

## 2023-04-13 PROCEDURE — U0002 COVID-19 LAB TEST NON-CDC: HCPCS

## 2023-04-13 PROCEDURE — 80053 COMPREHEN METABOLIC PANEL: CPT | Performed by: EMERGENCY MEDICINE

## 2023-04-13 RX ORDER — CEFUROXIME AXETIL 500 MG/1
500 TABLET ORAL EVERY 12 HOURS
Qty: 20 TABLET | Refills: 0 | Status: SHIPPED | OUTPATIENT
Start: 2023-04-13 | End: 2023-07-18 | Stop reason: ALTCHOICE

## 2023-04-13 RX ORDER — ONDANSETRON 4 MG/1
8 TABLET, ORALLY DISINTEGRATING ORAL EVERY 12 HOURS PRN
Qty: 10 TABLET | Refills: 1 | Status: SHIPPED | OUTPATIENT
Start: 2023-04-13 | End: 2023-04-18

## 2023-04-13 RX ORDER — ACETAMINOPHEN 325 MG/1
650 TABLET ORAL
Status: COMPLETED | OUTPATIENT
Start: 2023-04-13 | End: 2023-04-13

## 2023-04-13 RX ORDER — ONDANSETRON 4 MG/1
4 TABLET, ORALLY DISINTEGRATING ORAL
Status: COMPLETED | OUTPATIENT
Start: 2023-04-13 | End: 2023-04-13

## 2023-04-13 RX ORDER — POTASSIUM CHLORIDE 750 MG/1
50 CAPSULE, EXTENDED RELEASE ORAL ONCE
Status: COMPLETED | OUTPATIENT
Start: 2023-04-14 | End: 2023-04-13

## 2023-04-13 RX ADMIN — ONDANSETRON 4 MG: 4 TABLET, ORALLY DISINTEGRATING ORAL at 08:04

## 2023-04-13 RX ADMIN — POTASSIUM CHLORIDE 50 MEQ: 750 CAPSULE, EXTENDED RELEASE ORAL at 11:04

## 2023-04-13 RX ADMIN — SODIUM CHLORIDE, SODIUM LACTATE, POTASSIUM CHLORIDE, AND CALCIUM CHLORIDE 2313 ML: .6; .31; .03; .02 INJECTION, SOLUTION INTRAVENOUS at 10:04

## 2023-04-13 RX ADMIN — CEFTRIAXONE SODIUM 2 G: 2 INJECTION, POWDER, FOR SOLUTION INTRAMUSCULAR; INTRAVENOUS at 11:04

## 2023-04-13 RX ADMIN — ACETAMINOPHEN 650 MG: 325 TABLET ORAL at 08:04

## 2023-04-13 RX ADMIN — IBUPROFEN 600 MG: 400 TABLET ORAL at 10:04

## 2023-04-14 VITALS
TEMPERATURE: 98 F | BODY MASS INDEX: 29.02 KG/M2 | HEART RATE: 73 BPM | SYSTOLIC BLOOD PRESSURE: 114 MMHG | HEIGHT: 64 IN | RESPIRATION RATE: 18 BRPM | WEIGHT: 170 LBS | OXYGEN SATURATION: 99 % | DIASTOLIC BLOOD PRESSURE: 76 MMHG

## 2023-04-14 NOTE — ED PROVIDER NOTES
Encounter Date: 2023       History     Chief Complaint   Patient presents with    Nausea    Vomiting    Chills     Chills x 3 days  Recently returned from [place]     32-year-old female presented emergency department with headache and nausea vomiting and generalized malaise and fever for 3 days.  Patient said she was coming back from [place] and while she was there she was in a truck without air condition and got overheated and while she was overheated started having headache and nausea and vomiting.  Patient said it was very hot at that time.  Denies chest pain or shortness of breath or dysuria or hematuria.  Denies any focal weakness however has generalized malaise and fatigue and weakness since then.  Denies any photophobia or neck stiffness.  Symptoms ongoing for 3 days.    Review of patient's allergies indicates:  No Known Allergies  Past Medical History:   Diagnosis Date    Anxiety     Chest pain     Herpes simplex without mention of complication     History of Amplatzer atrial septal defect closure 2016    History of atrial septal defect 2016    Migraine     PONV (postoperative nausea and vomiting)     Sickle cell trait     Vaso vagal episode     Vasovagal syncope     last      Past Surgical History:   Procedure Laterality Date    CARDIAC CATHETERIZATION  2006    ASD closure     SECTION  ,2016,2013    COLONOSCOPY  2014    HYSTEROSCOPY WITH DILATION AND CURETTAGE OF UTERUS N/A 2020    Procedure: HYSTEROSCOPY, WITH DILATION AND CURETTAGE OF UTERUS;  Surgeon: Julissa Munoz MD;  Location: [facility] OR;  Service: OB/GYN;  Laterality: N/A;    THERMAL ABLATION OF ENDOMETRIUM USING HYSTEROSCOPY N/A 2020    Procedure: ABLATION, ENDOMETRIUM, THERMAL, HYSTEROSCOPIC - Novasure;  Surgeon: Julissa Munoz MD;  Location: [facility] OR;  Service: OB/GYN;  Laterality: N/A;    TUBAL LIGATION       Family History   Problem Relation Age of Onset    Arthritis Father     Hypertension  Father     Stroke Father     Heart disease Father     No Known Problems Mother     Asthma Sister     Asthma Daughter     Cancer Paternal Grandmother         breast and bone cancer    Cancer Paternal Grandfather         Colon cancer    Diabetes Paternal Grandfather     No Known Problems Daughter     No Known Problems Daughter      Social History     Tobacco Use    Smoking status: Never    Smokeless tobacco: Never   Substance Use Topics    Alcohol use: No    Drug use: No     Review of Systems   Constitutional:  Positive for fatigue and fever.   HENT: Negative.     Eyes: Negative.    Respiratory: Negative.     Cardiovascular: Negative.  Negative for chest pain.   Gastrointestinal:  Positive for nausea and vomiting.   Endocrine: Negative.    Genitourinary: Negative.    Musculoskeletal:  Positive for myalgias.   Skin: Negative.    Allergic/Immunologic: Negative.    Neurological:  Positive for headaches.   Hematological: Negative.    Psychiatric/Behavioral: Negative.     All other systems reviewed and are negative.    Physical Exam     Initial Vitals [04/13/23 2035]   BP Pulse Resp Temp SpO2   131/86 96 20 (!) 102.1 °F (38.9 °C) 100 %      MAP       --         Physical Exam    Nursing note and vitals reviewed.  Constitutional: She appears well-developed and well-nourished.   HENT:   Head: Normocephalic and atraumatic.   Nose: Nose normal.   Mouth/Throat: Oropharynx is clear and moist.   Eyes: Conjunctivae and EOM are normal. Pupils are equal, round, and reactive to light.   Neck: Neck supple. No thyromegaly present. No tracheal deviation present. No JVD present.   Normal range of motion.  Cardiovascular:  Normal rate, regular rhythm, normal heart sounds and intact distal pulses.           No murmur heard.  Pulmonary/Chest: Breath sounds normal. No stridor. No respiratory distress. She has no wheezes. She has no rales.   Abdominal: Abdomen is soft. Bowel sounds are normal. She exhibits no distension. There is abdominal  tenderness.   Musculoskeletal:         General: No edema. Normal range of motion.      Cervical back: Normal range of motion and neck supple.     Neurological: She is alert and oriented to person, place, and time. She has normal strength. GCS score is 15. GCS eye subscore is 4. GCS verbal subscore is 5. GCS motor subscore is 6.   Skin: Skin is warm and dry. Capillary refill takes less than 2 seconds.   Psychiatric: She has a normal mood and affect. Thought content normal.       ED Course   Procedures  Labs Reviewed   URINALYSIS, REFLEX TO URINE CULTURE - Abnormal; Notable for the following components:       Result Value    Protein, UA 1+ (*)     Occult Blood UA 2+ (*)     Urobilinogen, UA 2.0-3.0 (*)     Leukocytes, UA 2+ (*)     All other components within normal limits    Narrative:     Specimen Source->Urine   CBC W/ AUTO DIFFERENTIAL - Abnormal; Notable for the following components:    WBC 13.81 (*)     RBC 3.92 (*)     Hematocrit 33.2 (*)     MCHC 36.4 (*)     Immature Granulocytes 0.7 (*)     Gran # (ANC) 9.3 (*)     Immature Grans (Abs) 0.09 (*)     Mono # 2.3 (*)     Lymph % 15.0 (*)     Mono % 16.3 (*)     All other components within normal limits   COMPREHENSIVE METABOLIC PANEL - Abnormal; Notable for the following components:    Sodium 135 (*)     Potassium 2.9 (*)     Calcium 8.5 (*)     Albumin 3.4 (*)     Alkaline Phosphatase 32 (*)     All other components within normal limits    Narrative:     Potassium critical result(s) repeated. Called and verbal readback   obtained from Priscilla Bryant Rn/ER by ADRIENNE 04/13/2023 22:35   URINALYSIS MICROSCOPIC - Abnormal; Notable for the following components:    RBC, UA 13 (*)     WBC, UA 25 (*)     Bacteria Few (*)     Hyaline Casts, UA 4 (*)     All other components within normal limits    Narrative:     Specimen Source->Urine   CULTURE, BLOOD   CULTURE, BLOOD   CULTURE, URINE   SARS-COV-2 RNA AMPLIFICATION, QUAL   INFLUENZA A AND B ANTIGEN    Narrative:     Specimen  Source->Nasopharyngeal Swab   LACTIC ACID, PLASMA   CK   POCT URINE PREGNANCY          Imaging Results              X-Ray Chest AP Portable (In process)                   X-Rays:   Independently Interpreted Readings:   Other Readings:  Chest x-ray unremarkable  Medications   potassium chloride CR capsule 50 mEq (has no administration in time range)   cefTRIAXone (ROCEPHIN) 2 g in dextrose 5 % 100 mL IVPB (ready to mix) (has no administration in time range)   acetaminophen tablet 650 mg (650 mg Oral Given 4/13/23 2053)   ondansetron disintegrating tablet 4 mg (4 mg Oral Given 4/13/23 2055)   lactated ringers bolus 2,313 mL (2,313 mLs Intravenous New Bag 4/13/23 2208)   ibuprofen tablet 600 mg (600 mg Oral Given 4/13/23 2213)     Medical Decision Making:   Differential Diagnosis:   Patient with headache and generalized malaise and fatigue and nausea and vomiting after heat exposure and symptoms and presentation consistent with heat stroke.  Differential diagnosis include infectious causes of fever, viral syndrome, heat stroke, rhabdomyolysis.  Will hydrate patient and screening labs and workup to be done.  Independently Interpreted Test(s):   I have ordered and independently interpreted X-rays - see prior notes.  Clinical Tests:   Lab Tests: Reviewed  Radiological Study: Reviewed  ED Management:  32-year-old female presented emergency department with generalized malaise and fever and body aches.  Patient has a headache.  CPK within normal limits.  Patient felt better after hydration.  Patient does have evidence of urinary tract infection associated with fever likely has pyelonephritis.  Started treatment with broad-spectrum antibiotic and advised to drink lots of fluids.  Discharged with return precautions and instructions and follow-up                        Clinical Impression:   Final diagnoses:  [E86.0] Dehydration (Primary)  [N12] Pyelonephritis        ED Disposition Condition    Discharge Stable          ED  Prescriptions       Medication Sig Dispense Start Date End Date Auth. Provider    cefUROXime (CEFTIN) 500 MG tablet Take 1 tablet (500 mg total) by mouth every 12 (twelve) hours. 20 tablet 4/13/2023 -- Davin Guerrero MD    ondansetron (ZOFRAN-ODT) 4 MG TbDL Take 2 tablets (8 mg total) by mouth every 12 (twelve) hours as needed. 10 tablet 4/13/2023 4/18/2023 Davin Guerrero MD          Follow-up Information       Follow up With Specialties Details Why Contact Info    Amparo Monroe, AMALIA,FNP-C Family Medicine In 2 days  [address] [facility] [address]  [phone]               Davin Guerrero MD  04/13/23 [id number]

## 2023-04-14 NOTE — DISCHARGE INSTRUCTIONS
Drink lots of fluids.  Rest.  Return to emergency department for worsening symptoms or any problems.  Take Tylenol for fever.  Rest.

## 2023-04-15 LAB — BACTERIA UR CULT: ABNORMAL

## 2023-04-18 LAB
BACTERIA BLD CULT: NORMAL
BACTERIA BLD CULT: NORMAL

## 2023-04-21 ENCOUNTER — HOSPITAL ENCOUNTER (OUTPATIENT)
Dept: RADIOLOGY | Facility: HOSPITAL | Age: 33
Discharge: HOME | End: 2023-04-21
Attending: INTERNAL MEDICINE
Payer: COMMERCIAL

## 2023-04-21 DIAGNOSIS — Z80.0 FAMILY HISTORY OF COLON CANCER: ICD-10-CM

## 2023-04-21 DIAGNOSIS — R10.32 ABDOMINAL PAIN, LEFT LOWER QUADRANT: ICD-10-CM

## 2023-04-21 DIAGNOSIS — K59.04 CHRONIC IDIOPATHIC CONSTIPATION: ICD-10-CM

## 2023-04-21 PROCEDURE — 25500020 PHARM REV CODE 255: Mod: PO | Performed by: INTERNAL MEDICINE

## 2023-04-21 PROCEDURE — 74177 CT ABD & PELVIS W/CONTRAST: CPT | Mod: TC,PO

## 2023-04-21 RX ADMIN — IOHEXOL 100 ML: 350 INJECTION, SOLUTION INTRAVENOUS at 10:04

## 2023-06-26 DIAGNOSIS — L65.9 ALOPECIA: ICD-10-CM

## 2023-06-26 RX ORDER — FLUOCINONIDE TOPICAL SOLUTION USP, 0.05% 0.5 MG/ML
SOLUTION TOPICAL 2 TIMES DAILY
Qty: 60 ML | Refills: 1 | Status: SHIPPED | OUTPATIENT
Start: 2023-06-26 | End: 2023-10-09 | Stop reason: SDUPTHER

## 2023-07-05 ENCOUNTER — LAB VISIT (OUTPATIENT)
Dept: LAB | Facility: HOSPITAL | Age: 33
End: 2023-07-05
Attending: INTERNAL MEDICINE
Payer: COMMERCIAL

## 2023-07-05 DIAGNOSIS — Z80.0 FAMILY HISTORY OF MALIGNANT NEOPLASM OF GASTROINTESTINAL TRACT: ICD-10-CM

## 2023-07-05 DIAGNOSIS — K59.04 CHRONIC IDIOPATHIC CONSTIPATION: ICD-10-CM

## 2023-07-05 DIAGNOSIS — M62.89 MYOFIBROSIS: ICD-10-CM

## 2023-07-05 DIAGNOSIS — K58.0 IRRITABLE BOWEL SYNDROME WITH DIARRHEA: ICD-10-CM

## 2023-07-05 DIAGNOSIS — R10.32 ABDOMINAL PAIN, LEFT LOWER QUADRANT: Primary | ICD-10-CM

## 2023-07-05 PROCEDURE — 36415 COLL VENOUS BLD VENIPUNCTURE: CPT | Performed by: INTERNAL MEDICINE

## 2023-07-05 PROCEDURE — 86364 TISS TRNSGLTMNASE EA IG CLAS: CPT | Performed by: INTERNAL MEDICINE

## 2023-07-05 PROCEDURE — 86003 ALLG SPEC IGE CRUDE XTRC EA: CPT | Performed by: INTERNAL MEDICINE

## 2023-07-05 PROCEDURE — 86258 DGP ANTIBODY EACH IG CLASS: CPT | Mod: 59 | Performed by: INTERNAL MEDICINE

## 2023-07-08 LAB
ENDOMYSIUM IGA SER QL: NEGATIVE
GLIADIN PEPTIDE IGA SER-ACNC: 6 UNITS (ref 0–19)
GLIADIN PEPTIDE IGG SER-ACNC: 4 UNITS (ref 0–19)
IGA SERPL-MCNC: 173 MG/DL (ref 87–352)
TTG IGA SER-ACNC: <2 U/ML (ref 0–3)
TTG IGG SER-ACNC: <2 U/ML (ref 0–5)

## 2023-07-10 LAB
ALLERGEN CLASS DESCRIPTION: ABNORMAL
BEEF IGE QN: <0.1 KU/L
COCOA IGE QN: <0.1 KU/L
CORN IGE QN: <0.1 KU/L
COW MILK IGE QN: <0.1 KU/L
FOOD MIX, SEAFOOD, IGE: NEGATIVE
PEANUT IGE QN: <0.1 KU/L
PORK IGE QN: <0.1 KU/L
SOYBEAN IGE QN: <0.1 KU/L
WHEAT IGE QN: 0.18 KU/L
WHOLE EGG IGE QN: <0.1 KU/L

## 2023-07-18 ENCOUNTER — OFFICE VISIT (OUTPATIENT)
Dept: FAMILY MEDICINE | Facility: CLINIC | Age: 33
End: 2023-07-18
Payer: COMMERCIAL

## 2023-07-18 VITALS
HEIGHT: 64 IN | DIASTOLIC BLOOD PRESSURE: 68 MMHG | WEIGHT: 177 LBS | HEART RATE: 69 BPM | OXYGEN SATURATION: 99 % | BODY MASS INDEX: 30.22 KG/M2 | SYSTOLIC BLOOD PRESSURE: 110 MMHG

## 2023-07-18 DIAGNOSIS — R41.840 ATTENTION AND CONCENTRATION DEFICIT: ICD-10-CM

## 2023-07-18 DIAGNOSIS — F41.9 ANXIETY: Primary | ICD-10-CM

## 2023-07-18 DIAGNOSIS — Z00.00 ROUTINE HEALTH MAINTENANCE: ICD-10-CM

## 2023-07-18 PROCEDURE — 3074F SYST BP LT 130 MM HG: CPT | Mod: CPTII,S$GLB,, | Performed by: NURSE PRACTITIONER

## 2023-07-18 PROCEDURE — 1160F RVW MEDS BY RX/DR IN RCRD: CPT | Mod: CPTII,S$GLB,, | Performed by: NURSE PRACTITIONER

## 2023-07-18 PROCEDURE — 3008F BODY MASS INDEX DOCD: CPT | Mod: CPTII,S$GLB,, | Performed by: NURSE PRACTITIONER

## 2023-07-18 PROCEDURE — 1159F PR MEDICATION LIST DOCUMENTED IN MEDICAL RECORD: ICD-10-PCS | Mod: CPTII,S$GLB,, | Performed by: NURSE PRACTITIONER

## 2023-07-18 PROCEDURE — 3078F DIAST BP <80 MM HG: CPT | Mod: CPTII,S$GLB,, | Performed by: NURSE PRACTITIONER

## 2023-07-18 PROCEDURE — 3008F PR BODY MASS INDEX (BMI) DOCUMENTED: ICD-10-PCS | Mod: CPTII,S$GLB,, | Performed by: NURSE PRACTITIONER

## 2023-07-18 PROCEDURE — 99214 OFFICE O/P EST MOD 30 MIN: CPT | Mod: S$GLB,,, | Performed by: NURSE PRACTITIONER

## 2023-07-18 PROCEDURE — 99214 PR OFFICE/OUTPT VISIT, EST, LEVL IV, 30-39 MIN: ICD-10-PCS | Mod: S$GLB,,, | Performed by: NURSE PRACTITIONER

## 2023-07-18 PROCEDURE — 3074F PR MOST RECENT SYSTOLIC BLOOD PRESSURE < 130 MM HG: ICD-10-PCS | Mod: CPTII,S$GLB,, | Performed by: NURSE PRACTITIONER

## 2023-07-18 PROCEDURE — 1160F PR REVIEW ALL MEDS BY PRESCRIBER/CLIN PHARMACIST DOCUMENTED: ICD-10-PCS | Mod: CPTII,S$GLB,, | Performed by: NURSE PRACTITIONER

## 2023-07-18 PROCEDURE — 3078F PR MOST RECENT DIASTOLIC BLOOD PRESSURE < 80 MM HG: ICD-10-PCS | Mod: CPTII,S$GLB,, | Performed by: NURSE PRACTITIONER

## 2023-07-18 PROCEDURE — 1159F MED LIST DOCD IN RCRD: CPT | Mod: CPTII,S$GLB,, | Performed by: NURSE PRACTITIONER

## 2023-07-18 RX ORDER — PREDNISONE 10 MG/1
TABLET ORAL
COMMUNITY
End: 2023-07-18 | Stop reason: ALTCHOICE

## 2023-07-18 RX ORDER — LISDEXAMFETAMINE DIMESYLATE CAPSULES 20 MG/1
20 CAPSULE ORAL EVERY MORNING
Qty: 30 CAPSULE | Refills: 0 | Status: SHIPPED | OUTPATIENT
Start: 2023-07-18 | End: 2024-04-02 | Stop reason: SDUPTHER

## 2023-07-18 RX ORDER — POLYETHYLENE GLYCOL 3350, SODIUM SULFATE, SODIUM CHLORIDE, POTASSIUM CHLORIDE, ASCORBIC ACID, SODIUM ASCORBATE 140-9-5.2G
KIT ORAL
COMMUNITY
Start: 2023-04-05

## 2023-07-18 RX ORDER — VENLAFAXINE 75 MG/1
TABLET ORAL
COMMUNITY
End: 2023-07-18 | Stop reason: ALTCHOICE

## 2023-07-18 RX ORDER — ALPRAZOLAM 0.5 MG/1
0.5 TABLET ORAL 2 TIMES DAILY PRN
Qty: 60 TABLET | Refills: 2 | Status: SHIPPED | OUTPATIENT
Start: 2023-07-18

## 2023-07-18 RX ORDER — PLECANATIDE 3 MG/1
1 TABLET ORAL DAILY
COMMUNITY

## 2023-07-18 RX ORDER — FLUOXETINE HYDROCHLORIDE 20 MG/1
20 CAPSULE ORAL DAILY
Qty: 90 CAPSULE | Refills: 1 | Status: SHIPPED | OUTPATIENT
Start: 2023-07-18 | End: 2024-04-02

## 2023-07-18 RX ORDER — LISDEXAMFETAMINE DIMESYLATE CAPSULES 20 MG/1
20 CAPSULE ORAL EVERY MORNING
Qty: 30 CAPSULE | Refills: 0 | Status: SHIPPED | OUTPATIENT
Start: 2023-08-17 | End: 2024-04-02 | Stop reason: SDUPTHER

## 2023-07-18 RX ORDER — LISDEXAMFETAMINE DIMESYLATE CAPSULES 20 MG/1
20 CAPSULE ORAL EVERY MORNING
Qty: 30 CAPSULE | Refills: 0 | Status: SHIPPED | OUTPATIENT
Start: 2023-09-16 | End: 2024-04-02 | Stop reason: SDUPTHER

## 2023-07-19 ENCOUNTER — LAB VISIT (OUTPATIENT)
Dept: LAB | Facility: HOSPITAL | Age: 33
End: 2023-07-19
Attending: NURSE PRACTITIONER
Payer: COMMERCIAL

## 2023-07-19 ENCOUNTER — PATIENT MESSAGE (OUTPATIENT)
Dept: RESEARCH | Facility: HOSPITAL | Age: 33
End: 2023-07-19
Payer: COMMERCIAL

## 2023-07-19 DIAGNOSIS — Z00.00 ROUTINE HEALTH MAINTENANCE: ICD-10-CM

## 2023-07-19 DIAGNOSIS — R11.2 NAUSEA WITH VOMITING: Primary | ICD-10-CM

## 2023-07-19 LAB
ALBUMIN SERPL BCP-MCNC: 4.2 G/DL (ref 3.5–5.2)
ALP SERPL-CCNC: 43 U/L (ref 55–135)
ALT SERPL W/O P-5'-P-CCNC: 14 U/L (ref 10–44)
ANION GAP SERPL CALC-SCNC: 9 MMOL/L (ref 8–16)
AST SERPL-CCNC: 15 U/L (ref 10–40)
BASOPHILS # BLD AUTO: 0.06 K/UL (ref 0–0.2)
BASOPHILS NFR BLD: 0.9 % (ref 0–1.9)
BILIRUB SERPL-MCNC: 1 MG/DL (ref 0.1–1)
BILIRUB UR QL STRIP: NEGATIVE
BUN SERPL-MCNC: 11 MG/DL (ref 6–20)
CALCIUM SERPL-MCNC: 9.5 MG/DL (ref 8.7–10.5)
CHLORIDE SERPL-SCNC: 109 MMOL/L (ref 95–110)
CHOLEST SERPL-MCNC: 160 MG/DL (ref 120–199)
CHOLEST/HDLC SERPL: 2.9 {RATIO} (ref 2–5)
CLARITY UR: CLEAR
CO2 SERPL-SCNC: 21 MMOL/L (ref 23–29)
COLOR UR: YELLOW
CREAT SERPL-MCNC: 0.8 MG/DL (ref 0.5–1.4)
DIFFERENTIAL METHOD: NORMAL
EOSINOPHIL # BLD AUTO: 0.2 K/UL (ref 0–0.5)
EOSINOPHIL NFR BLD: 2.8 % (ref 0–8)
ERYTHROCYTE [DISTWIDTH] IN BLOOD BY AUTOMATED COUNT: 13.7 % (ref 11.5–14.5)
EST. GFR  (NO RACE VARIABLE): >60 ML/MIN/1.73 M^2
GLUCOSE SERPL-MCNC: 88 MG/DL (ref 70–110)
GLUCOSE UR QL STRIP: NEGATIVE
HCT VFR BLD AUTO: 39.5 % (ref 37–48.5)
HDLC SERPL-MCNC: 56 MG/DL (ref 40–75)
HDLC SERPL: 35 % (ref 20–50)
HGB BLD-MCNC: 14 G/DL (ref 12–16)
HGB UR QL STRIP: NEGATIVE
IMM GRANULOCYTES # BLD AUTO: 0.02 K/UL (ref 0–0.04)
IMM GRANULOCYTES NFR BLD AUTO: 0.3 % (ref 0–0.5)
KETONES UR QL STRIP: NEGATIVE
LDLC SERPL CALC-MCNC: 97.8 MG/DL (ref 63–159)
LEUKOCYTE ESTERASE UR QL STRIP: NEGATIVE
LYMPHOCYTES # BLD AUTO: 2.2 K/UL (ref 1–4.8)
LYMPHOCYTES NFR BLD: 32 % (ref 18–48)
MCH RBC QN AUTO: 30.6 PG (ref 27–31)
MCHC RBC AUTO-ENTMCNC: 35.4 G/DL (ref 32–36)
MCV RBC AUTO: 86 FL (ref 82–98)
MONOCYTES # BLD AUTO: 0.7 K/UL (ref 0.3–1)
MONOCYTES NFR BLD: 10.4 % (ref 4–15)
NEUTROPHILS # BLD AUTO: 3.7 K/UL (ref 1.8–7.7)
NEUTROPHILS NFR BLD: 53.6 % (ref 38–73)
NITRITE UR QL STRIP: NEGATIVE
NONHDLC SERPL-MCNC: 104 MG/DL
NRBC BLD-RTO: 0 /100 WBC
PH UR STRIP: 6 [PH] (ref 5–8)
PLATELET # BLD AUTO: 376 K/UL (ref 150–450)
PMV BLD AUTO: 9.5 FL (ref 9.2–12.9)
POTASSIUM SERPL-SCNC: 3.9 MMOL/L (ref 3.5–5.1)
PROT SERPL-MCNC: 7.8 G/DL (ref 6–8.4)
PROT UR QL STRIP: NEGATIVE
RBC # BLD AUTO: 4.57 M/UL (ref 4–5.4)
SODIUM SERPL-SCNC: 139 MMOL/L (ref 136–145)
SP GR UR STRIP: 1.01 (ref 1–1.03)
TRIGL SERPL-MCNC: 31 MG/DL (ref 30–150)
TSH SERPL DL<=0.005 MIU/L-ACNC: 1.28 UIU/ML (ref 0.34–5.6)
URN SPEC COLLECT METH UR: NORMAL
UROBILINOGEN UR STRIP-ACNC: NEGATIVE EU/DL
WBC # BLD AUTO: 6.82 K/UL (ref 3.9–12.7)

## 2023-07-19 PROCEDURE — 85025 COMPLETE CBC W/AUTO DIFF WBC: CPT | Performed by: NURSE PRACTITIONER

## 2023-07-19 PROCEDURE — 83013 H PYLORI (C-13) BREATH: CPT | Performed by: NURSE PRACTITIONER

## 2023-07-19 PROCEDURE — 80061 LIPID PANEL: CPT | Performed by: NURSE PRACTITIONER

## 2023-07-19 PROCEDURE — 36415 COLL VENOUS BLD VENIPUNCTURE: CPT | Performed by: NURSE PRACTITIONER

## 2023-07-19 PROCEDURE — 81003 URINALYSIS AUTO W/O SCOPE: CPT | Performed by: NURSE PRACTITIONER

## 2023-07-19 PROCEDURE — 80053 COMPREHEN METABOLIC PANEL: CPT | Performed by: NURSE PRACTITIONER

## 2023-07-19 PROCEDURE — 84443 ASSAY THYROID STIM HORMONE: CPT | Performed by: NURSE PRACTITIONER

## 2023-07-19 NOTE — PROGRESS NOTES
SUBJECTIVE:      Patient ID: Arnie Browne is a 32 y.o. female.    Chief Complaint: Follow-up (Medication Refills)    Presents for problem visit    Anxiety  Presents for follow-up visit. Symptoms include decreased concentration, depressed mood, excessive worry and nervous/anxious behavior. Patient reports no chest pain, confusion, dizziness, nausea, palpitations or shortness of breath. Primary symptoms comment: ADHD. Symptoms occur constantly. The severity of symptoms is moderate. The quality of sleep is fair.     Compliance with medications is %.   Neurologic Problem  The patient's pertinent negatives include no weakness. Primary symptoms comment: ADHD. This is a chronic problem. The current episode started more than 1 year ago. The neurological problem developed insidiously. The problem has been waxing and waning since onset. There was no focality noted. Pertinent negatives include no abdominal pain, back pain, chest pain, confusion, dizziness, fatigue, light-headedness, nausea, neck pain, palpitations, shortness of breath or vomiting. Treatments tried: Vyvanse. The treatment provided significant relief. There is no history of a clotting disorder, a CVA or head trauma.     Past Surgical History:   Procedure Laterality Date    CARDIAC CATHETERIZATION  2006    ASD closure     SECTION  ,2016,2013    COLONOSCOPY  2014    HYSTEROSCOPY WITH DILATION AND CURETTAGE OF UTERUS N/A 2020    Procedure: HYSTEROSCOPY, WITH DILATION AND CURETTAGE OF UTERUS;  Surgeon: Julissa Munoz MD;  Location: [facility];  Service: OB/GYN;  Laterality: N/A;    THERMAL ABLATION OF ENDOMETRIUM USING HYSTEROSCOPY N/A 2020    Procedure: ABLATION, ENDOMETRIUM, THERMAL, HYSTEROSCOPIC - Novasure;  Surgeon: Julissa Munoz MD;  Location: [facility];  Service: OB/GYN;  Laterality: N/A;    TUBAL LIGATION       Family History   Problem Relation Age of Onset    Arthritis Father     Hypertension Father     Stroke  Father     Heart disease Father     No Known Problems Mother     Asthma Sister     Asthma Daughter     Cancer Paternal Grandmother         breast and bone cancer    Cancer Paternal Grandfather         Colon cancer    Diabetes Paternal Grandfather     No Known Problems Daughter     No Known Problems Daughter       Social History     Socioeconomic History    Marital status:     Number of children: 3   Occupational History    Occupation:    Tobacco Use    Smoking status: Never    Smokeless tobacco: Never   Substance and Sexual Activity    Alcohol use: No    Drug use: No    Sexual activity: Yes     Partners: Male     Birth control/protection: None     Social Determinants of Health     Financial Resource Strain: Low Risk     Difficulty of Paying Living Expenses: Not very hard   Food Insecurity: No Food Insecurity    Worried About Running Out of Food in the Last Year: Never true    Ran Out of Food in the Last Year: Never true   Transportation Needs: No Transportation Needs    Lack of Transportation (Medical): No    Lack of Transportation (Non-Medical): No   Physical Activity: Insufficiently Active    Days of Exercise per Week: 3 days    Minutes of Exercise per Session: 30 min   Stress: Stress Concern Present    Feeling of Stress : To some extent   Social Connections: Unknown    Frequency of Communication with Friends and Family: More than three times a week    Frequency of Social Gatherings with Friends and Family: Patient refused    Active Member of Clubs or Organizations: Yes    Attends Club or Organization Meetings: More than 4 times per year    Marital Status:    Housing Stability: Unknown    Unable to Pay for Housing in the Last Year: No    Unstable Housing in the Last Year: No     Current Outpatient Medications   Medication Sig Dispense Refill    finasteride (PROSCAR) 5 mg tablet Take 1 tablet (5 mg total) by mouth once daily. 30 tablet 11    fluocinonide (LIDEX) 0.05 % external solution Apply  topically 2 (two) times daily. 60 mL 1    folic acid (FOLVITE) 1 MG tablet 1 tablet      HIBICLENS 4 % external liquid       ibuprofen (ADVIL,MOTRIN) 600 MG tablet ibuprofen 600 mg tablet      mupirocin (BACTROBAN) 2 % ointment       plecanatide (TRULANCE) 3 mg Tab Take 1 tablet by mouth once daily.      PLENVU 140-9-5.2 gram PPkS Take by mouth.      valACYclovir (VALTREX) 500 MG tablet Take 500 mg by mouth as needed. DO NOT TAKE AM OF SURGERY      ALPRAZolam (XANAX) 0.5 MG tablet Take 1 tablet (0.5 mg total) by mouth 2 (two) times daily as needed for Anxiety. 60 tablet 2    FLUoxetine 20 MG capsule Take 1 capsule (20 mg total) by mouth once daily. 90 capsule 1    lisdexamfetamine (VYVANSE) 20 MG capsule Take 1 capsule (20 mg total) by mouth every morning. 30 capsule 0    [START ON 2023] lisdexamfetamine (VYVANSE) 20 MG capsule Take 1 capsule (20 mg total) by mouth every morning. 30 capsule 0    [START ON 2023] lisdexamfetamine (VYVANSE) 20 MG capsule Take 1 capsule (20 mg total) by mouth every morning. 30 capsule 0     No current facility-administered medications for this visit.     Review of patient's allergies indicates:  No Known Allergies   Past Medical History:   Diagnosis Date    Anxiety     Chest pain     Herpes simplex without mention of complication     History of Amplatzer atrial septal defect closure 2016    History of atrial septal defect 2016    Migraine     PONV (postoperative nausea and vomiting)     Sickle cell trait     Vaso vagal episode     Vasovagal syncope     last      Past Surgical History:   Procedure Laterality Date    CARDIAC CATHETERIZATION  2006    ASD closure     SECTION  ,2016,2013    COLONOSCOPY  2014    HYSTEROSCOPY WITH DILATION AND CURETTAGE OF UTERUS N/A 2020    Procedure: HYSTEROSCOPY, WITH DILATION AND CURETTAGE OF UTERUS;  Surgeon: Julissa Munoz MD;  Location: [facility];  Service: OB/GYN;  Laterality: N/A;    THERMAL ABLATION  "OF ENDOMETRIUM USING HYSTEROSCOPY N/A 2/28/2020    Procedure: ABLATION, ENDOMETRIUM, THERMAL, HYSTEROSCOPIC - Novasure;  Surgeon: Julissa Munoz MD;  Location: [facility];  Service: OB/GYN;  Laterality: N/A;    TUBAL LIGATION  2016       Review of Systems   Constitutional:  Negative for activity change, appetite change, fatigue and unexpected weight change.   HENT:  Negative for congestion, ear pain, hearing loss, postnasal drip, sinus pressure, sinus pain, sneezing and sore throat.    Eyes:  Negative for photophobia and pain.   Respiratory:  Negative for cough, chest tightness, shortness of breath and wheezing.    Cardiovascular:  Negative for chest pain, palpitations and leg swelling.   Gastrointestinal:  Negative for abdominal distention, abdominal pain, blood in stool, constipation, diarrhea, nausea and vomiting.   Endocrine: Negative for cold intolerance, heat intolerance, polydipsia and polyuria.   Genitourinary:  Negative for difficulty urinating, dysuria, flank pain, frequency, hematuria, pelvic pain and urgency.   Musculoskeletal:  Negative for arthralgias, back pain, joint swelling, myalgias and neck pain.   Skin:  Negative for pallor.   Allergic/Immunologic: Negative for environmental allergies and food allergies.   Neurological:  Negative for dizziness, weakness, light-headedness and numbness.   Hematological:  Does not bruise/bleed easily.   Psychiatric/Behavioral:  Positive for decreased concentration and dysphoric mood. Negative for agitation, confusion and sleep disturbance. The patient is nervous/anxious.     OBJECTIVE:      Vitals:    07/18/23 1412   BP: 110/68   BP Location: Right arm   Patient Position: Sitting   BP Method: Large (Manual)   Pulse: 69   SpO2: 99%   Weight: 80.3 kg (177 lb)   Height: 5' 4" (1.626 m)     Physical Exam  Vitals and nursing note reviewed.   Constitutional:       General: She is not in acute distress.     Appearance: Normal appearance. She is well-developed. She is " obese.      Comments: 18# loss since 7/2022 visit   HENT:      Head: Normocephalic and atraumatic.      Right Ear: Hearing normal.      Left Ear: Hearing normal.      Nose: Nose normal. No rhinorrhea.   Eyes:      General: Lids are normal.         Right eye: No discharge.         Left eye: No discharge.      Conjunctiva/sclera: Conjunctivae normal.      Right eye: Right conjunctiva is not injected.      Left eye: Left conjunctiva is not injected.      Pupils: Pupils are equal, round, and reactive to light. Pupils are equal.      Right eye: Pupil is round and reactive.      Left eye: Pupil is round and reactive.   Neck:      Thyroid: No thyromegaly.      Vascular: No JVD.      Trachea: Trachea normal. No tracheal deviation.   Cardiovascular:      Rate and Rhythm: Normal rate and regular rhythm.      Pulses:           Radial pulses are 2+ on the right side and 2+ on the left side.        Posterior tibial pulses are 2+ on the right side and 2+ on the left side.      Heart sounds: Normal heart sounds. No murmur heard.    No friction rub. No gallop.   Pulmonary:      Effort: Pulmonary effort is normal. No respiratory distress.      Breath sounds: Normal breath sounds. No stridor. No decreased breath sounds, wheezing, rhonchi or rales.   Abdominal:      General: Bowel sounds are normal. There is no distension.      Palpations: Abdomen is soft. Abdomen is not rigid.      Tenderness: There is no abdominal tenderness. There is no guarding.   Musculoskeletal:         General: Normal range of motion.      Cervical back: Normal range of motion and neck supple.   Lymphadenopathy:      Cervical: No cervical adenopathy.   Skin:     General: Skin is warm and dry.      Capillary Refill: Capillary refill takes less than 2 seconds.      Coloration: Skin is not pale.      Findings: No lesion or rash.   Neurological:      Mental Status: She is alert and oriented to person, place, and time.      GCS: GCS eye subscore is 4. GCS verbal  subscore is 5. GCS motor subscore is 6.      Motor: Motor function is intact. No atrophy.      Coordination: Coordination normal.      Gait: Gait normal.   Psychiatric:         Attention and Perception: Attention normal. She is attentive.         Mood and Affect: Mood and affect normal.         Speech: Speech normal.         Behavior: Behavior normal.         Thought Content: Thought content normal.         Cognition and Memory: Cognition normal.         Judgment: Judgment normal.      Assessment:       1. Anxiety    2. Attention and concentration deficit    3. Routine health maintenance        Plan:       Anxiety  -     ALPRAZolam (XANAX) 0.5 MG tablet; Take 1 tablet (0.5 mg total) by mouth 2 (two) times daily as needed for Anxiety.  Dispense: 60 tablet; Refill: 2  -     FLUoxetine 20 MG capsule; Take 1 capsule (20 mg total) by mouth once daily.  Dispense: 90 capsule; Refill: 1    Attention and concentration deficit  -     lisdexamfetamine (VYVANSE) 20 MG capsule; Take 1 capsule (20 mg total) by mouth every morning.  Dispense: 30 capsule; Refill: 0  -     lisdexamfetamine (VYVANSE) 20 MG capsule; Take 1 capsule (20 mg total) by mouth every morning.  Dispense: 30 capsule; Refill: 0  -     lisdexamfetamine (VYVANSE) 20 MG capsule; Take 1 capsule (20 mg total) by mouth every morning.  Dispense: 30 capsule; Refill: 0    Routine health maintenance  -     CBC Auto Differential; Future; Expected date: 07/25/2023  -     Comprehensive Metabolic Panel; Future; Expected date: 07/18/2023  -     Lipid Panel; Future; Expected date: 07/18/2023  -     TSH; Future; Expected date: 07/18/2023  -     Urinalysis, Reflex to Urine Culture Urine, Clean Catch; Future; Expected date: 07/18/2023        Follow up if symptoms worsen or fail to improve.  Already scheduled for well exam in September, at which time we will review labs.      7/19/2023 AMALIA Gonzalez, [id number]

## 2023-07-21 LAB — UREA BREATH TEST QL: NEGATIVE

## 2023-07-24 ENCOUNTER — PATIENT MESSAGE (OUTPATIENT)
Dept: RESEARCH | Facility: HOSPITAL | Age: 33
End: 2023-07-24
Payer: COMMERCIAL

## 2023-08-16 DIAGNOSIS — K62.89 EXTERNAL ANAL SPHINCTER, RELAXATION: ICD-10-CM

## 2023-08-16 DIAGNOSIS — M62.89 PELVIC FLOOR DYSFUNCTION: Primary | ICD-10-CM

## 2023-08-16 DIAGNOSIS — K59.02 DYSSYNERGIC CONSTIPATION: ICD-10-CM

## 2023-08-31 ENCOUNTER — CLINICAL SUPPORT (OUTPATIENT)
Dept: REHABILITATION | Facility: HOSPITAL | Age: 33
End: 2023-08-31
Payer: COMMERCIAL

## 2023-08-31 DIAGNOSIS — K59.02 DYSSYNERGIC DEFECATION: ICD-10-CM

## 2023-08-31 DIAGNOSIS — M62.89 PELVIC FLOOR DYSFUNCTION IN FEMALE: ICD-10-CM

## 2023-08-31 PROCEDURE — 97530 THERAPEUTIC ACTIVITIES: CPT | Mod: PN

## 2023-08-31 PROCEDURE — 97140 MANUAL THERAPY 1/> REGIONS: CPT | Mod: PN

## 2023-08-31 PROCEDURE — 97161 PT EVAL LOW COMPLEX 20 MIN: CPT | Mod: PN

## 2023-08-31 NOTE — PATIENT INSTRUCTIONS
"1) Bowel function - consider the following recommendations for optimizing bowel function. Good bowel health is important for overall pelvic floor function.     Adequate fluid intake is necessary for proper bowel function. Goal this week is to drink 64 ounces/day. Try completing your water bottle one time by lunch and second bottle by dinner time.     Fiber adds bulk to stool and promotes more frequent and regular bowel movements. Strategies to increase fiber intake:  One cup of high fiber cereal every day.  1/2 to 1 cup of prune juice or several stewed prunes every day, followed by a cup of hot water or tea.   2 cups of fruit or 2 1/2 cups vegetables, 6-8 oz high fiber grains daily  Fiber supplements, I.e. Metamucil, Citrucel, Konsyl, Benefiber  2 tablespoons of flax seed meal (can be added to cereal, smoothies, yogurt, oatmeal, etc)     Positioning and techniques for elimination     Bowel Movement Mechanics  1. Sit on the toilet comfortably with legs and buttocks relaxed.  2. Put your feet on a waste basket or squatty potty  3. Lean forward while keeping your back straight, forearms rest on knees.  4. Keep your knees apart.  5. Fully relax pelvic floor muscles - think about softening, opening, dropping  6. Use gentle belly breaths and bulge lower abdominals like making a beer belly  7. Keep belly big on exhalation  8. Exhale like blowing out birthday candles  9. Keep breathing like this through entire bowel movement  10. Make sure to give enough time, ok for it to take several minutes     Do not strain and Do not hold your breath.       (Search "Squatty Potty" on [employer])       Bowel Massage -  Try performing this massage to your abdomen every morning before getting out of bed. Try to do it for 3-5 minutes. Follow with some nice deep breaths and drink a glass of warm water afterwards.       Home Exercise Program: 8/31/2023    DIAPHRAGMATIC BREATHING     The diaphragm is a dome shaped muscle that forms the floor of " "the rib cage. It is the most efficient muscle for breathing and relaxation, although most people are not used to using the diaphragm. Diaphragmatic or belly breathing is an important technique to learn because it helps settle down or relax the autonomic nervous system. The correct use of diaphragmatic breathing can help to quiet brain activity resulting in the relaxation of all the muscles and organs of the body. This is accomplished by slow rhythmic breathing concentrated in the diaphragm muscle rather than the chest.    How to do proper relaxation breathing:    Start by lying on your back or reclining in a chair in a relaxed position. Place one hand on your chest and the other on your abdomen.  Relax your jaw by placing your tongue on the floor of your mouth and keeping your teeth slightly apart.   Take a deep breath in, letting the abdomen expand and rise while you keep your upper chest, neck and shoulders relaxed.   As you breathe out, allow your abdomen and chest to fall. Exhale completely.  It doesn't matter if you breathe in/out through your nose and/or mouth. Do whichever feels comfortable.  Remember to breathe slowly.  Do not force your breathing. Do not hold your breath.  Repeat while doing stretches listed below:        "single knee to chest" - lay on your back, use your hands to pull your left knee to your chest, keeping the right leg straight on the bed. Hold this pose while you incorporate your diaphragmatic breathing. Repeat on the opposite side. Complete 3 sets of 10 breaths on both legs.         Butterfly stretch - lay on your back, knees bent and feet together. Let your knees open so they drop down towards the table. Think about relaxing in this position; perform belly breathing. Complete 3 sets of 10 breaths        "Happy Baby" - lay on your back. Open your knees slightly wider than your torso, then bring them up toward your armpits. Position each ankle directly over the knee, so your shins are " "perpendicular to the floor. Flex through the heels. Hold this pose while you incorporate your deep breathing. Complete 3 sets of 10 breaths.    HAPPY BABY MODIFICATION:    "Modified Happy Baby" - lay on your back, use your hands to pull your knees to your chest, then bring them out wide (about even with your shoulders).         "Child's Pose" - first start by getting onto your hands and knees, then move your feet so they are touching and your knees are wider than your hips. Sit back so that you are sitting on your heels and reach your arms forward. Think about resting in this position. Complete 3 sets of 10 breaths.      CHILD'S POSE VARIATIONS:                            Deep Squat - Start standing with your feet hip-width apart. Lower yourself down into a low squat position (pictured). Hold onto a sturdy piece of furniture if you need to so that you will feel comfortable enough to relax into this stretch. Try not to hold muscle tension in your hips or thighs as you incorporate your deep breathing in this position.   Complete 3 sets of 10 breaths.      Souble Fiber Supplement (ex: Metamucil, Benefiber):     Used to bulk stool so it is solid but easy to pass  Start with 1 tsp per day  Every 3-5 days, add an additional tsp   Stop when you reach a dose that gives stools that are soft but formed  Do not exceed 6 tsp/day   Make sure to keep up with your water intake to avoid constipation   "

## 2023-09-01 NOTE — PLAN OF CARE
[facility]   Pelvic Health Physical Therapy Initial Evaluation     Date: 2023   Name: Arnie FERNANDO [facility] Number: [id number]    Therapy Diagnosis:   Encounter Diagnoses   Name Primary?    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 She went to pelvic floor physical therapy in the past after having pelvic pain with intercourse in  with pregnancy which helped.  In , she started having intestinal spasms and trouble with bowel movements after having her second daughter. At high stress moments, she could not stop the bowel movements where it felt like she always had to go to the bathroom. Fecal incontinence has happened a few times, and she only likes going to the bathroom at home. After her third birth, she began having the same issues. She goes a few weeks without a bowel movement and has severe constipation. She uses a step stool when she struggles to have a bowel movement or put herself in a squat position and face the toilet. She additionally has IBS flare ups which are very painful. States her doctor told her she has spasms of the large intestines. She had an anal manometry done where they told her, her anal  "sphincter was not working appropriately. She does get the urge to have a movement, but nothing will come out. She did an enema before her last appointment. The enema before that put her in the hospital and they almost had to remove her appendix (). Still, nothing much out. Just changed from linzess to trulance. The linzess made her go too frequently. She has not been able to take her medication due to school schedule. She did a food panel and has developed an allergy to wheat.     OB/GYN History:  ; 2013, she had a miscarriage.   Sexually active? Yes  Pain with vaginal exams, intercourse or tampon use? No pain, just pressure/discomfort.    Bladder/Bowel History:   Frequency of urination:   Daytime: 1-2 from morning to after school (3-4 total a day)           Nighttime: every night at 3 am   Difficulty initiating urine stream: No  Urine stream: strong  Complete emptying: Yes  Bladder leakage: No  Frequency of bowel movements: once every 7 days  Difficulty initiating BM: Yes  Quality/Shape of BM: varies from small pellets to loose stool   Does Patient Feel Empty after BM? No  Fiber Supplements or Laxative Use? Yes, started taking fiber but stopped due to start of school schedule that is hectic  Colon leakage: Yes, very rare. Maybe a handful of times over the past ten years.   Amount leaked (bowels): small amount  Form of protection: none  Number of pads required in 24 hours: not applicable    Pain:  Location: abdomen and substernal space  Current 0/10, worst 9/10, best 0/10   Description: piercing, pulsating, burning  Aggravating Factors/Activities that cause symptoms: not sure   Easing Factors: vomiting     Imaging CT abdomen:2023   "IMPRESSION:     Striated cortical enhancement of the left kidney, highly concerning for pyelonephritis. Please correlate with urinalysis. Renal infarctions could give a similar appearance, however considered less likely in this young patient without atherosclerotic " "disease."    Prior Therapy: yes, for pain with intercourse during pregnancy in 2016  Social History: lives with family   Current exercise: not as much as she would like  Occupation: Patient works as a teacher and job-related duties include not going to the bathroom enough.  Prior Level of Function: independent   Current Level of Function: independent     Types of fluid intake: She drinks lemonade and tea; not enough water . drinks red cream soda at work when things are hectic. She drinks maybe 1 liter   Diet: mostly drive through. Will eat cherries and grapes to try to have a bowel movement.   Habitus: well developed, well nourished  Abuse/Neglect: No     Patients goals: improve regular bowel movements     Medical History: Arnie  has a past medical history of Anxiety, Chest pain, Herpes simplex without mention of complication, History of Amplatzer atrial septal defect closure (2016), History of atrial septal defect (2016), Migraine, PONV (postoperative nausea and vomiting), Sickle cell trait, Vaso vagal episode, and Vasovagal syncope.     Surgical History: Arnie Browne  has a past surgical history that includes  section (,,); Tubal ligation (); Colonoscopy (); Cardiac catheterization (); Thermal ablation of endometrium using hysteroscopy (N/A, 2020); and Hysteroscopy with dilation and curettage of uterus (N/A, 2020).    Medications: Arnie has a current medication list which includes the following prescription(s): alprazolam, finasteride, fluocinonide, fluoxetine, folic acid, hibiclens, ibuprofen, lisdexamfetamine, lisdexamfetamine, [START ON 2023] lisdexamfetamine, mupirocin, trulance, plenvu, and valacyclovir.    Allergies:   Review of patient's allergies indicates:  No Known Allergies     OBJECTIVE     See EMR under MEDIA for written consent provided 2023  Chaperone: declined    ORTHO SCREEN  Posture in sitting: WNL  Posture in standing: " WNL  Pelvic alignment: no sign of deviations noted in supine   Adductor Palpation: WNL    ABDOMINAL WALL ASSESSMENT  Palpation: tender, increased tension , trigger points, and likely stool palpated along ascending, descending and sigmoid colon    Scarring:  scarring, well healed  Pelvic Floor Muscle and Transverse Abdominus Synergy: to be assessed in follow up visit  Diastasis: absent      BREATHING MECHANICS ASSESSMENT   Thorax Assessment During Quiet Respiration: Decreased excursion of abdominal wall , Decreased excursion bilaterally of lateral ribs , and Excessive excursion of sternum  Thorax Assessment During Deep Respiration: Decreased excursion of abdominal wall , Decreased excursion bilaterally of lateral ribs , and Excessive excursion of sternum    VAGINAL PELVIC FLOOR EXAM  Deferred as patient presents to initial evaluation with two daughters      Limitation/Restriction for FOTO Bowel Leakage Survey    Therapist reviewed FOTO scores for Arnie Browne on 2023.   FOTO documents entered into [employer] - see Media section.    Limitation Score: 61%       TREATMENT     Total Treatment time (time-based codes) separate from Evaluation: 33 minutes     Manual Therapy to improve flexibility, extensibility, desensitization, and bowel motility for 10 minutes including:   Colon massage and education for self massage     Therapeutic Activity to improve dynamic functional performance for 23 minutes including:     -education as described below in conjunction with installation of initial home exercise program to begin addressing deficits in functional performance required for synergistic defecation    Education provided:   general anatomy/physiology of urinary/ bowel  system and benefits of treatment were discussed with the patient. Additionally, bladder irritants, anatomy/physiology of pelvic floor, posture/body mechanices, bladder retraining, diaphragmatic breathing, proper bearing down techniques, fluid  intake/dietary modifications, behavior modifications, and colon massage were reviewed.     Written Home Exercises provided: yes.  Exercises were reviewed and Arnie was able to demonstrate them prior to the end of the session. Arnie demonstrated good  understanding of the education provided. See EMR under Patient Instructions for exercises provided during therapy sessions.    ASSESSMENT     Arnie is a 32 y.o. female referred to outpatient Physical Therapy with a medical diagnosis of Pelvic floor dysfunction [M62.89], External anal sphincter, relaxation [K62.89], Dyssynergic constipation [K59.02]. Pt presents with pelvic asymmetry, poor knowledge of body mechanics and posture, decreased pelvic muscle strength, decreased endurance of the pelvic muscles, decreased phasic ability of the pelvic muscles, increased tension of the pelvic muscles, poor quality of pelvic muscle contraction, increased nocturia, poor coordination of pelvic floor muscles during ADL's leading to urinary or fecal leakage, poor fluid intake, poor diet, dysfunctional defecation, unable to co-contract or co-relax abdominal wall and pelvic floor muscles, and poor voiding schedule.       Patient prognosis is Good.   Patient will benefit from skilled outpatient Physical Therapy to address the deficits stated above and in the chart below, provide patient/family education, and to maximize patient's level of independence.     Plan of care discussed with patient: Yes  Patient's spiritual, cultural and educational needs considered and patient is agreeable to the plan of care and goals as stated below:     Anticipated Barriers for therapy: scheduling    Medical Necessity is demonstrated by the following:  History  Co-morbidities and personal factors that may impact the plan of care [x] LOW: no personal factors / co-morbidities  [] MODERATE: 1-2 personal factors / co-morbidities  [] HIGH: 3+ personal factors / co-morbidities    Moderate / High Support  Documentation:   Co-morbidities affecting plan of care:     Personal Factors:   no deficits     Examination  Body Structures and Functions, activity limitations and participation restrictions that may impact the plan of care [x] LOW: addressing 1-2 elements  [] MODERATE: 3+ elements  [] HIGH: 4+ elements (please support below)    Moderate / High Support Documentation:      Clinical Presentation [x] LOW: stable  [] MODERATE: Evolving  [] HIGH: Unstable     Decision Making/ Complexity Score: low          Short Term Goals: 4 weeks     Pt to demonstrate proper positioning on commode with breathing techniques to decrease strain with BM to enable pt to feel empty after BM.  Pt to demonstrate independence with performing bowel massage to help with gut motility.  Pt to report being able to have a spontaneous bowel movement >/= 2 times a week to demonstrate improving gut motility and pelvic floor coordination.  Pt to be able to bulge pelvic floor which is needed for comfortable BM and complete evacuation.  Pt to report being able to have a BM without straining 30% of the time to demonstrate improving PF coordination.    Long Term Goals: 8 weeks     Pt to be discharged with home plan for carry over after discharge.   Pt to report being able to have a BM without straining 60% of the time to demonstrate improving PF coordination.  Pt to report being able to have a spontaneous bowel movement at least once every 3 days to demonstrate improving gut motility and pelvic floor coordination.     PLAN     Plan of care Certification: 8/31/2023 to 10/20/2023    Outpatient Physical Therapy 1 time(s) every  week(s) for 8 weeks to include the following interventions: Cervical/Lumbar Traction, Electrical Stimulation , Gait Training, Manual Therapy, Moist Heat/ Ice, Neuromuscular Re-ed, Orthotic Management and Training, Paraffin, Patient Education, Self Care, Therapeutic Activities, Therapeutic Exercise, Ultrasound, and dry needling.      Anayeli Chamorro, PT, DPT      I CERTIFY THE NEED FOR THESE SERVICES FURNISHED UNDER THIS PLAN OF TREATMENT AND WHILE UNDER MY CARE   Physician's comments:     Physician's Signature: ___________________________________________________ [facility]

## 2023-09-08 ENCOUNTER — HOSPITAL ENCOUNTER (EMERGENCY)
Facility: HOSPITAL | Age: 33
Discharge: HOME | End: 2023-09-08
Attending: EMERGENCY MEDICINE
Payer: COMMERCIAL

## 2023-09-08 VITALS
HEART RATE: 85 BPM | HEIGHT: 64 IN | RESPIRATION RATE: 22 BRPM | OXYGEN SATURATION: 100 % | DIASTOLIC BLOOD PRESSURE: 63 MMHG | BODY MASS INDEX: 29.88 KG/M2 | WEIGHT: 175 LBS | SYSTOLIC BLOOD PRESSURE: 112 MMHG | TEMPERATURE: 98 F

## 2023-09-08 DIAGNOSIS — R55 SYNCOPE: Primary | ICD-10-CM

## 2023-09-08 DIAGNOSIS — R07.89 CHEST TIGHTNESS: ICD-10-CM

## 2023-09-08 LAB
ALBUMIN SERPL BCP-MCNC: 4 G/DL (ref 3.5–5.2)
ALP SERPL-CCNC: 41 U/L (ref 55–135)
ALT SERPL W/O P-5'-P-CCNC: 16 U/L (ref 10–44)
ANION GAP SERPL CALC-SCNC: 5 MMOL/L (ref 8–16)
AST SERPL-CCNC: 19 U/L (ref 10–40)
B-HCG UR QL: NEGATIVE
BASOPHILS # BLD AUTO: 0.05 K/UL (ref 0–0.2)
BASOPHILS NFR BLD: 0.8 % (ref 0–1.9)
BILIRUB SERPL-MCNC: 1.3 MG/DL (ref 0.1–1)
BILIRUB UR QL STRIP: NEGATIVE
BUN SERPL-MCNC: 7 MG/DL (ref 6–20)
CALCIUM SERPL-MCNC: 9.1 MG/DL (ref 8.7–10.5)
CHLORIDE SERPL-SCNC: 108 MMOL/L (ref 95–110)
CLARITY UR: ABNORMAL
CO2 SERPL-SCNC: 25 MMOL/L (ref 23–29)
COLOR UR: YELLOW
CREAT SERPL-MCNC: 0.7 MG/DL (ref 0.5–1.4)
CTP QC/QA: YES
D DIMER PPP IA.FEU-MCNC: 0.6 MG/L FEU
DIFFERENTIAL METHOD: NORMAL
EOSINOPHIL # BLD AUTO: 0.2 K/UL (ref 0–0.5)
EOSINOPHIL NFR BLD: 2.6 % (ref 0–8)
ERYTHROCYTE [DISTWIDTH] IN BLOOD BY AUTOMATED COUNT: 14 % (ref 11.5–14.5)
EST. GFR  (NO RACE VARIABLE): >60 ML/MIN/1.73 M^2
GLUCOSE SERPL-MCNC: 85 MG/DL (ref 70–110)
GLUCOSE UR QL STRIP: NEGATIVE
HCT VFR BLD AUTO: 40 % (ref 37–48.5)
HGB BLD-MCNC: 13.8 G/DL (ref 12–16)
HGB UR QL STRIP: NEGATIVE
IMM GRANULOCYTES # BLD AUTO: 0.01 K/UL (ref 0–0.04)
IMM GRANULOCYTES NFR BLD AUTO: 0.2 % (ref 0–0.5)
KETONES UR QL STRIP: NEGATIVE
LEUKOCYTE ESTERASE UR QL STRIP: NEGATIVE
LYMPHOCYTES # BLD AUTO: 2.6 K/UL (ref 1–4.8)
LYMPHOCYTES NFR BLD: 42.8 % (ref 18–48)
MAGNESIUM SERPL-MCNC: 1.8 MG/DL (ref 1.6–2.6)
MCH RBC QN AUTO: 30.5 PG (ref 27–31)
MCHC RBC AUTO-ENTMCNC: 34.5 G/DL (ref 32–36)
MCV RBC AUTO: 88 FL (ref 82–98)
MONOCYTES # BLD AUTO: 0.8 K/UL (ref 0.3–1)
MONOCYTES NFR BLD: 12.6 % (ref 4–15)
NEUTROPHILS # BLD AUTO: 2.5 K/UL (ref 1.8–7.7)
NEUTROPHILS NFR BLD: 41 % (ref 38–73)
NITRITE UR QL STRIP: NEGATIVE
NRBC BLD-RTO: 0 /100 WBC
PH UR STRIP: 6 [PH] (ref 5–8)
PLATELET # BLD AUTO: 388 K/UL (ref 150–450)
PMV BLD AUTO: 9.2 FL (ref 9.2–12.9)
POTASSIUM SERPL-SCNC: 3.6 MMOL/L (ref 3.5–5.1)
PROT SERPL-MCNC: 7.7 G/DL (ref 6–8.4)
PROT UR QL STRIP: NEGATIVE
RBC # BLD AUTO: 4.53 M/UL (ref 4–5.4)
SODIUM SERPL-SCNC: 138 MMOL/L (ref 136–145)
SP GR UR STRIP: 1.01 (ref 1–1.03)
TROPONIN I SERPL HS-MCNC: 2.7 PG/ML (ref 0–14.9)
TROPONIN I SERPL HS-MCNC: 3.1 PG/ML (ref 0–14.9)
URN SPEC COLLECT METH UR: ABNORMAL
UROBILINOGEN UR STRIP-ACNC: NEGATIVE EU/DL
WBC # BLD AUTO: 6.1 K/UL (ref 3.9–12.7)

## 2023-09-08 PROCEDURE — 85025 COMPLETE CBC W/AUTO DIFF WBC: CPT | Performed by: EMERGENCY MEDICINE

## 2023-09-08 PROCEDURE — 84484 ASSAY OF TROPONIN QUANT: CPT | Performed by: EMERGENCY MEDICINE

## 2023-09-08 PROCEDURE — 83735 ASSAY OF MAGNESIUM: CPT | Performed by: EMERGENCY MEDICINE

## 2023-09-08 PROCEDURE — 96361 HYDRATE IV INFUSION ADD-ON: CPT

## 2023-09-08 PROCEDURE — 25500020 PHARM REV CODE 255: Performed by: EMERGENCY MEDICINE

## 2023-09-08 PROCEDURE — 99285 EMERGENCY DEPT VISIT HI MDM: CPT | Mod: 25

## 2023-09-08 PROCEDURE — 85379 FIBRIN DEGRADATION QUANT: CPT | Performed by: EMERGENCY MEDICINE

## 2023-09-08 PROCEDURE — 63600175 PHARM REV CODE 636 W HCPCS: Performed by: EMERGENCY MEDICINE

## 2023-09-08 PROCEDURE — 93005 ELECTROCARDIOGRAM TRACING: CPT | Performed by: GENERAL PRACTICE

## 2023-09-08 PROCEDURE — 93010 EKG 12-LEAD: ICD-10-PCS | Mod: ,,, | Performed by: GENERAL PRACTICE

## 2023-09-08 PROCEDURE — 81003 URINALYSIS AUTO W/O SCOPE: CPT | Performed by: EMERGENCY MEDICINE

## 2023-09-08 PROCEDURE — 93010 ELECTROCARDIOGRAM REPORT: CPT | Mod: ,,, | Performed by: GENERAL PRACTICE

## 2023-09-08 PROCEDURE — 96360 HYDRATION IV INFUSION INIT: CPT

## 2023-09-08 PROCEDURE — 81025 URINE PREGNANCY TEST: CPT | Performed by: EMERGENCY MEDICINE

## 2023-09-08 PROCEDURE — 80053 COMPREHEN METABOLIC PANEL: CPT | Performed by: EMERGENCY MEDICINE

## 2023-09-08 RX ORDER — IODIXANOL 320 MG/ML
100 INJECTION, SOLUTION INTRAVASCULAR
Status: COMPLETED | OUTPATIENT
Start: 2023-09-08 | End: 2023-09-08

## 2023-09-08 RX ADMIN — SODIUM CHLORIDE, SODIUM LACTATE, POTASSIUM CHLORIDE, AND CALCIUM CHLORIDE 1000 ML: .6; .31; .03; .02 INJECTION, SOLUTION INTRAVENOUS at 12:09

## 2023-09-08 RX ADMIN — IODIXANOL 100 ML: 320 INJECTION, SOLUTION INTRAVASCULAR at 03:09

## 2023-09-08 NOTE — DISCHARGE INSTRUCTIONS
RETURN TO EMERGENCY DEPARTMENT WITHOUT FAIL, IF YOUR SYMPTOMS WORSEN, IF YOU GET NEW OR DIFFERENT SYMPTOMS, IF YOU ARE UNABLE TO FOLLOW UP AS DIRECTED, OR IF YOU HAVE ANY CONCERNS OR WORRIES.    Increase your fluid intake.  Follow-up with primary care provider on an outpatient basis.

## 2023-09-08 NOTE — ED PROVIDER NOTES
Encounter Date: 2023       History     Chief Complaint   Patient presents with    Loss of Consciousness     Hx of syncopal episodes, does not think she hit her head.      32-year-old female with no significant past medical history presents emergency department with syncope and chest pain.  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  She says that she called her  and she came to help her out of the classroom.  Said that when she went out the classroom she had a syncopal episode.  She passed out.  Did not have any seizure activity.  No bowel or bladder incontinence.  No vomiting.  Patient is not on any birth control.  She is never had a DVT or PE in the past.  Patient says that she is feeling somewhat better now but still having some mild central chest tightness, nonradiating, not associated with any shortness of breath or any diaphoresis or any vomiting.      Review of patient's allergies indicates:  No Known Allergies  Past Medical History:   Diagnosis Date    Anxiety     Chest pain     Herpes simplex without mention of complication     History of Amplatzer atrial septal defect closure 2016    History of atrial septal defect 2016    Migraine     PONV (postoperative nausea and vomiting)     Sickle cell trait     Vaso vagal episode     Vasovagal syncope     last      Past Surgical History:   Procedure Laterality Date    CARDIAC CATHETERIZATION  2006    ASD closure     SECTION  ,,2013    COLONOSCOPY  2014    HYSTEROSCOPY WITH DILATION AND CURETTAGE OF UTERUS N/A 2020    Procedure: HYSTEROSCOPY, WITH DILATION AND CURETTAGE OF UTERUS;  Surgeon: Julissa Munoz MD;  Location: [facility];  Service: OB/GYN;  Laterality: N/A;    THERMAL ABLATION OF ENDOMETRIUM USING HYSTEROSCOPY N/A 2020    Procedure: ABLATION, ENDOMETRIUM, THERMAL, HYSTEROSCOPIC - Novasure;  Surgeon: Julissa Munoz MD;   Location: [facility];  Service: OB/GYN;  Laterality: N/A;    TUBAL LIGATION  2016     Family History   Problem Relation Age of Onset    Arthritis Father     Hypertension Father     Stroke Father     Heart disease Father     No Known Problems Mother     Asthma Sister     Asthma Daughter     Cancer Paternal Grandmother         breast and bone cancer    Cancer Paternal Grandfather         Colon cancer    Diabetes Paternal Grandfather     No Known Problems Daughter     No Known Problems Daughter      Social History     Tobacco Use    Smoking status: Never    Smokeless tobacco: Never   Substance Use Topics    Alcohol use: No    Drug use: No     Review of Systems   Constitutional:  Negative for chills and fever.   HENT:  Negative for sore throat.    Respiratory:  Positive for chest tightness. Negative for shortness of breath.    Cardiovascular:  Positive for chest pain. Negative for palpitations and leg swelling.   Gastrointestinal:  Negative for abdominal pain, diarrhea, nausea and vomiting.   Genitourinary:  Negative for dysuria and flank pain.   Musculoskeletal:  Negative for back pain.   Skin:  Negative for rash.   Neurological:  Positive for syncope. Negative for weakness.   Hematological:  Does not bruise/bleed easily.   All other systems reviewed and are negative.      Physical Exam     Initial Vitals [09/08/23 1104]   BP Pulse Resp Temp SpO2   113/83 60 18 98.5 °F (36.9 °C) 97 %      MAP       --         Physical Exam    Nursing note and vitals reviewed.  Constitutional: She appears well-developed and well-nourished. No distress.   HENT:   Head: Normocephalic and atraumatic.   Mouth/Throat: No oropharyngeal exudate.   Eyes: Conjunctivae and EOM are normal. Pupils are equal, round, and reactive to light.   Neck: Neck supple. No tracheal deviation present.   Cardiovascular:  Normal rate, regular rhythm, normal heart sounds and intact distal pulses.           No murmur heard.  Pulmonary/Chest: Breath sounds normal. No  stridor. No respiratory distress. She has no wheezes. She has no rhonchi. She has no rales.   Abdominal: Abdomen is soft. She exhibits no distension. There is no abdominal tenderness. There is no rebound and no guarding.   Musculoskeletal:         General: No tenderness or edema. Normal range of motion.      Cervical back: Neck supple.     Neurological: She is alert and oriented to person, place, and time. She has normal strength. No cranial nerve deficit or sensory deficit.   Speech is normal.  No facial droop.  5/5 strength in upper and lower extremities bilaterally.  Normal finger-to-nose.   Skin: Skin is warm and dry. Capillary refill takes less than 2 seconds. No rash noted. No erythema. No pallor.   Psychiatric: She has a normal mood and affect. Thought content normal.         ED Course   Procedures  Labs Reviewed   COMPREHENSIVE METABOLIC PANEL - Abnormal; Notable for the following components:       Result Value    Total Bilirubin 1.3 (*)     Alkaline Phosphatase 41 (*)     Anion Gap 5 (*)     All other components within normal limits   D DIMER, QUANTITATIVE - Abnormal; Notable for the following components:    D-Dimer 0.60 (*)     All other components within normal limits    Narrative:        DDIMR critical result(s) called and verbal readback obtained from   ARJUN MANSFIELD RN  by DAP9 09/08/2023 13:18   URINALYSIS, REFLEX TO URINE CULTURE - Abnormal; Notable for the following components:    Appearance, UA Hazy (*)     All other components within normal limits    Narrative:     Specimen Source->Urine   CBC W/ AUTO DIFFERENTIAL   MAGNESIUM   TROPONIN I HIGH SENSITIVITY   TROPONIN I HIGH SENSITIVITY   POCT URINE PREGNANCY        ECG Results              EKG 12-lead (In process)  Result time 09/08/23 13:14:39      In process by Interface, Lab In [facility] (09/08/23 13:14:39)                   Narrative:    Test Reason : R55,    Vent. Rate : 061 BPM     Atrial Rate : 061 BPM     P-R Int : 146 ms          QRS Dur :  078 ms      QT Int : 410 ms       P-R-T Axes : 056 048 020 degrees     QTc Int : 412 ms    Normal sinus rhythm with sinus arrhythmia  Nonspecific T wave abnormality  Abnormal ECG  When compared with ECG of 25-OCT-2022 20:12,  No significant change was found    Referred By: [email or web address]   SELF           Confirmed By:                                   Results for orders placed or performed during the hospital encounter of 09/08/23   CBC auto differential   Result Value Ref Range    WBC 6.10 3.90 - 12.70 K/uL    RBC 4.53 4.00 - 5.40 M/uL    Hemoglobin 13.8 12.0 - 16.0 g/dL    Hematocrit 40.0 37.0 - 48.5 %    MCV 88 82 - 98 fL    MCH 30.5 27.0 - 31.0 pg    MCHC 34.5 32.0 - 36.0 g/dL    RDW 14.0 11.5 - 14.5 %    Platelets 388 150 - 450 K/uL    MPV 9.2 9.2 - 12.9 fL    Immature Granulocytes 0.2 0.0 - 0.5 %    Gran # (ANC) 2.5 1.8 - 7.7 K/uL    Immature Grans (Abs) 0.01 0.00 - 0.04 K/uL    Lymph # 2.6 1.0 - 4.8 K/uL    Mono # 0.8 0.3 - 1.0 K/uL    Eos # 0.2 0.0 - 0.5 K/uL    Baso # 0.05 0.00 - 0.20 K/uL    nRBC 0 0 /100 WBC    Gran % 41.0 38.0 - 73.0 %    Lymph % 42.8 18.0 - 48.0 %    Mono % 12.6 4.0 - 15.0 %    Eosinophil % 2.6 0.0 - 8.0 %    Basophil % 0.8 0.0 - 1.9 %    Differential Method Automated    Comprehensive metabolic panel   Result Value Ref Range    Sodium 138 136 - 145 mmol/L    Potassium 3.6 3.5 - 5.1 mmol/L    Chloride 108 95 - 110 mmol/L    CO2 25 23 - 29 mmol/L    Glucose 85 70 - 110 mg/dL    BUN 7 6 - 20 mg/dL    Creatinine 0.7 0.5 - 1.4 mg/dL    Calcium 9.1 8.7 - 10.5 mg/dL    Total Protein 7.7 6.0 - 8.4 g/dL    Albumin 4.0 3.5 - 5.2 g/dL    Total Bilirubin 1.3 (H) 0.1 - 1.0 mg/dL    Alkaline Phosphatase 41 (L) 55 - 135 U/L    AST 19 10 - 40 U/L    ALT 16 10 - 44 U/L    eGFR >60.0 >60 mL/min/1.73 m^2    Anion Gap 5 (L) 8 - 16 mmol/L   D dimer, quantitative   Result Value Ref Range    D-Dimer 0.60 (HH) <0.50 mg/L FEU   Magnesium   Result Value Ref Range    Magnesium 1.8 1.6 - 2.6 mg/dL   Troponin I High  Sensitivity   Result Value Ref Range    Troponin I High Sensitivity 2.7 0.0 - 14.9 pg/mL   Urinalysis, Reflex to Urine Culture Urine, Clean Catch    Specimen: Urine, Clean Catch   Result Value Ref Range    Specimen UA Urine, Clean Catch     Color, UA Yellow Yellow, Straw, Bonnie    Appearance, UA Hazy (A) Clear    pH, UA 6.0 5.0 - 8.0    Specific Gravity, UA 1.010 1.005 - 1.030    Protein, UA Negative Negative    Glucose, UA Negative Negative    Ketones, UA Negative Negative    Bilirubin (UA) Negative Negative    Occult Blood UA Negative Negative    Nitrite, UA Negative Negative    Urobilinogen, UA Negative Negative EU/dL    Leukocytes, UA Negative Negative   TROPONIN I HIGH SENSITIVITY   Result Value Ref Range    Troponin I High Sensitivity 3.1 0.0 - 14.9 pg/mL   POCT urine pregnancy   Result Value Ref Range    POC Preg Test, Ur Negative Negative     Acceptable Yes      .edii    Imaging Results              CTA Chest Non-Coronary (PE Studies) (Final result)  Result time 09/08/23 15:59:18      Final result by Nirav Larkin Jr., MD (09/08/23 15:59:18)                   Narrative:    EXAMINATION:  CTA CHEST NON CORONARY (PE STUDIES)    CLINICAL INDICATION: Female, 32 years old. Pulmonary embolism (PE) suspected, positive D-dimer    TECHNIQUE: CTA of the chest was performed with IV contrast in the pulmonary arterial phase. Multiplanar, MIP, 2D, and 3D reconstruction images were performed and provided for review.    CONTRAST: 100 mL of Omnipaque 350 IV.    COMPARISON: None    FINDINGS:  Examination is of diagnostic quality as there is normal opacification of the pulmonary arterial tree out to the tertiary order branch vessels without evidence of webbing, truncation, or pruning of the pulmonary arterial system. No evidence of thromboembolic phenomenon established. The mediastinal structures are unremarkable, although there is minimal residual thymic tissue within the prevascular space. The cardiac  chambers are normal in size and configuration, without evidence of significant pericardial fluid. No mass, pleural effusion, or pulmonary infiltrate. Tracheobronchial tree appears normal. The thoracic aorta tapers normally. The upper abdominal cavity appears unremarkable with CT appearance. Upper dominant cavity is normal.Normal adrenals.    IMPRESSION:  1. No evidence of acute pulmonary thromboembolism.    Electronically signed by:  Nirav Larkin MD  9/8/2023 3:59 PM CDT Workstation: [id number]                                     X-Ray Chest AP Portable (Final result)  Result time 09/08/23 12:25:33   Procedure changed from X-Ray Chest 1 View     Final result by Arlet Mccoy MD (09/08/23 12:25:33)                   Narrative:    XR CHEST 1 VIEW    CLINICAL HISTORY:  32 years Female syncope    COMPARISON: 4/13/2023    FINDINGS: Cardiomediastinal silhouette is within normal limits. Lungs are normally expanded with no airspace consolidation. No pleural effusion or pneumothorax. No acute osseous abnormality.    IMPRESSION: No acute pulmonary process.    Electronically signed by:  Arlet Mccoy MD  9/8/2023 12:25 PM CDT Workstation: [id number]                                     Medications   lactated ringers bolus 1,000 mL (0 mLs Intravenous Stopped 9/8/23 1421)   iodixanoL (VISIPAQUE 320) injection 100 mL (100 mLs Intravenous Given 9/8/23 1533)     Medical Decision Making  Emergent evaluation of a 32-year-old female presents emergency department syncope.  Patient is a well appearing female she is not tachycardic not hypotensive afebrile.  Patient considerations include but not limited to he but patient had a CT scan in the emergency department with no evidence of /PE.  No evidence of pneumonia, pneumothorax.  I doubt any pericardial effusion/tamponade.  Patient also considered for ACS but EKG nonischemic, 2 troponins are negative.  Low risk heart score.  Considered electrolyte abnormality but electrolytes  within normal limits.  Patient could have vasovagal syncope possibility he could have anxiety/panic attack as a cause of her symptoms as well.  Patient will follow up with PCP on an outpatient basis.  She will return if symptoms change or worsen.    A dictation software program was used for this note.  Please expect some simple typographical  errors in this note.    I had a detailed discussion with the patient and/or guardian regarding: The historical points, exam findings, and diagnostic results supporting the discharge diagnosis, lab results, pertinent radiology results, and the need for outpatient follow-up, for definitive care with a family practitioner and to return to the emergency department if symptoms worsen or persist or if there are any questions or concerns that arise at home. All questions have been answered in detail. Strict return to Emergency Department precautions have been provided.       Amount and/or Complexity of Data Reviewed  Labs: ordered.  Radiology: ordered.    Risk  Prescription drug management.               ED Course as of 09/08/23 2013   Fri Sep 08, 2023   1346 EKG 12:05 p.m. normal sinus rhythm rate of 61.  Nonspecific T-wave changes.  Sinus arrhythmia noted.  No STEMI.  EKG interpreted independently by me.   [JR]      ED Course User Index  [JR] Sujit Cordova DO                    Clinical Impression:   Final diagnoses:  [R55] Syncope (Primary)  [R07.89] Chest tightness        ED Disposition Condition    Discharge Stable          ED Prescriptions    None       Follow-up Information       Follow up With Specialties Details Why Contact Info Additional Information    Amparo Monroe, APRN,FNP-C Family Medicine In 3 days  [address] [address]  [phone]       [facility] - Emergency Dept Emergency Medicine  If symptoms worsen [address] [facility] [address]  [phone] 1st floor             Sujit Cordova DO  09/08/23 2013

## 2023-09-08 NOTE — ED NOTES
"Pt was walking down the monae when she "passed out and fell". Pt denies hitting her head and denies anticoagulant use but does report LOC. No distress noted at this time.  "

## 2023-09-20 NOTE — PROGRESS NOTES
Outpatient Pelvic Health Physical Therapy Daily Note       Patient Name: Arnie Browne  [facility] Number: [id number]    Therapy Diagnosis:   Encounter Diagnoses   Name Primary?    Pelvic floor dysfunction in female Yes    Dyssynergic defecation      Physician: Natalie Jimenez NP    Visit Date: 9/21/2023    Physician Orders: PT Eval and Treat   Medical Diagnosis from Referral: Pelvic floor dysfunction [M62.89], External anal sphincter, relaxation [K62.89], Dyssynergic constipation [K59.02]  Evaluation Date: 8/31/2023  Authorization Period Expiration: 12/31/2023  Plan of Care Expiration: 10/20/2023  Progress Note Due: 09/28/2023  Visit # / Visits authorized: 1/ 20  FOTO: Issued Visit #: 1/3      Precautions: Standard     Time In: 4:30 pm  Time Out: 5:26 pm  Total Appointment Time (timed & untimed codes): 56 minutes    SUBJECTIVE     Pt reports: she had some pain last night prior to defecation in which she went to the bathroom three times. She has pain on the right side with intercourse and has had a very rough month as her body has gone through a lot.     She was compliant with home exercise program.  Response to previous treatment: no adverse response  Functional change: initial follow up    Pain: 0/10 on VAS scale  Pain location: abdomen    OBJECTIVE     See EMR under MEDIA for written consent provided 8/31/2023  Chaperone: declined    VAGINAL PELVIC FLOOR EXAM    EXTERNAL ASSESSMENT  Introitus: WNL  Skin condition: WNL  Scarring: none   Sensation: WNL   Pain: none  Voluntary contraction: visible lift  Voluntary relaxation: visible drop  Involuntary contraction: nil  Bearing down: visible drop  Perineal descent: absent      INTERNAL ASSESSMENT  Pain: trigger points as follows: throughout deepest layer of pelvic floor on the right    Sensation: able to localized pressure appropriately   Vaginal vault: WNL  Muscle Bulk: areas of hypertonus   Muscle Power: 2/5  Muscle Endurance: 6 sec  # Reps To Fatigue: Not assessed  today    Fast Contractions in 10 seconds: not assessed today     Quality of contraction: incomplete relaxation   Specificity: WNL   Coordination: tends to hold breath during PFM contration   Prolapse check: none     Therapeutic Exercise to develop  strength, endurance, ROM, flexibility, posture, and core stabilization for 0 minutes including:       Neuromuscular Re-education to develop coordination, posture, and down training for 8 minutes including:  bearing down with abdominal release and diaphragmatic breathing    Manual Therapy to develop flexibility, extensibility, and desensitization for 38 minutes including:    -vaginal assessment as noted above  - bilateral pelvic floor with emphasis on right layer 3; gentle pressure due to sensitivity around areas of noted trigger points    Arnie participated in dynamic functional therapeutic activities to improve functional performance for 8  minutes, including:    -Pt  re-educated on anatomy of pelvic floor. Therapist and patient reviewed pelvic wand and lubricant for future use        Education provided:   general anatomy/physiology of urinary/ bowel  system and benefits of treatment were discussed with the patient. Additionally, bladder irritants, anatomy/physiology of pelvic floor, posture/body mechanices, bladder retraining, diaphragmatic breathing, proper bearing down techniques, fluid intake/dietary modifications, behavior modifications, and colon massage were reviewed.   - Patient provided with pamphlet for pelvic wand to be ordered for future use     Written Home Exercises provided: yes.  Exercises were reviewed and Arnie was able to demonstrate them prior to the end of the session. Rubinruslaneli demonstrated good  understanding of the education provided. See EMR under Patient Instructions for exercises provided during therapy sessions.    ASSESSMENT      Pt tolerated entire treatment well with no visible adverse effects. Noted trigger points accompanied by  tenderness on right side of pelvic floor. Increased tone on left without any pain. Patient with good muscle coordination as she is able to contract and bear down well voluntarily following verbal cues. Discussed ordering pelvic wand for increased frequency of down training and desensitization. Overall, patient with good response to initial treatment and expected to continue doing well as therapy progresses.    Will the patient continue to benefit from skilled PT intervention? Yes  Medical necessity demonstrated by: continued dyssynergic defecation  Progress towards goals:  good    Short Term Goals: 4 weeks  (PROGRESSING 09/21/2023)     Pt to demonstrate proper positioning on commode with breathing techniques to decrease strain with BM to enable pt to feel empty after BM.  Pt to demonstrate independence with performing bowel massage to help with gut motility.  Pt to report being able to have a spontaneous bowel movement >/= 2 times a week to demonstrate improving gut motility and pelvic floor coordination.  Pt to be able to bulge pelvic floor which is needed for comfortable BM and complete evacuation.  Pt to report being able to have a BM without straining 30% of the time to demonstrate improving PF coordination.     Long Term Goals: 8 weeks (PROGRESSING 09/21/2023)     Pt to be discharged with home plan for carry over after discharge.   Pt to report being able to have a BM without straining 60% of the time to demonstrate improving PF coordination.  Pt to report being able to have a spontaneous bowel movement at least once every 3 days to demonstrate improving gut motility and pelvic floor coordination.     PLAN     Plan of care Certification: 8/31/2023 to 10/20/2023   Continue with established Plan of Care, working toward established PT goals.     Anayeli Chamorro, PT, DPT  09/21/2023

## 2023-09-21 ENCOUNTER — CLINICAL SUPPORT (OUTPATIENT)
Dept: REHABILITATION | Facility: HOSPITAL | Age: 33
End: 2023-09-21
Payer: COMMERCIAL

## 2023-09-21 DIAGNOSIS — M62.89 PELVIC FLOOR DYSFUNCTION IN FEMALE: Primary | ICD-10-CM

## 2023-09-21 DIAGNOSIS — K59.02 DYSSYNERGIC DEFECATION: ICD-10-CM

## 2023-09-21 PROCEDURE — 97530 THERAPEUTIC ACTIVITIES: CPT | Mod: PN

## 2023-09-21 PROCEDURE — 97112 NEUROMUSCULAR REEDUCATION: CPT | Mod: PN

## 2023-09-21 PROCEDURE — 97140 MANUAL THERAPY 1/> REGIONS: CPT | Mod: PN

## 2023-09-27 ENCOUNTER — PATIENT MESSAGE (OUTPATIENT)
Dept: FAMILY MEDICINE | Facility: CLINIC | Age: 33
End: 2023-09-27

## 2023-10-03 ENCOUNTER — DOCUMENTATION ONLY (OUTPATIENT)
Dept: REHABILITATION | Facility: HOSPITAL | Age: 33
End: 2023-10-03
Payer: COMMERCIAL

## 2023-10-09 ENCOUNTER — OFFICE VISIT (OUTPATIENT)
Dept: DERMATOLOGY | Facility: CLINIC | Age: 33
End: 2023-10-09
Payer: COMMERCIAL

## 2023-10-09 DIAGNOSIS — L66.9 CICATRICIAL ALOPECIA: ICD-10-CM

## 2023-10-09 DIAGNOSIS — L65.9 ALOPECIA: ICD-10-CM

## 2023-10-09 DIAGNOSIS — L64.9 ANDROGENIC ALOPECIA: Primary | ICD-10-CM

## 2023-10-09 PROCEDURE — 1159F PR MEDICATION LIST DOCUMENTED IN MEDICAL RECORD: ICD-10-PCS | Mod: CPTII,S$GLB,, | Performed by: STUDENT IN AN ORGANIZED HEALTH CARE EDUCATION/TRAINING PROGRAM

## 2023-10-09 PROCEDURE — 1159F MED LIST DOCD IN RCRD: CPT | Mod: CPTII,S$GLB,, | Performed by: STUDENT IN AN ORGANIZED HEALTH CARE EDUCATION/TRAINING PROGRAM

## 2023-10-09 PROCEDURE — 1160F RVW MEDS BY RX/DR IN RCRD: CPT | Mod: CPTII,S$GLB,, | Performed by: STUDENT IN AN ORGANIZED HEALTH CARE EDUCATION/TRAINING PROGRAM

## 2023-10-09 PROCEDURE — 99213 OFFICE O/P EST LOW 20 MIN: CPT | Mod: S$GLB,,, | Performed by: STUDENT IN AN ORGANIZED HEALTH CARE EDUCATION/TRAINING PROGRAM

## 2023-10-09 PROCEDURE — 99213 PR OFFICE/OUTPT VISIT, EST, LEVL III, 20-29 MIN: ICD-10-PCS | Mod: S$GLB,,, | Performed by: STUDENT IN AN ORGANIZED HEALTH CARE EDUCATION/TRAINING PROGRAM

## 2023-10-09 PROCEDURE — 1160F PR REVIEW ALL MEDS BY PRESCRIBER/CLIN PHARMACIST DOCUMENTED: ICD-10-PCS | Mod: CPTII,S$GLB,, | Performed by: STUDENT IN AN ORGANIZED HEALTH CARE EDUCATION/TRAINING PROGRAM

## 2023-10-09 RX ORDER — MINOXIDIL 2 %
SOLUTION, NON-ORAL TOPICAL 2 TIMES DAILY
Qty: 60 ML | Refills: 6 | Status: SHIPPED | OUTPATIENT
Start: 2023-10-09

## 2023-10-09 RX ORDER — FLUOCINONIDE TOPICAL SOLUTION USP, 0.05% 0.5 MG/ML
SOLUTION TOPICAL 2 TIMES DAILY
Qty: 60 ML | Refills: 1 | Status: SHIPPED | OUTPATIENT
Start: 2023-10-09

## 2023-10-09 RX ORDER — FINASTERIDE 5 MG/1
5 TABLET, FILM COATED ORAL DAILY
Qty: 30 TABLET | Refills: 11 | Status: SHIPPED | OUTPATIENT
Start: 2023-10-09 | End: 2024-10-08

## 2023-10-09 RX ORDER — MINOXIDIL 2 %
SOLUTION, NON-ORAL TOPICAL 2 TIMES DAILY
COMMUNITY
End: 2023-10-09 | Stop reason: SDUPTHER

## 2023-10-09 NOTE — PROGRESS NOTES
Subjective:      Patient ID:  Arnie Browne is a 33 y.o. female who presents for   Chief Complaint   Patient presents with    Hair/Scalp Problem     Follow up     LOV 02/07/2023    Patient coming in today for a follow up on hair loss. States that she is not strict on her regimen but she has notice a difference when she is doing the lidex solution once day and rogaine 5% foam.  States she has not used Proscar for about a month due to GI problems (not related to the medication)- she stopped all her medications when this happened. Does plan to start back on it.   States that her stylist does see hair growth.     Final Pathologic Diagnosis     Skin, scalp, biopsy:   -Cicatricial Alopecia, see note   Note: Given the clinical differential,  cicatricial alopecia seems most   appropriate in this context.  The findings are nonspecfic. There are some   viable hair follicles present;  however the majority are  minaturized with an   increased amount of sebaceous glands. This represents scarring and androgenic   alopecia both occurring concurrently.      Not planning for pregnancy, had ablation and tubal ligation.      Review of Systems   Constitutional:  Negative for fever and chills.   Respiratory:  Negative for cough and shortness of breath.    Gastrointestinal:  Negative for nausea and vomiting.       Objective:   Physical Exam   Constitutional: She appears well-developed and well-nourished.   Neurological: She is alert and oriented to person, place, and time.   Psychiatric: She has a normal mood and affect.   Skin:   Areas Examined (abnormalities noted in diagram):   Scalp / Hair Palpated and Inspected            Diagram Legend     Erythematous scaling macule/papule c/w actinic keratosis       Vascular papule c/w angioma      Pigmented verrucoid papule/plaque c/w seborrheic keratosis      Yellow umbilicated papule c/w sebaceous hyperplasia      Irregularly shaped tan macule c/w lentigo     1-2 mm smooth white papules  consistent with Milia      Movable subcutaneous cyst with punctum c/w epidermal inclusion cyst      Subcutaneous movable cyst c/w pilar cyst      Firm pink to brown papule c/w dermatofibroma      Pedunculated fleshy papule(s) c/w skin tag(s)      Evenly pigmented macule c/w junctional nevus     Mildly variegated pigmented, slightly irregular-bordered macule c/w mildly atypical nevus      Flesh colored to evenly pigmented papule c/w intradermal nevus       Pink pearly papule/plaque c/w basal cell carcinoma      Erythematous hyperkeratotic cursted plaque c/w SCC      Surgical scar with no sign of skin cancer recurrence      Open and closed comedones      Inflammatory papules and pustules      Verrucoid papule consistent consistent with wart     Erythematous eczematous patches and plaques     Dystrophic onycholytic nail with subungual debris c/w onychomycosis     Umbilicated papule    Erythematous-base heme-crusted tan verrucoid plaque consistent with inflamed seborrheic keratosis     Erythematous Silvery Scaling Plaque c/w Psoriasis     See annotation                      Today            Assessment / Plan:        Androgenic alopecia- improving!  Alopecia  Cicatricial alopecia  -     minoxidiL (ROGAINE) 2 % external solution; Apply topically 2 (two) times daily.  Dispense: 60 mL; Refill: 6  -     finasteride (PROSCAR) 5 mg tablet; Take 1 tablet (5 mg total) by mouth once daily.  Dispense: 30 tablet; Refill: 11  -     fluocinonide (LIDEX) 0.05 % external solution; Apply topically 2 (two) times daily.  Dispense: 60 mL; Refill: 1  Has not been taking finasteride- plans to restart- do not donate blood, had ablation and tubal ligation  - instructed patient to apply OTC minoxidil 5% solution or  5% foam twice daily. Side effects may include dryness, irritation, and rare ACD.  Finasteride is not approved by the FDA for use in women, however multiple studies have shown benefit when used to treat female pattern hair loss in  combination with topical minoxidil. Side effects are infrequent, usually mild, and reversible even with maintenance of treatment. They include decreased libido, breast tenderness, and headache. In premenopausal women, irregular menstruation and spotting may occur. Pregnancy must be avoided while on this medication (risk of feminization of the male fetus)              6 months  No follow-ups on file.

## 2023-11-22 ENCOUNTER — TELEPHONE (OUTPATIENT)
Dept: FAMILY MEDICINE | Facility: CLINIC | Age: 33
End: 2023-11-22

## 2023-11-22 DIAGNOSIS — R30.0 DYSURIA: Primary | ICD-10-CM

## 2023-11-22 RX ORDER — PHENAZOPYRIDINE HYDROCHLORIDE 100 MG/1
100 TABLET, FILM COATED ORAL 3 TIMES DAILY PRN
Qty: 30 TABLET | Refills: 0 | Status: SHIPPED | OUTPATIENT
Start: 2023-11-22 | End: 2023-12-02

## 2023-11-22 RX ORDER — NITROFURANTOIN 25; 75 MG/1; MG/1
100 CAPSULE ORAL 2 TIMES DAILY
Qty: 20 CAPSULE | Refills: 0 | Status: SHIPPED | OUTPATIENT
Start: 2023-11-22

## 2023-11-22 NOTE — TELEPHONE ENCOUNTER
Pt notified she needs to stay well hydrated and watch her caffeine intake and an antibiotic has been sent to her pharmacy. Pt verbalized understanding.

## 2023-11-22 NOTE — TELEPHONE ENCOUNTER
Patient thinks she may possibly have a UTI.    She has had the flu since last Thursday. She was given Tamiflu- took it for a few days, but lost it because she was in the process of moving.    Yesterday was the 1st day she was fever free.  She has low back pain and urinary frequency, states her urine is dark yellow.    Please advise

## 2024-04-02 ENCOUNTER — OFFICE VISIT (OUTPATIENT)
Dept: FAMILY MEDICINE | Facility: CLINIC | Age: 34
End: 2024-04-02
Payer: COMMERCIAL

## 2024-04-02 VITALS
BODY MASS INDEX: 33.12 KG/M2 | DIASTOLIC BLOOD PRESSURE: 72 MMHG | OXYGEN SATURATION: 98 % | SYSTOLIC BLOOD PRESSURE: 116 MMHG | WEIGHT: 194 LBS | HEART RATE: 72 BPM | HEIGHT: 64 IN

## 2024-04-02 DIAGNOSIS — Z00.00 ROUTINE HEALTH MAINTENANCE: Primary | ICD-10-CM

## 2024-04-02 DIAGNOSIS — R63.5 WEIGHT GAIN: Primary | ICD-10-CM

## 2024-04-02 DIAGNOSIS — R41.840 ATTENTION AND CONCENTRATION DEFICIT: ICD-10-CM

## 2024-04-02 PROCEDURE — 1159F MED LIST DOCD IN RCRD: CPT | Mod: CPTII,S$GLB,, | Performed by: NURSE PRACTITIONER

## 2024-04-02 PROCEDURE — 99395 PREV VISIT EST AGE 18-39: CPT | Mod: S$GLB,,, | Performed by: NURSE PRACTITIONER

## 2024-04-02 PROCEDURE — 1160F RVW MEDS BY RX/DR IN RCRD: CPT | Mod: CPTII,S$GLB,, | Performed by: NURSE PRACTITIONER

## 2024-04-02 PROCEDURE — 99999 PR PBB SHADOW E&M-EST. PATIENT-LVL IV: CPT | Mod: PBBFAC,,, | Performed by: NURSE PRACTITIONER

## 2024-04-02 PROCEDURE — 3074F SYST BP LT 130 MM HG: CPT | Mod: CPTII,S$GLB,, | Performed by: NURSE PRACTITIONER

## 2024-04-02 PROCEDURE — 3078F DIAST BP <80 MM HG: CPT | Mod: CPTII,S$GLB,, | Performed by: NURSE PRACTITIONER

## 2024-04-02 PROCEDURE — 3008F BODY MASS INDEX DOCD: CPT | Mod: CPTII,S$GLB,, | Performed by: NURSE PRACTITIONER

## 2024-04-02 RX ORDER — LISDEXAMFETAMINE DIMESYLATE CAPSULES 20 MG/1
20 CAPSULE ORAL EVERY MORNING
Qty: 30 CAPSULE | Refills: 0 | Status: SHIPPED | OUTPATIENT
Start: 2024-06-01

## 2024-04-02 RX ORDER — OMEPRAZOLE 40 MG/1
40 CAPSULE, DELAYED RELEASE ORAL EVERY MORNING
COMMUNITY

## 2024-04-02 RX ORDER — BUPROPION HYDROCHLORIDE 300 MG/1
300 TABLET ORAL DAILY
Qty: 30 TABLET | Refills: 5 | Status: SHIPPED | OUTPATIENT
Start: 2024-04-02

## 2024-04-02 RX ORDER — LISDEXAMFETAMINE DIMESYLATE CAPSULES 20 MG/1
20 CAPSULE ORAL EVERY MORNING
Qty: 30 CAPSULE | Refills: 0 | Status: SHIPPED | OUTPATIENT
Start: 2024-05-02

## 2024-04-02 RX ORDER — LISDEXAMFETAMINE DIMESYLATE CAPSULES 20 MG/1
20 CAPSULE ORAL EVERY MORNING
Qty: 30 CAPSULE | Refills: 0 | Status: SHIPPED | OUTPATIENT
Start: 2024-04-02

## 2024-04-04 ENCOUNTER — OFFICE VISIT (OUTPATIENT)
Dept: DERMATOLOGY | Facility: CLINIC | Age: 34
End: 2024-04-04
Payer: COMMERCIAL

## 2024-04-04 VITALS — WEIGHT: 194 LBS | HEIGHT: 64 IN | BODY MASS INDEX: 33.12 KG/M2

## 2024-04-04 DIAGNOSIS — L66.9 CICATRICIAL ALOPECIA: Primary | ICD-10-CM

## 2024-04-04 DIAGNOSIS — T50.905A: ICD-10-CM

## 2024-04-04 DIAGNOSIS — L67.8: ICD-10-CM

## 2024-04-04 DIAGNOSIS — L64.9 ANDROGENIC ALOPECIA: ICD-10-CM

## 2024-04-04 PROCEDURE — 1159F MED LIST DOCD IN RCRD: CPT | Mod: CPTII,S$GLB,, | Performed by: STUDENT IN AN ORGANIZED HEALTH CARE EDUCATION/TRAINING PROGRAM

## 2024-04-04 PROCEDURE — 11900 INJECT SKIN LESIONS </W 7: CPT | Mod: S$GLB,,, | Performed by: STUDENT IN AN ORGANIZED HEALTH CARE EDUCATION/TRAINING PROGRAM

## 2024-04-04 PROCEDURE — 3008F BODY MASS INDEX DOCD: CPT | Mod: CPTII,S$GLB,, | Performed by: STUDENT IN AN ORGANIZED HEALTH CARE EDUCATION/TRAINING PROGRAM

## 2024-04-04 PROCEDURE — 99213 OFFICE O/P EST LOW 20 MIN: CPT | Mod: 25,S$GLB,, | Performed by: STUDENT IN AN ORGANIZED HEALTH CARE EDUCATION/TRAINING PROGRAM

## 2024-04-04 PROCEDURE — 1160F RVW MEDS BY RX/DR IN RCRD: CPT | Mod: CPTII,S$GLB,, | Performed by: STUDENT IN AN ORGANIZED HEALTH CARE EDUCATION/TRAINING PROGRAM

## 2024-04-04 NOTE — PROGRESS NOTES
SUBJECTIVE:      Patient ID: Arnie Browne is a 33 y.o. female.    Chief Complaint: Annual Exam (Annual/Well Exam)    Presents for annual wellness exam & lab orders    Discussed outstanding health maintenance         Past Surgical History:   Procedure Laterality Date    CARDIAC CATHETERIZATION  2006    ASD closure     SECTION  ,,    COLONOSCOPY  2014    HYSTEROSCOPY WITH DILATION AND CURETTAGE OF UTERUS N/A 2020    Procedure: HYSTEROSCOPY, WITH DILATION AND CURETTAGE OF UTERUS;  Surgeon: Julissa Munoz MD;  Location: [facility] OR;  Service: OB/GYN;  Laterality: N/A;    THERMAL ABLATION OF ENDOMETRIUM USING HYSTEROSCOPY N/A 2020    Procedure: ABLATION, ENDOMETRIUM, THERMAL, HYSTEROSCOPIC - Novasure;  Surgeon: Julissa Munoz MD;  Location: [facility] OR;  Service: OB/GYN;  Laterality: N/A;    TUBAL LIGATION       Family History   Problem Relation Age of Onset    Arthritis Father     Hypertension Father     Stroke Father     Heart disease Father     No Known Problems Mother     Asthma Sister     Asthma Daughter     Cancer Paternal Grandmother         breast and bone cancer    Cancer Paternal Grandfather         Colon cancer    Diabetes Paternal Grandfather     No Known Problems Daughter     No Known Problems Daughter       Social History     Socioeconomic History    Marital status:     Number of children: 3   Occupational History    Occupation:    Tobacco Use    Smoking status: Never    Smokeless tobacco: Never   Substance and Sexual Activity    Alcohol use: No    Drug use: No    Sexual activity: Yes     Partners: Male     Birth control/protection: None     Social Determinants of Health     Financial Resource Strain: Low Risk  (2023)    Overall Financial Resource Strain (CARDIA)     Difficulty of Paying Living Expenses: Not very hard   Food Insecurity: No Food Insecurity (2023)    Hunger Vital Sign     Worried About Running Out of Food in the Last Year:  Never true     Ran Out of Food in the Last Year: Never true   Transportation Needs: No Transportation Needs (2/7/2023)    PRAPARE - Transportation     Lack of Transportation (Medical): No     Lack of Transportation (Non-Medical): No   Physical Activity: Insufficiently Active (2/7/2023)    Exercise Vital Sign     Days of Exercise per Week: 3 days     Minutes of Exercise per Session: 30 min   Stress: Stress Concern Present (2/7/2023)    [employer] - Occupational Stress Questionnaire     Feeling of Stress : To some extent   Social Connections: Unknown (2/7/2023)    Social Connection and Isolation Panel [NHANES]     Frequency of Communication with Friends and Family: More than three times a week     Frequency of Social Gatherings with Friends and Family: Patient declined     Active Member of Clubs or Organizations: Yes     Attends Club or Organization Meetings: More than 4 times per year     Marital Status:    Housing Stability: Unknown (2/7/2023)    Housing Stability Vital Sign     Unable to Pay for Housing in the Last Year: No     Unstable Housing in the Last Year: No     Current Outpatient Medications   Medication Sig Dispense Refill    ALPRAZolam (XANAX) 0.5 MG tablet Take 1 tablet (0.5 mg total) by mouth 2 (two) times daily as needed for Anxiety. 60 tablet 2    finasteride (PROSCAR) 5 mg tablet Take 1 tablet (5 mg total) by mouth once daily. 30 tablet 11    fluocinonide (LIDEX) 0.05 % external solution Apply topically 2 (two) times daily. 60 mL 1    folic acid (FOLVITE) 1 MG tablet 1 tablet      HIBICLENS 4 % external liquid       ibuprofen (ADVIL,MOTRIN) 600 MG tablet ibuprofen 600 mg tablet      minoxidiL (ROGAINE) 2 % external solution Apply topically 2 (two) times daily. 60 mL 6    mupirocin (BACTROBAN) 2 % ointment       nitrofurantoin, macrocrystal-monohydrate, (MACROBID) 100 MG capsule Take 1 capsule (100 mg total) by mouth 2 (two) times daily. 20 capsule 0    omeprazole  (PRILOSEC) 40 MG capsule Take 40 mg by mouth every morning.      plecanatide (TRULANCE) 3 mg Tab Take 1 tablet by mouth once daily.      PLENVU 140-9-5.2 gram PPkS Take by mouth.      valACYclovir (VALTREX) 500 MG tablet Take 500 mg by mouth as needed. DO NOT TAKE AM OF SURGERY      buPROPion (WELLBUTRIN XL) 300 MG 24 hr tablet Take 1 tablet (300 mg total) by mouth once daily. 30 tablet 5    lisdexamfetamine (VYVANSE) 20 MG capsule Take 1 capsule (20 mg total) by mouth every morning. 30 capsule 0    [START ON 2024] lisdexamfetamine (VYVANSE) 20 MG capsule Take 1 capsule (20 mg total) by mouth every morning. 30 capsule 0    [START ON 2024] lisdexamfetamine (VYVANSE) 20 MG capsule Take 1 capsule (20 mg total) by mouth every morning. 30 capsule 0     No current facility-administered medications for this visit.     Review of patient's allergies indicates:  No Known Allergies   Past Medical History:   Diagnosis Date    Anxiety     Chest pain     Herpes simplex without mention of complication     History of Amplatzer atrial septal defect closure 2016    History of atrial septal defect 2016    Migraine     PONV (postoperative nausea and vomiting)     Sickle cell trait     Vaso vagal episode     Vasovagal syncope     last      Past Surgical History:   Procedure Laterality Date    CARDIAC CATHETERIZATION  2006    ASD closure     SECTION  ,2016,2013    COLONOSCOPY  2014    HYSTEROSCOPY WITH DILATION AND CURETTAGE OF UTERUS N/A 2020    Procedure: HYSTEROSCOPY, WITH DILATION AND CURETTAGE OF UTERUS;  Surgeon: Julissa Munoz MD;  Location: [facility] OR;  Service: OB/GYN;  Laterality: N/A;    THERMAL ABLATION OF ENDOMETRIUM USING HYSTEROSCOPY N/A 2020    Procedure: ABLATION, ENDOMETRIUM, THERMAL, HYSTEROSCOPIC - Novasure;  Surgeon: Julissa Munoz MD;  Location: [facility] OR;  Service: OB/GYN;  Laterality: N/A;    TUBAL LIGATION         Review of Systems   Constitutional:   "Negative for activity change, appetite change, fatigue and unexpected weight change.   HENT:  Negative for congestion, ear pain, hearing loss, postnasal drip, rhinorrhea, sinus pressure, sinus pain, sneezing, sore throat and trouble swallowing.    Eyes:  Negative for photophobia, pain, discharge and visual disturbance.   Respiratory:  Negative for cough, chest tightness, shortness of breath and wheezing.    Cardiovascular:  Negative for chest pain, palpitations and leg swelling.   Gastrointestinal:  Negative for abdominal distention, abdominal pain, blood in stool, constipation, diarrhea, nausea and vomiting.   Endocrine: Negative for cold intolerance, heat intolerance, polydipsia and polyuria.   Genitourinary:  Negative for difficulty urinating, dysuria, flank pain, frequency, hematuria, menstrual problem, pelvic pain and urgency.   Musculoskeletal:  Negative for arthralgias, back pain, joint swelling, myalgias and neck pain.   Skin:  Negative for pallor.   Allergic/Immunologic: Negative for environmental allergies and food allergies.   Neurological:  Negative for dizziness, weakness, light-headedness, numbness and headaches.   Hematological:  Does not bruise/bleed easily.   Psychiatric/Behavioral:  Positive for decreased concentration. Negative for agitation, confusion, dysphoric mood and sleep disturbance. The patient is nervous/anxious (increased stress d/t going back to school).       OBJECTIVE:      Vitals:    04/02/24 1133   BP: 116/72   BP Location: Right arm   Patient Position: Sitting   BP Method: Large (Manual)   Pulse: 72   SpO2: 98%   Weight: 88 kg (194 lb)   Height: 5' 4" (1.626 m)     Physical Exam  Vitals and nursing note reviewed.   Constitutional:       General: She is not in acute distress.     Appearance: Normal appearance. She is well-developed and well-groomed. She is obese.   HENT:      Head: Normocephalic and atraumatic.      Right Ear: Hearing normal.      Left Ear: Hearing normal.      " Nose: Nose normal. No rhinorrhea.   Eyes:      General: Lids are normal.         Right eye: No discharge.         Left eye: No discharge.      Conjunctiva/sclera: Conjunctivae normal.      Right eye: Right conjunctiva is not injected.      Left eye: Left conjunctiva is not injected.      Pupils: Pupils are equal, round, and reactive to light. Pupils are equal.      Right eye: Pupil is round and reactive.      Left eye: Pupil is round and reactive.   Neck:      Thyroid: No thyromegaly.      Vascular: No JVD.      Trachea: Trachea normal. No tracheal deviation.   Cardiovascular:      Rate and Rhythm: Normal rate and regular rhythm.      Pulses:           Radial pulses are 2+ on the right side and 2+ on the left side.      Heart sounds: Normal heart sounds. No murmur heard.     No friction rub. No gallop.   Pulmonary:      Effort: Pulmonary effort is normal. No respiratory distress.      Breath sounds: Normal breath sounds. No stridor. No decreased breath sounds, wheezing, rhonchi or rales.   Abdominal:      General: Bowel sounds are normal. There is no distension.      Palpations: Abdomen is soft. Abdomen is not rigid.      Tenderness: There is no abdominal tenderness. There is no guarding.   Musculoskeletal:         General: Normal range of motion.      Cervical back: Normal range of motion and neck supple.   Lymphadenopathy:      Cervical: No cervical adenopathy.   Skin:     General: Skin is warm and dry.      Capillary Refill: Capillary refill takes less than 2 seconds.      Coloration: Skin is not pale.      Findings: No lesion or rash.   Neurological:      Mental Status: She is alert and oriented to person, place, and time.      GCS: GCS eye subscore is 4. GCS verbal subscore is 5. GCS motor subscore is 6.      Cranial Nerves: Cranial nerves 2-12 are intact.      Sensory: Sensation is intact.      Motor: Motor function is intact. No atrophy.      Coordination: Coordination is intact. Coordination normal.       Gait: Gait is intact. Gait normal.   Psychiatric:         Attention and Perception: Attention and perception normal. She is attentive.         Mood and Affect: Mood and affect normal.         Speech: Speech normal.         Behavior: Behavior normal.         Thought Content: Thought content normal.         Cognition and Memory: Cognition and memory normal.         Judgment: Judgment normal.        Assessment:       1. Routine health maintenance        Plan:       Routine health maintenance  -     CBC Auto Differential; Future; Expected date: 04/09/2024  -     Comprehensive Metabolic Panel; Future; Expected date: 04/02/2024  -     Lipid Panel; Future; Expected date: 04/02/2024  -     TSH; Future; Expected date: 04/02/2024  - will call with results of labs  - appears stable on current med regimen  - chronic meds refilled in separate encounter  - Limit: red meat, butter, fried foods, cheese, and other foods that have a lot of saturated fat. Consume: lean meats, fish, fruits, vegetables, whole grains, beans, lentils, and nuts. Weight loss and adequate daily hydration.  - Patient counseled on age appropriate medical preventive services, age appropriate cancer screenings, nutrition, healthy diet, consistent exercise regimen and maintaining an active lifestyle.  - Instructed on guidelines recommending 150 minutes per week of brisk exercise and healthy lifestyle. Recommended well screenings (including immunizations) reviewed with patient. Discussed outstanding health maintenance and rationale for completion.            Follow up in about 3 months (around 7/2/2024) for ADHD.      4/4/2024 AMALIA Gonzalez, FNP-C

## 2024-04-04 NOTE — PROGRESS NOTES
Subjective:      Patient ID:  Arnie Browne is a 33 y.o. female who presents for   Chief Complaint   Patient presents with    Hair Loss     LOV 10/09/2023    Patient coming in today for a follow up on hair loss. States that she stopped the Rogaine because of hair growing down her forehead and after stopping it the new growth fell out.  Continues with the Lidex solution and Finasteride without much difference    Feels hair loss is extending further back on her scalp now. Has hair pieces on the sides but not the top where hair loss is extending    Final Pathologic Diagnosis     Skin, scalp, biopsy:   -Cicatricial Alopecia, see note   Note: Given the clinical differential,  cicatricial alopecia seems most   appropriate in this context.  The findings are nonspecfic. There are some   viable hair follicles present;  however the majority are  minaturized with an   increased amount of sebaceous glands. This represents scarring and androgenic   alopecia both occurring concurrently.      Not planning for pregnancy, had ablation and tubal ligation.            Review of Systems   Constitutional:  Negative for fever and chills.   Respiratory:  Negative for cough and shortness of breath.    Gastrointestinal:  Negative for nausea and vomiting.       Objective:   Physical Exam   Constitutional: She appears well-developed and well-nourished. No distress.   Neurological: She is alert and oriented to person, place, and time. She is not disoriented.   Psychiatric: She has a normal mood and affect.   Skin:   Areas Examined (abnormalities noted in diagram):   Scalp / Hair Palpated and Inspected            Diagram Legend     Erythematous scaling macule/papule c/w actinic keratosis       Vascular papule c/w angioma      Pigmented verrucoid papule/plaque c/w seborrheic keratosis      Yellow umbilicated papule c/w sebaceous hyperplasia      Irregularly shaped tan macule c/w lentigo     1-2 mm smooth white papules consistent with Milia       Movable subcutaneous cyst with punctum c/w epidermal inclusion cyst      Subcutaneous movable cyst c/w pilar cyst      Firm pink to brown papule c/w dermatofibroma      Pedunculated fleshy papule(s) c/w skin tag(s)      Evenly pigmented macule c/w junctional nevus     Mildly variegated pigmented, slightly irregular-bordered macule c/w mildly atypical nevus      Flesh colored to evenly pigmented papule c/w intradermal nevus       Pink pearly papule/plaque c/w basal cell carcinoma      Erythematous hyperkeratotic cursted plaque c/w SCC      Surgical scar with no sign of skin cancer recurrence      Open and closed comedones      Inflammatory papules and pustules      Verrucoid papule consistent consistent with wart     Erythematous eczematous patches and plaques     Dystrophic onycholytic nail with subungual debris c/w onychomycosis     Umbilicated papule    Erythematous-base heme-crusted tan verrucoid plaque consistent with inflamed seborrheic keratosis     Erythematous Silvery Scaling Plaque c/w Psoriasis     See annotation            Assessment / Plan:          Androgenic alopecia  - biopsy showed combination of androgenic alopecia and scarring alopecia  - trial of ILK today  - continue finasteride 5mg daily  - continue lidex solution BID  - restart minoxidil at lower strength    Finasteride is not approved by the FDA for use in women, however multiple studies have shown benefit when used to treat female pattern hair loss in combination with topical minoxidil. Side effects are infrequent, usually mild, and reversible even with maintenance of treatment. They include decreased libido, breast tenderness, and headache. In premenopausal women, irregular menstruation and spotting may occur. Pregnancy must be avoided while on this medication (risk of feminization of the male fetus)    Cicatricial alopecia  Intralesional Kenalog 3.3mg/cc (3 cc total) injected into 1 lesions on the scalp today after obtaining verbal  consent including risk of surrounding hypopigmentation. Patient tolerated procedure well.    Units: 1  NDC for Kenalog 10mg/cc:  5586-2831-89    Drug-induced hair abnormality  Had increased facial hair growth with topical minoxidil - thinks she was using the 5% - she stopped using it and it fell out, along with progress she made on her scalp  Discussed restarting the lower dose of 2%           No follow-ups on file.

## 2024-05-22 ENCOUNTER — OFFICE VISIT (OUTPATIENT)
Dept: DERMATOLOGY | Facility: CLINIC | Age: 34
End: 2024-05-22
Payer: COMMERCIAL

## 2024-05-22 DIAGNOSIS — L66.9 CICATRICIAL ALOPECIA: Primary | ICD-10-CM

## 2024-05-22 DIAGNOSIS — L64.9 ANDROGENIC ALOPECIA: ICD-10-CM

## 2024-05-22 PROCEDURE — 99213 OFFICE O/P EST LOW 20 MIN: CPT | Mod: 25,S$GLB,, | Performed by: STUDENT IN AN ORGANIZED HEALTH CARE EDUCATION/TRAINING PROGRAM

## 2024-05-22 PROCEDURE — 1159F MED LIST DOCD IN RCRD: CPT | Mod: CPTII,S$GLB,, | Performed by: STUDENT IN AN ORGANIZED HEALTH CARE EDUCATION/TRAINING PROGRAM

## 2024-05-22 PROCEDURE — 11900 INJECT SKIN LESIONS </W 7: CPT | Mod: S$GLB,,, | Performed by: STUDENT IN AN ORGANIZED HEALTH CARE EDUCATION/TRAINING PROGRAM

## 2024-05-22 PROCEDURE — 1160F RVW MEDS BY RX/DR IN RCRD: CPT | Mod: CPTII,S$GLB,, | Performed by: STUDENT IN AN ORGANIZED HEALTH CARE EDUCATION/TRAINING PROGRAM

## 2024-05-22 NOTE — PROGRESS NOTES
Subjective:      Patient ID:  Arnie Browne is a 33 y.o. female who presents for   Chief Complaint   Patient presents with    Hair Loss     LOV 4/4/24    Patient coming in today for f/u on hair loss. Patient states she has seen new growth. Has not restarted rogaine yet. Continues with the Lidex solution and Finasteride, has been more consistent lately.   Had ILK x 1 round at last visit.     Final Pathologic Diagnosis     Skin, scalp, biopsy:   -Cicatricial Alopecia, see note   Note: Given the clinical differential,  cicatricial alopecia seems most   appropriate in this context.  The findings are nonspecfic. There are some   viable hair follicles present;  however the majority are  minaturized with an   increased amount of sebaceous glands. This represents scarring and androgenic   alopecia both occurring concurrently.      Not planning for pregnancy, had ablation and tubal ligation.          Review of Systems   Constitutional:  Negative for fever, chills and fatigue.   Respiratory:  Negative for cough and shortness of breath.    Gastrointestinal:  Negative for nausea and vomiting.       Objective:   Physical Exam   Constitutional: She appears well-developed and well-nourished.   Neurological: She is alert and oriented to person, place, and time.   Psychiatric: She has a normal mood and affect.        Diagram Legend     Erythematous scaling macule/papule c/w actinic keratosis       Vascular papule c/w angioma      Pigmented verrucoid papule/plaque c/w seborrheic keratosis      Yellow umbilicated papule c/w sebaceous hyperplasia      Irregularly shaped tan macule c/w lentigo     1-2 mm smooth white papules consistent with Milia      Movable subcutaneous cyst with punctum c/w epidermal inclusion cyst      Subcutaneous movable cyst c/w pilar cyst      Firm pink to brown papule c/w dermatofibroma      Pedunculated fleshy papule(s) c/w skin tag(s)      Evenly pigmented macule c/w junctional nevus     Mildly  variegated pigmented, slightly irregular-bordered macule c/w mildly atypical nevus      Flesh colored to evenly pigmented papule c/w intradermal nevus       Pink pearly papule/plaque c/w basal cell carcinoma      Erythematous hyperkeratotic cursted plaque c/w SCC      Surgical scar with no sign of skin cancer recurrence      Open and closed comedones      Inflammatory papules and pustules      Verrucoid papule consistent consistent with wart     Erythematous eczematous patches and plaques     Dystrophic onycholytic nail with subungual debris c/w onychomycosis     Umbilicated papule    Erythematous-base heme-crusted tan verrucoid plaque consistent with inflamed seborrheic keratosis     Erythematous Silvery Scaling Plaque c/w Psoriasis     See annotation                    Assessment / Plan:        Cicatricial alopecia  Intralesional Kenalog 3.3mg/cc (6 cc total) injected into 7 lesions on the scalp today after obtaining verbal consent including risk of surrounding hypopigmentation. Patient tolerated procedure well.    Units: 2  NDC for Kenalog 10mg/cc:  2959-9270-49    Androgenic alopecia  Restart rogaine 5% solution once daily, use farther back on scalp  Then switch to rogaine 2% solution  Continue finasteride 5mg   Continue lidex BID           No follow-ups on file.

## 2024-05-23 DIAGNOSIS — L65.9 ALOPECIA: ICD-10-CM

## 2024-05-23 RX ORDER — FLUOCINONIDE TOPICAL SOLUTION USP, 0.05% 0.5 MG/ML
SOLUTION TOPICAL
Qty: 60 ML | Refills: 0 | Status: SHIPPED | OUTPATIENT
Start: 2024-05-23

## 2024-07-02 ENCOUNTER — OFFICE VISIT (OUTPATIENT)
Dept: FAMILY MEDICINE | Facility: CLINIC | Age: 34
End: 2024-07-02
Payer: COMMERCIAL

## 2024-07-02 VITALS
HEIGHT: 64 IN | DIASTOLIC BLOOD PRESSURE: 66 MMHG | HEART RATE: 76 BPM | BODY MASS INDEX: 32.9 KG/M2 | WEIGHT: 192.69 LBS | OXYGEN SATURATION: 99 % | SYSTOLIC BLOOD PRESSURE: 110 MMHG

## 2024-07-02 DIAGNOSIS — R41.840 ATTENTION AND CONCENTRATION DEFICIT: ICD-10-CM

## 2024-07-02 PROCEDURE — 1160F RVW MEDS BY RX/DR IN RCRD: CPT | Mod: CPTII,S$GLB,, | Performed by: NURSE PRACTITIONER

## 2024-07-02 PROCEDURE — 1159F MED LIST DOCD IN RCRD: CPT | Mod: CPTII,S$GLB,, | Performed by: NURSE PRACTITIONER

## 2024-07-02 PROCEDURE — 99999 PR PBB SHADOW E&M-EST. PATIENT-LVL III: CPT | Mod: PBBFAC,,, | Performed by: NURSE PRACTITIONER

## 2024-07-02 PROCEDURE — 99213 OFFICE O/P EST LOW 20 MIN: CPT | Mod: S$GLB,,, | Performed by: NURSE PRACTITIONER

## 2024-07-02 PROCEDURE — 3008F BODY MASS INDEX DOCD: CPT | Mod: CPTII,S$GLB,, | Performed by: NURSE PRACTITIONER

## 2024-07-02 PROCEDURE — 3078F DIAST BP <80 MM HG: CPT | Mod: CPTII,S$GLB,, | Performed by: NURSE PRACTITIONER

## 2024-07-02 PROCEDURE — 3074F SYST BP LT 130 MM HG: CPT | Mod: CPTII,S$GLB,, | Performed by: NURSE PRACTITIONER

## 2024-07-02 RX ORDER — LISDEXAMFETAMINE DIMESYLATE CAPSULES 20 MG/1
20 CAPSULE ORAL EVERY MORNING
Qty: 30 CAPSULE | Refills: 0 | Status: SHIPPED | OUTPATIENT
Start: 2024-07-02 | End: 2024-07-02 | Stop reason: SDUPTHER

## 2024-07-02 RX ORDER — LISDEXAMFETAMINE DIMESYLATE CAPSULES 20 MG/1
20 CAPSULE ORAL EVERY MORNING
Qty: 30 CAPSULE | Refills: 0 | Status: SHIPPED | OUTPATIENT
Start: 2024-08-31

## 2024-07-02 RX ORDER — LISDEXAMFETAMINE DIMESYLATE CAPSULES 20 MG/1
20 CAPSULE ORAL EVERY MORNING
Qty: 30 CAPSULE | Refills: 0 | Status: SHIPPED | OUTPATIENT
Start: 2024-08-31 | End: 2024-07-02 | Stop reason: SDUPTHER

## 2024-07-02 RX ORDER — LISDEXAMFETAMINE DIMESYLATE CAPSULES 20 MG/1
20 CAPSULE ORAL EVERY MORNING
Qty: 30 CAPSULE | Refills: 0 | Status: SHIPPED | OUTPATIENT
Start: 2024-08-01 | End: 2024-07-02 | Stop reason: SDUPTHER

## 2024-07-02 RX ORDER — LISDEXAMFETAMINE DIMESYLATE CAPSULES 20 MG/1
20 CAPSULE ORAL EVERY MORNING
Qty: 30 CAPSULE | Refills: 0 | Status: SHIPPED | OUTPATIENT
Start: 2024-07-02

## 2024-07-02 RX ORDER — LISDEXAMFETAMINE DIMESYLATE CAPSULES 20 MG/1
20 CAPSULE ORAL EVERY MORNING
Qty: 30 CAPSULE | Refills: 0 | Status: SHIPPED | OUTPATIENT
Start: 2024-08-01

## 2024-07-02 NOTE — PROGRESS NOTES
SUBJECTIVE:      Patient ID: Arnie Browne is a 33 y.o. female.    Chief Complaint: Anxiety (3 month f/u Anxiety)    Presents for ADHD    Neurologic Problem  The patient's pertinent negatives include no weakness. Primary symptoms comment: ADHD. This is a chronic problem. The current episode started more than 1 year ago. The neurological problem developed insidiously. The problem has been waxing and waning since onset. There was no focality noted. Pertinent negatives include no abdominal pain, back pain, chest pain, confusion, dizziness, fatigue, light-headedness, nausea, neck pain, palpitations, shortness of breath or vomiting. Treatments tried: Vyvanse. The treatment provided significant relief. There is no history of a clotting disorder, a CVA or head trauma.       Past Surgical History:   Procedure Laterality Date    CARDIAC CATHETERIZATION      ASD closure     SECTION  ,,    COLONOSCOPY      HYSTEROSCOPY WITH DILATION AND CURETTAGE OF UTERUS N/A 2020    Procedure: HYSTEROSCOPY, WITH DILATION AND CURETTAGE OF UTERUS;  Surgeon: Julissa Munoz MD;  Location: [facility] OR;  Service: OB/GYN;  Laterality: N/A;    THERMAL ABLATION OF ENDOMETRIUM USING HYSTEROSCOPY N/A 2020    Procedure: ABLATION, ENDOMETRIUM, THERMAL, HYSTEROSCOPIC - Novasure;  Surgeon: Julissa Munoz MD;  Location: [facility] OR;  Service: OB/GYN;  Laterality: N/A;    TUBAL LIGATION       Family History   Problem Relation Name Age of Onset    Arthritis Father      Hypertension Father      Stroke Father      Heart disease Father      No Known Problems Mother      Asthma Sister      Asthma Daughter      Cancer Paternal Grandmother          breast and bone cancer    Cancer Paternal Grandfather          Colon cancer    Diabetes Paternal Grandfather      No Known Problems Daughter      No Known Problems Daughter        Social History     Socioeconomic History    Marital status:     Number of children: 3    Occupational History    Occupation:    Tobacco Use    Smoking status: Never    Smokeless tobacco: Never   Substance and Sexual Activity    Alcohol use: No    Drug use: No    Sexual activity: Yes     Partners: Male     Birth control/protection: None     Social Determinants of Health     Financial Resource Strain: Low Risk  (2/7/2023)    Overall Financial Resource Strain (CARDIA)     Difficulty of Paying Living Expenses: Not very hard   Food Insecurity: No Food Insecurity (2/7/2023)    Hunger Vital Sign     Worried About Running Out of Food in the Last Year: Never true     Ran Out of Food in the Last Year: Never true   Transportation Needs: No Transportation Needs (2/7/2023)    PRAPARE - Transportation     Lack of Transportation (Medical): No     Lack of Transportation (Non-Medical): No   Physical Activity: Insufficiently Active (2/7/2023)    Exercise Vital Sign     Days of Exercise per Week: 3 days     Minutes of Exercise per Session: 30 min   Stress: Stress Concern Present (2/7/2023)    [employer] - Occupational Stress Questionnaire     Feeling of Stress : To some extent   Housing Stability: Unknown (2/7/2023)    Housing Stability Vital Sign     Unable to Pay for Housing in the Last Year: No     Unstable Housing in the Last Year: No     Current Outpatient Medications   Medication Sig Dispense Refill    ALPRAZolam (XANAX) 0.5 MG tablet Take 1 tablet (0.5 mg total) by mouth 2 (two) times daily as needed for Anxiety. 60 tablet 2    buPROPion (WELLBUTRIN XL) 300 MG 24 hr tablet Take 1 tablet (300 mg total) by mouth once daily. 30 tablet 5    finasteride (PROSCAR) 5 mg tablet Take 1 tablet (5 mg total) by mouth once daily. 30 tablet 11    fluocinonide (LIDEX) 0.05 % external solution APPLY  SOLUTION TOPICALLY TWICE DAILY 60 mL 0    folic acid (FOLVITE) 1 MG tablet 1 tablet      HIBICLENS 4 % external liquid       ibuprofen (ADVIL,MOTRIN) 600 MG tablet ibuprofen 600 mg tablet       minoxidiL (ROGAINE) 2 % external solution Apply topically 2 (two) times daily. 60 mL 6    mupirocin (BACTROBAN) 2 % ointment       nitrofurantoin, macrocrystal-monohydrate, (MACROBID) 100 MG capsule Take 1 capsule (100 mg total) by mouth 2 (two) times daily. 20 capsule 0    omeprazole (PRILOSEC) 40 MG capsule Take 40 mg by mouth every morning.      plecanatide (TRULANCE) 3 mg Tab Take 1 tablet by mouth once daily.      PLENVU 140-9-5.2 gram PPkS Take by mouth.      valACYclovir (VALTREX) 500 MG tablet Take 500 mg by mouth as needed. DO NOT TAKE AM OF SURGERY      [START ON 2024] lisdexamfetamine (VYVANSE) 20 MG capsule Take 1 capsule (20 mg total) by mouth every morning. 30 capsule 0    [START ON 2024] lisdexamfetamine (VYVANSE) 20 MG capsule Take 1 capsule (20 mg total) by mouth every morning. 30 capsule 0    lisdexamfetamine (VYVANSE) 20 MG capsule Take 1 capsule (20 mg total) by mouth every morning. 30 capsule 0     No current facility-administered medications for this visit.     Review of patient's allergies indicates:  No Known Allergies   Past Medical History:   Diagnosis Date    Anxiety     Chest pain     Herpes simplex without mention of complication     History of Amplatzer atrial septal defect closure 2016    2006    History of atrial septal defect 2016    Migraine     PONV (postoperative nausea and vomiting)     Sickle cell trait     Vaso vagal episode     Vasovagal syncope     last      Past Surgical History:   Procedure Laterality Date    CARDIAC CATHETERIZATION  2006    ASD closure     SECTION  ,2016,2013    COLONOSCOPY  2014    HYSTEROSCOPY WITH DILATION AND CURETTAGE OF UTERUS N/A 2020    Procedure: HYSTEROSCOPY, WITH DILATION AND CURETTAGE OF UTERUS;  Surgeon: Julissa Munoz MD;  Location: [facility];  Service: OB/GYN;  Laterality: N/A;    THERMAL ABLATION OF ENDOMETRIUM USING HYSTEROSCOPY N/A 2020    Procedure: ABLATION, ENDOMETRIUM, THERMAL,  "HYSTEROSCOPIC - Novasure;  Surgeon: Julissa Munoz MD;  Location: [facility];  Service: OB/GYN;  Laterality: N/A;    TUBAL LIGATION  2016       Review of Systems   Constitutional:  Negative for activity change, appetite change, fatigue and unexpected weight change.   HENT:  Negative for congestion, ear pain, hearing loss, postnasal drip, sinus pressure, sinus pain, sneezing and sore throat.    Eyes:  Negative for photophobia and pain.   Respiratory:  Negative for cough, chest tightness, shortness of breath and wheezing.    Cardiovascular:  Negative for chest pain, palpitations and leg swelling.   Gastrointestinal:  Negative for abdominal distention, abdominal pain, blood in stool, constipation, diarrhea, nausea and vomiting.   Endocrine: Negative for cold intolerance, heat intolerance, polydipsia and polyuria.   Genitourinary:  Negative for difficulty urinating, dysuria, flank pain, frequency, hematuria, pelvic pain and urgency.   Musculoskeletal:  Negative for arthralgias, back pain, joint swelling, myalgias and neck pain.   Skin:  Negative for pallor.   Allergic/Immunologic: Negative for environmental allergies and food allergies.   Neurological:  Negative for dizziness, weakness, light-headedness and numbness.   Hematological:  Does not bruise/bleed easily.   Psychiatric/Behavioral:  Positive for decreased concentration and dysphoric mood. Negative for agitation, confusion and sleep disturbance. The patient is nervous/anxious.       OBJECTIVE:      Vitals:    07/02/24 1403   BP: 110/66   BP Location: Right arm   Patient Position: Sitting   BP Method: Large (Manual)   Pulse: 76   SpO2: 99%   Weight: 87.4 kg (192 lb 11.2 oz)   Height: 5' 4" (1.626 m)     Physical Exam  Vitals and nursing note reviewed.   Constitutional:       General: She is not in acute distress.     Appearance: Normal appearance. She is well-developed. She is obese.   HENT:      Head: Normocephalic and atraumatic.      Right Ear: Hearing normal. "      Left Ear: Hearing normal.      Nose: Nose normal. No rhinorrhea.   Eyes:      General: Lids are normal.         Right eye: No discharge.         Left eye: No discharge.      Conjunctiva/sclera: Conjunctivae normal.      Right eye: Right conjunctiva is not injected.      Left eye: Left conjunctiva is not injected.      Pupils: Pupils are equal, round, and reactive to light. Pupils are equal.      Right eye: Pupil is round and reactive.      Left eye: Pupil is round and reactive.   Neck:      Thyroid: No thyromegaly.      Vascular: No JVD.      Trachea: Trachea normal. No tracheal deviation.   Cardiovascular:      Rate and Rhythm: Normal rate and regular rhythm.      Pulses:           Radial pulses are 2+ on the right side and 2+ on the left side.        Posterior tibial pulses are 2+ on the right side and 2+ on the left side.      Heart sounds: Normal heart sounds. No murmur heard.     No friction rub. No gallop.   Pulmonary:      Effort: Pulmonary effort is normal. No respiratory distress.      Breath sounds: Normal breath sounds. No stridor. No decreased breath sounds, wheezing, rhonchi or rales.   Abdominal:      General: Bowel sounds are normal. There is no distension.      Palpations: Abdomen is soft. Abdomen is not rigid.      Tenderness: There is no abdominal tenderness. There is no guarding.   Musculoskeletal:         General: Normal range of motion.      Cervical back: Normal range of motion and neck supple.   Lymphadenopathy:      Cervical: No cervical adenopathy.   Skin:     General: Skin is warm and dry.      Capillary Refill: Capillary refill takes less than 2 seconds.      Coloration: Skin is not pale.      Findings: No lesion or rash.   Neurological:      Mental Status: She is alert and oriented to person, place, and time.      GCS: GCS eye subscore is 4. GCS verbal subscore is 5. GCS motor subscore is 6.      Motor: Motor function is intact. No atrophy.      Coordination: Coordination normal.       Gait: Gait normal.   Psychiatric:         Attention and Perception: Attention normal. She is attentive.         Mood and Affect: Mood and affect normal.         Speech: Speech normal.         Behavior: Behavior normal.         Thought Content: Thought content normal.         Cognition and Memory: Cognition normal.         Judgment: Judgment normal.        Assessment:       1. Attention and concentration deficit        Plan:       Attention and concentration deficit  -     lisdexamfetamine (VYVANSE) 20 MG capsule; Take 1 capsule (20 mg total) by mouth every morning.  Dispense: 30 capsule; Refill: 0  -     lisdexamfetamine (VYVANSE) 20 MG capsule; Take 1 capsule (20 mg total) by mouth every morning.  Dispense: 30 capsule; Refill: 0  -     lisdexamfetamine (VYVANSE) 20 MG capsule; Take 1 capsule (20 mg total) by mouth every morning.  Dispense: 30 capsule; Refill: 0              Follow up if symptoms worsen or fail to improve. Already scheduled for her next visit.      7/2/2024 AMALIA Gonzalez, FNP-C

## 2024-07-09 ENCOUNTER — OFFICE VISIT (OUTPATIENT)
Dept: DERMATOLOGY | Facility: CLINIC | Age: 34
End: 2024-07-09
Payer: COMMERCIAL

## 2024-07-09 DIAGNOSIS — L65.9 ALOPECIA: Primary | ICD-10-CM

## 2024-07-09 DIAGNOSIS — L64.9 ANDROGENIC ALOPECIA: ICD-10-CM

## 2024-07-09 PROCEDURE — 1159F MED LIST DOCD IN RCRD: CPT | Mod: CPTII,S$GLB,, | Performed by: STUDENT IN AN ORGANIZED HEALTH CARE EDUCATION/TRAINING PROGRAM

## 2024-07-09 PROCEDURE — G2211 COMPLEX E/M VISIT ADD ON: HCPCS | Mod: S$GLB,,, | Performed by: STUDENT IN AN ORGANIZED HEALTH CARE EDUCATION/TRAINING PROGRAM

## 2024-07-09 PROCEDURE — 1160F RVW MEDS BY RX/DR IN RCRD: CPT | Mod: CPTII,S$GLB,, | Performed by: STUDENT IN AN ORGANIZED HEALTH CARE EDUCATION/TRAINING PROGRAM

## 2024-07-09 PROCEDURE — 99214 OFFICE O/P EST MOD 30 MIN: CPT | Mod: S$GLB,,, | Performed by: STUDENT IN AN ORGANIZED HEALTH CARE EDUCATION/TRAINING PROGRAM

## 2024-07-09 RX ORDER — MINOXIDIL 2.5 MG/1
TABLET ORAL
Qty: 30 TABLET | Refills: 4 | Status: SHIPPED | OUTPATIENT
Start: 2024-07-09

## 2024-07-09 RX ORDER — MINOXIDIL 2.5 MG/1
2.5 TABLET ORAL DAILY
Qty: 30 TABLET | Refills: 4 | Status: SHIPPED | OUTPATIENT
Start: 2024-07-09 | End: 2024-07-09

## 2024-07-09 RX ORDER — FINASTERIDE 5 MG/1
5 TABLET, FILM COATED ORAL DAILY
Qty: 30 TABLET | Refills: 11 | Status: SHIPPED | OUTPATIENT
Start: 2024-07-09 | End: 2025-07-09

## 2024-07-09 NOTE — PROGRESS NOTES
Subjective:      Patient ID:  Arnie Browne is a 33 y.o. female who presents for   Chief Complaint   Patient presents with    Hair Loss     Follow up     LOV 05/22/2024    Patient coming in today for f/u on hair loss. Patient states she has seen new growth. Has not restarted rogaine yet. Continues with the Lidex solution and Finasteride, has been more consistent lately.   Had ILK x 2 round at last visit.     Final Pathologic Diagnosis     Skin, scalp, biopsy:   -Cicatricial Alopecia, see note   Note: Given the clinical differential,  cicatricial alopecia seems most   appropriate in this context.  The findings are nonspecfic. There are some   viable hair follicles present;  however the majority are  minaturized with an   increased amount of sebaceous glands. This represents scarring and androgenic   alopecia both occurring concurrently.      Not planning for pregnancy, had ablation and tubal ligation. No longer has menstrual cycles    Had cardiac catheter and asd (had surgery to close it).     No hx of HTN      Current Outpatient Medications:   ·  ALPRAZolam (XANAX) 0.5 MG tablet, Take 1 tablet (0.5 mg total) by mouth 2 (two) times daily as needed for Anxiety., Disp: 60 tablet, Rfl: 2  ·  buPROPion (WELLBUTRIN XL) 300 MG 24 hr tablet, Take 1 tablet (300 mg total) by mouth once daily., Disp: 30 tablet, Rfl: 5  ·  fluocinonide (LIDEX) 0.05 % external solution, APPLY  SOLUTION TOPICALLY TWICE DAILY, Disp: 60 mL, Rfl: 0  ·  ibuprofen (ADVIL,MOTRIN) 600 MG tablet, ibuprofen 600 mg tablet, Disp: , Rfl:   ·  [START ON 8/1/2024] lisdexamfetamine (VYVANSE) 20 MG capsule, Take 1 capsule (20 mg total) by mouth every morning., Disp: 30 capsule, Rfl: 0  ·  [START ON 8/31/2024] lisdexamfetamine (VYVANSE) 20 MG capsule, Take 1 capsule (20 mg total) by mouth every morning., Disp: 30 capsule, Rfl: 0  ·  lisdexamfetamine (VYVANSE) 20 MG capsule, Take 1 capsule (20 mg total) by mouth every morning., Disp: 30 capsule, Rfl: 0  ·   minoxidiL (ROGAINE) 2 % external solution, Apply topically 2 (two) times daily., Disp: 60 mL, Rfl: 6  ·  omeprazole (PRILOSEC) 40 MG capsule, Take 40 mg by mouth every morning., Disp: , Rfl:   ·  plecanatide (TRULANCE) 3 mg Tab, Take 1 tablet by mouth once daily., Disp: , Rfl:   ·  valACYclovir (VALTREX) 500 MG tablet, Take 500 mg by mouth as needed. DO NOT TAKE AM OF SURGERY, Disp: , Rfl:   ·  finasteride (PROSCAR) 5 mg tablet, Take 1 tablet (5 mg total) by mouth once daily., Disp: 30 tablet, Rfl: 11  ·  folic acid (FOLVITE) 1 MG tablet, 1 tablet, Disp: , Rfl:   ·  HIBICLENS 4 % external liquid, , Disp: , Rfl:   ·  minoxidiL (LONITEN) 2.5 MG tablet, Take 1 tablet (2.5 mg total) by mouth once daily., Disp: 30 tablet, Rfl: 4  ·  mupirocin (BACTROBAN) 2 % ointment, , Disp: , Rfl:   ·  nitrofurantoin, macrocrystal-monohydrate, (MACROBID) 100 MG capsule, Take 1 capsule (100 mg total) by mouth 2 (two) times daily., Disp: 20 capsule, Rfl: 0  ·  PLENVU 140-9-5.2 gram PPkS, Take by mouth., Disp: , Rfl:                    Review of Systems   Constitutional:  Negative for fever, chills and fatigue.   Respiratory:  Negative for cough and shortness of breath.    Gastrointestinal:  Negative for nausea and vomiting.       Objective:   Physical Exam   Constitutional: She appears well-developed.   Neurological: She is alert and oriented to person, place, and time.   Psychiatric: She has a normal mood and affect.   Skin:   Areas Examined (abnormalities noted in diagram):   Scalp / Hair Palpated and Inspected            Diagram Legend     Erythematous scaling macule/papule c/w actinic keratosis       Vascular papule c/w angioma      Pigmented verrucoid papule/plaque c/w seborrheic keratosis      Yellow umbilicated papule c/w sebaceous hyperplasia      Irregularly shaped tan macule c/w lentigo     1-2 mm smooth white papules consistent with Milia      Movable subcutaneous cyst with punctum c/w epidermal inclusion cyst       Subcutaneous movable cyst c/w pilar cyst      Firm pink to brown papule c/w dermatofibroma      Pedunculated fleshy papule(s) c/w skin tag(s)      Evenly pigmented macule c/w junctional nevus     Mildly variegated pigmented, slightly irregular-bordered macule c/w mildly atypical nevus      Flesh colored to evenly pigmented papule c/w intradermal nevus       Pink pearly papule/plaque c/w basal cell carcinoma      Erythematous hyperkeratotic cursted plaque c/w SCC      Surgical scar with no sign of skin cancer recurrence      Open and closed comedones      Inflammatory papules and pustules      Verrucoid papule consistent consistent with wart     Erythematous eczematous patches and plaques     Dystrophic onycholytic nail with subungual debris c/w onychomycosis     Umbilicated papule    Erythematous-base heme-crusted tan verrucoid plaque consistent with inflamed seborrheic keratosis     Erythematous Silvery Scaling Plaque c/w Psoriasis     See annotation                                    Assessment / Plan:        Alopecia  Androgenic alopecia  -     minoxidiL (LONITEN) 2.5 MG tablet; Take 1/2 tablet (1.25 mg) daily  Dispense: 30 tablet; Refill: 4  -     finasteride (PROSCAR) 5 mg tablet; Take 1 tablet (5 mg total) by mouth once daily.  Dispense: 30 tablet; Refill: 11  Worsening now, however she did stop topical rogaine due to excess growth on her forehead  Finasteride is not approved by the FDA for use in women, however multiple studies have shown benefit when used to treat female pattern hair loss in combination with topical minoxidil. Side effects are infrequent, usually mild, and reversible even with maintenance of treatment. They include decreased libido, breast tenderness, and headache. In premenopausal women, irregular menstruation and spotting may occur. Pregnancy must be avoided while on this medication (risk of feminization of the male fetus)  Counseled on risks and benefits of PO minoxidil, if any side  effects occur she was instructed to stop the medication       4 months    No follow-ups on file.

## 2024-09-26 ENCOUNTER — PATIENT MESSAGE (OUTPATIENT)
Dept: FAMILY MEDICINE | Facility: CLINIC | Age: 34
End: 2024-09-26
Payer: COMMERCIAL

## 2024-10-04 ENCOUNTER — HOSPITAL ENCOUNTER (EMERGENCY)
Facility: HOSPITAL | Age: 34
Discharge: HOME | End: 2024-10-04
Attending: STUDENT IN AN ORGANIZED HEALTH CARE EDUCATION/TRAINING PROGRAM
Payer: COMMERCIAL

## 2024-10-04 VITALS
WEIGHT: 165 LBS | BODY MASS INDEX: 28.17 KG/M2 | HEIGHT: 64 IN | DIASTOLIC BLOOD PRESSURE: 65 MMHG | RESPIRATION RATE: 18 BRPM | TEMPERATURE: 98 F | SYSTOLIC BLOOD PRESSURE: 132 MMHG | OXYGEN SATURATION: 98 % | HEART RATE: 65 BPM

## 2024-10-04 DIAGNOSIS — M94.0 COSTOCHONDRITIS: Primary | ICD-10-CM

## 2024-10-04 DIAGNOSIS — R07.9 CHEST PAIN: ICD-10-CM

## 2024-10-04 LAB
B-HCG UR QL: NEGATIVE
CTP QC/QA: YES
OHS QRS DURATION: 78 MS
OHS QTC CALCULATION: 450 MS

## 2024-10-04 PROCEDURE — 96372 THER/PROPH/DIAG INJ SC/IM: CPT | Performed by: STUDENT IN AN ORGANIZED HEALTH CARE EDUCATION/TRAINING PROGRAM

## 2024-10-04 PROCEDURE — 25000003 PHARM REV CODE 250: Performed by: STUDENT IN AN ORGANIZED HEALTH CARE EDUCATION/TRAINING PROGRAM

## 2024-10-04 PROCEDURE — 99285 EMERGENCY DEPT VISIT HI MDM: CPT | Mod: 25

## 2024-10-04 PROCEDURE — 81025 URINE PREGNANCY TEST: CPT | Performed by: PHYSICIAN ASSISTANT

## 2024-10-04 PROCEDURE — 93005 ELECTROCARDIOGRAM TRACING: CPT | Performed by: INTERNAL MEDICINE

## 2024-10-04 PROCEDURE — 63600175 PHARM REV CODE 636 W HCPCS: Performed by: STUDENT IN AN ORGANIZED HEALTH CARE EDUCATION/TRAINING PROGRAM

## 2024-10-04 PROCEDURE — 93010 ELECTROCARDIOGRAM REPORT: CPT | Mod: ,,, | Performed by: INTERNAL MEDICINE

## 2024-10-04 RX ORDER — METHOCARBAMOL 750 MG/1
750 TABLET, FILM COATED ORAL 3 TIMES DAILY
Qty: 15 TABLET | Refills: 0 | Status: SHIPPED | OUTPATIENT
Start: 2024-10-04 | End: 2024-10-09

## 2024-10-04 RX ORDER — KETOROLAC TROMETHAMINE 30 MG/ML
30 INJECTION, SOLUTION INTRAMUSCULAR; INTRAVENOUS
Status: COMPLETED | OUTPATIENT
Start: 2024-10-04 | End: 2024-10-04

## 2024-10-04 RX ORDER — ACETAMINOPHEN 500 MG
1000 TABLET ORAL
Status: COMPLETED | OUTPATIENT
Start: 2024-10-04 | End: 2024-10-04

## 2024-10-04 RX ADMIN — ACETAMINOPHEN 1000 MG: 500 TABLET, FILM COATED ORAL at 05:10

## 2024-10-04 RX ADMIN — KETOROLAC TROMETHAMINE 30 MG: 30 INJECTION, SOLUTION INTRAMUSCULAR; INTRAVENOUS at 05:10

## 2024-10-04 NOTE — DISCHARGE INSTRUCTIONS
Please return to the emergency department if you have new or worsening symptoms.  Begin taking Robaxin and ibuprofen for your symptoms for the next 3 days at least.

## 2024-10-04 NOTE — FIRST PROVIDER EVALUATION
Emergency Department TeleTriage Encounter Note      CHIEF COMPLAINT    Chief Complaint   Patient presents with    Chest Pain     Complains of left sided chest pain with increased pain with and pressure with deep breath to the left back, shoulder, and left chest.         VITAL SIGNS   Initial Vitals [10/04/24 1421]   BP Pulse Resp Temp SpO2   137/80 83 18 99 °F (37.2 °C) 95 %      MAP       --            ALLERGIES    Review of patient's allergies indicates:  No Known Allergies    PROVIDER TRIAGE NOTE  Patient presents with complaint of chest pain that started this morning.  Reports worse with movement.  Denies shortness of breath.  Denies lower extremity swelling.      Phy:   Constitutional: well nourished, well developed, appearing stated age, NAD        Initial orders will be placed and care will be transferred to an alternate provider when patient is roomed for a full evaluation. Any additional orders and the final disposition will be determined by that provider.        ORDERS  Labs Reviewed - No data to display    ED Orders (720h ago, onward)      Start Ordered     Status Ordering Provider    10/04/24 1424 10/04/24 1423  EKG 12-lead  Once         Completed by MARLA THAKUR on 10/4/2024 at  2:24 PM ISAIAS FREDERICK              Virtual Visit Note: The provider triage portion of this emergency department evaluation and documentation was performed via [employer], a HIPAA-compliant telemedicine application, in concert with a tele-presenter in the room. A face to face patient evaluation with one of my colleagues will occur once the patient is placed in an emergency department room.      DISCLAIMER: This note was prepared with [employer] voice recognition transcription software. Garbled syntax, mangled pronouns, and other bizarre constructions may be attributed to that software system.

## 2024-10-05 NOTE — ED PROVIDER NOTES
Encounter Date: 10/4/2024       History     Chief Complaint   Patient presents with    Chest Pain     Complains of left sided chest pain with increased pain with and pressure with deep breath to the left back, shoulder, and left chest.       MEHNAZ Browne is a 34 y.o. female with a past medical history previous ASD that was repaired in  that presents emergency department for evaluation of chest wall pain and back pain that began this morning.  Has had similar episodes in the past but this has more severe.  She has been having multiple viral illnesses recently.  Has pain with deep inspiration.  Denies radiation of the pain, abdominal pain, nausea, vomiting, changes in bowel habits, changes in urinary habits, and fever.    Review of patient's allergies indicates:  No Known Allergies  Past Medical History:   Diagnosis Date    Anxiety     Chest pain     Herpes simplex without mention of complication     History of Amplatzer atrial septal defect closure 2016    History of atrial septal defect 2016    Migraine     PONV (postoperative nausea and vomiting)     Sickle cell trait     Vaso vagal episode     Vasovagal syncope     last      Past Surgical History:   Procedure Laterality Date    CARDIAC CATHETERIZATION  2006    ASD closure     SECTION  ,,    COLONOSCOPY  2014    HYSTEROSCOPY WITH DILATION AND CURETTAGE OF UTERUS N/A 2020    Procedure: HYSTEROSCOPY, WITH DILATION AND CURETTAGE OF UTERUS;  Surgeon: Julissa Munoz MD;  Location: [facility] OR;  Service: OB/GYN;  Laterality: N/A;    THERMAL ABLATION OF ENDOMETRIUM USING HYSTEROSCOPY N/A 2020    Procedure: ABLATION, ENDOMETRIUM, THERMAL, HYSTEROSCOPIC - Novasure;  Surgeon: Julissa Munoz MD;  Location: [facility] OR;  Service: OB/GYN;  Laterality: N/A;    TUBAL LIGATION       Family History   Problem Relation Name Age of Onset    Arthritis Father      Hypertension Father      Stroke Father      Heart  disease Father      No Known Problems Mother      Asthma Sister      Asthma Daughter      Cancer Paternal Grandmother          breast and bone cancer    Cancer Paternal Grandfather          Colon cancer    Diabetes Paternal Grandfather      No Known Problems Daughter      No Known Problems Daughter       Social History     Tobacco Use    Smoking status: Never    Smokeless tobacco: Never   Substance Use Topics    Alcohol use: No    Drug use: No     Review of Systems   Constitutional:  Negative for fever.   HENT:  Negative for sore throat.    Respiratory:  Negative for shortness of breath.    Cardiovascular:  Positive for chest pain.   Gastrointestinal:  Negative for abdominal pain, diarrhea, nausea and vomiting.   Genitourinary:  Negative for dysuria.   Musculoskeletal:  Positive for back pain. Negative for neck pain.   Skin:  Negative for rash.   Neurological:  Negative for weakness.   Hematological:  Does not bruise/bleed easily.       Physical Exam     Initial Vitals [10/04/24 1421]   BP Pulse Resp Temp SpO2   137/80 83 18 99 °F (37.2 °C) 95 %      MAP       --         Physical Exam    Nursing note and vitals reviewed.  Constitutional: She appears well-developed and well-nourished.   HENT:   Head: Normocephalic and atraumatic.   Eyes: EOM are normal. Pupils are equal, round, and reactive to light.   Neck:   Normal range of motion.  Cardiovascular:  Normal rate, regular rhythm and normal heart sounds.           Pulmonary/Chest: Breath sounds normal. No respiratory distress. She has no wheezes. She has no rhonchi. She has no rales.   Abdominal: Abdomen is soft. She exhibits no distension. There is no abdominal tenderness. There is no rebound.   Musculoskeletal:         General: Normal range of motion.      Cervical back: Normal range of motion.      Comments: Left-sided chest wall tenderness along the ribs as well as left shoulder tenderness.  Left scapular tenderness.  Tenderness is most prominent along the  parasternal region.     Neurological: She is alert and oriented to person, place, and time. She has normal strength. No cranial nerve deficit or sensory deficit. GCS score is 15. GCS eye subscore is 4. GCS verbal subscore is 5. GCS motor subscore is 6.   Skin: Capillary refill takes less than 2 seconds. No rash noted.   Psychiatric: She has a normal mood and affect. Thought content normal.         ED Course   Procedures  Labs Reviewed   POCT URINE PREGNANCY       Result Value    POC Preg Test, Ur Negative       Acceptable Yes          ECG Results              EKG 12-lead (Final result)        Collection Time Result Time QRS Duration OHS QTC Calculation    10/04/24 14:14:25 10/04/24 20:40:16 78 450                     Final result by Interface, Lab In [facility] (10/04/24 20:40:24)                   Narrative:    Test Reason : R07.9,    Vent. Rate : 088 BPM     Atrial Rate : 088 BPM     P-R Int : 148 ms          QRS Dur : 078 ms      QT Int : 372 ms       P-R-T Axes : 060 055 039 degrees     QTc Int : 450 ms    Normal sinus rhythm  Nonspecific T wave abnormality  Abnormal ECG    Confirmed by Kallie PERSON, Ibrahima Grey ([id number]) on 10/4/2024 8:40:14 PM    Referred By: [email or web address]   SELF           Confirmed By:Ibrahima Arreguin MD                                  Imaging Results              X-Ray Chest PA And Lateral (Final result)  Result time 10/04/24 15:19:08   Procedure changed from X-Ray Chest 1 View     Final result by Arlet Mccoy MD (10/04/24 15:19:08)                   Impression:      Normal chest.      Electronically signed by: Arlet Mccoy  Date:    10/04/2024  Time:    15:19               Narrative:    EXAMINATION:  XR CHEST PA AND LATERAL    CLINICAL HISTORY:  chest pain;  Chest pain, unspecified    FINDINGS:  PA and lateral chest is compared to 09/08/2023 shows normal cardiomediastinal silhouette.    Lungs are clear. Pulmonary vasculature is normal. No acute osseous abnormality.                                        Medications   ketorolac injection 30 mg (30 mg Intramuscular Given 10/4/24 1727)   acetaminophen tablet 1,000 mg (1,000 mg Oral Given 10/4/24 1725)     Medical Decision Making  Arnie Browne is a 34 y.o. female without significant medical problems that presents emergency department for evaluation of chest and back pain.  Ongoing for 1 day.  Vitals are stable.  Tenderness to palpation along the chest wall and back.  No overlying rashes.  Presentation most consistent with costochondritis.  EKG shows normal sinus rhythm without acute ST segment or T-wave changes.  Chest x-ray is clear.  Given Toradol and Tylenol with improvement of symptoms.  Prescribed Robaxin and NSAIDs.  Return precautions given.  Instructed follow up with PCP.    Risk  OTC drugs.  Prescription drug management.                                      Clinical Impression:  Final diagnoses:  [R07.9] Chest pain  [M94.0] Costochondritis (Primary)          ED Disposition Condition    Discharge Stable          ED Prescriptions       Medication Sig Dispense Start Date End Date Auth. Provider    methocarbamoL (ROBAXIN) 750 MG Tab Take 1 tablet (750 mg total) by mouth 3 (three) times daily. for 5 days 15 tablet 10/4/2024 10/9/2024 Jose E Garcia MD          Follow-up Information       Follow up With Specialties Details Why Contact Info Additional Information    [facility] - Emergency Dept Emergency Medicine  As needed, If symptoms worsen [address] [facility] [id number] [phone] 1st floor    Amparo Monroe APRN,FNP-C Family Medicine Call in 2 days Follow-up on ED visit [address] [facility] [id number]  [phone]                Jose E Garcia MD  10/04/24 5129

## 2024-10-21 ENCOUNTER — LAB VISIT (OUTPATIENT)
Dept: LAB | Facility: HOSPITAL | Age: 34
End: 2024-10-21
Attending: NURSE PRACTITIONER
Payer: COMMERCIAL

## 2024-10-21 ENCOUNTER — OFFICE VISIT (OUTPATIENT)
Dept: FAMILY MEDICINE | Facility: CLINIC | Age: 34
End: 2024-10-21
Payer: COMMERCIAL

## 2024-10-21 ENCOUNTER — HOSPITAL ENCOUNTER (OUTPATIENT)
Dept: RADIOLOGY | Facility: HOSPITAL | Age: 34
Discharge: HOME | End: 2024-10-21
Attending: NURSE PRACTITIONER
Payer: COMMERCIAL

## 2024-10-21 VITALS
DIASTOLIC BLOOD PRESSURE: 62 MMHG | OXYGEN SATURATION: 98 % | WEIGHT: 166 LBS | SYSTOLIC BLOOD PRESSURE: 120 MMHG | HEART RATE: 97 BPM | BODY MASS INDEX: 28.49 KG/M2

## 2024-10-21 DIAGNOSIS — M54.2 ACUTE NECK PAIN: ICD-10-CM

## 2024-10-21 DIAGNOSIS — Z00.00 ROUTINE GENERAL MEDICAL EXAMINATION AT A HEALTH CARE FACILITY: Primary | ICD-10-CM

## 2024-10-21 DIAGNOSIS — M54.6 ACUTE LEFT-SIDED THORACIC BACK PAIN: ICD-10-CM

## 2024-10-21 DIAGNOSIS — F41.9 ANXIETY: ICD-10-CM

## 2024-10-21 DIAGNOSIS — R63.5 WEIGHT GAIN: ICD-10-CM

## 2024-10-21 DIAGNOSIS — M94.0 COSTOCHONDRITIS: Primary | ICD-10-CM

## 2024-10-21 LAB
ALBUMIN SERPL BCP-MCNC: 4.1 G/DL (ref 3.5–5.2)
ALP SERPL-CCNC: 39 U/L (ref 40–150)
ALT SERPL W/O P-5'-P-CCNC: 13 U/L (ref 10–44)
ANION GAP SERPL CALC-SCNC: 7 MMOL/L (ref 8–16)
AST SERPL-CCNC: 15 U/L (ref 10–40)
BASOPHILS # BLD AUTO: 0.04 K/UL (ref 0–0.2)
BASOPHILS NFR BLD: 0.7 % (ref 0–1.9)
BILIRUB SERPL-MCNC: 0.8 MG/DL (ref 0.1–1)
BUN SERPL-MCNC: 7 MG/DL (ref 6–20)
CALCIUM SERPL-MCNC: 9.3 MG/DL (ref 8.7–10.5)
CHLORIDE SERPL-SCNC: 111 MMOL/L (ref 95–110)
CO2 SERPL-SCNC: 22 MMOL/L (ref 23–29)
CREAT SERPL-MCNC: 0.9 MG/DL (ref 0.5–1.4)
DIFFERENTIAL METHOD BLD: ABNORMAL
EOSINOPHIL # BLD AUTO: 0.2 K/UL (ref 0–0.5)
EOSINOPHIL NFR BLD: 3.6 % (ref 0–8)
ERYTHROCYTE [DISTWIDTH] IN BLOOD BY AUTOMATED COUNT: 13.8 % (ref 11.5–14.5)
EST. GFR  (NO RACE VARIABLE): >60 ML/MIN/1.73 M^2
GLUCOSE SERPL-MCNC: 84 MG/DL (ref 70–110)
HCT VFR BLD AUTO: 35.3 % (ref 37–48.5)
HGB BLD-MCNC: 12.4 G/DL (ref 12–16)
IMM GRANULOCYTES # BLD AUTO: 0.01 K/UL (ref 0–0.04)
IMM GRANULOCYTES NFR BLD AUTO: 0.2 % (ref 0–0.5)
LYMPHOCYTES # BLD AUTO: 2.5 K/UL (ref 1–4.8)
LYMPHOCYTES NFR BLD: 47 % (ref 18–48)
MCH RBC QN AUTO: 30.2 PG (ref 27–31)
MCHC RBC AUTO-ENTMCNC: 35.1 G/DL (ref 32–36)
MCV RBC AUTO: 86 FL (ref 82–98)
MONOCYTES # BLD AUTO: 0.6 K/UL (ref 0.3–1)
MONOCYTES NFR BLD: 11.4 % (ref 4–15)
NEUTROPHILS # BLD AUTO: 2 K/UL (ref 1.8–7.7)
NEUTROPHILS NFR BLD: 37.1 % (ref 38–73)
NRBC BLD-RTO: 0 /100 WBC
PLATELET # BLD AUTO: 401 K/UL (ref 150–450)
PMV BLD AUTO: 10.2 FL (ref 9.2–12.9)
POTASSIUM SERPL-SCNC: 3.1 MMOL/L (ref 3.5–5.1)
PROT SERPL-MCNC: 7.3 G/DL (ref 6–8.4)
RBC # BLD AUTO: 4.1 M/UL (ref 4–5.4)
SODIUM SERPL-SCNC: 140 MMOL/L (ref 136–145)
TSH SERPL DL<=0.005 MIU/L-ACNC: 1.13 UIU/ML (ref 0.4–4)
WBC # BLD AUTO: 5.34 K/UL (ref 3.9–12.7)

## 2024-10-21 PROCEDURE — 99214 OFFICE O/P EST MOD 30 MIN: CPT | Mod: S$GLB,,, | Performed by: NURSE PRACTITIONER

## 2024-10-21 PROCEDURE — 72070 X-RAY EXAM THORAC SPINE 2VWS: CPT | Mod: 26,,, | Performed by: RADIOLOGY

## 2024-10-21 PROCEDURE — 80053 COMPREHEN METABOLIC PANEL: CPT | Performed by: NURSE PRACTITIONER

## 2024-10-21 PROCEDURE — 84443 ASSAY THYROID STIM HORMONE: CPT | Performed by: NURSE PRACTITIONER

## 2024-10-21 PROCEDURE — 3008F BODY MASS INDEX DOCD: CPT | Mod: CPTII,S$GLB,, | Performed by: NURSE PRACTITIONER

## 2024-10-21 PROCEDURE — 1159F MED LIST DOCD IN RCRD: CPT | Mod: CPTII,S$GLB,, | Performed by: NURSE PRACTITIONER

## 2024-10-21 PROCEDURE — 72070 X-RAY EXAM THORAC SPINE 2VWS: CPT | Mod: TC

## 2024-10-21 PROCEDURE — 3078F DIAST BP <80 MM HG: CPT | Mod: CPTII,S$GLB,, | Performed by: NURSE PRACTITIONER

## 2024-10-21 PROCEDURE — 72040 X-RAY EXAM NECK SPINE 2-3 VW: CPT | Mod: TC

## 2024-10-21 PROCEDURE — 72040 X-RAY EXAM NECK SPINE 2-3 VW: CPT | Mod: 26,,, | Performed by: RADIOLOGY

## 2024-10-21 PROCEDURE — 85025 COMPLETE CBC W/AUTO DIFF WBC: CPT | Performed by: NURSE PRACTITIONER

## 2024-10-21 PROCEDURE — 3074F SYST BP LT 130 MM HG: CPT | Mod: CPTII,S$GLB,, | Performed by: NURSE PRACTITIONER

## 2024-10-21 PROCEDURE — 1160F RVW MEDS BY RX/DR IN RCRD: CPT | Mod: CPTII,S$GLB,, | Performed by: NURSE PRACTITIONER

## 2024-10-21 PROCEDURE — 36415 COLL VENOUS BLD VENIPUNCTURE: CPT | Performed by: NURSE PRACTITIONER

## 2024-10-21 PROCEDURE — 99999 PR PBB SHADOW E&M-EST. PATIENT-LVL IV: CPT | Mod: PBBFAC,,, | Performed by: NURSE PRACTITIONER

## 2024-10-21 RX ORDER — IBUPROFEN 800 MG/1
800 TABLET ORAL 3 TIMES DAILY PRN
Qty: 30 TABLET | Refills: 5 | Status: SHIPPED | OUTPATIENT
Start: 2024-10-21

## 2024-10-21 RX ORDER — BUPROPION HYDROCHLORIDE 300 MG/1
300 TABLET ORAL DAILY
Qty: 30 TABLET | Refills: 5 | Status: SHIPPED | OUTPATIENT
Start: 2024-10-21

## 2024-10-21 RX ORDER — ALPRAZOLAM 0.5 MG/1
0.5 TABLET ORAL 2 TIMES DAILY PRN
Qty: 60 TABLET | Refills: 2 | Status: SHIPPED | OUTPATIENT
Start: 2024-10-21

## 2024-10-23 ENCOUNTER — PATIENT MESSAGE (OUTPATIENT)
Dept: FAMILY MEDICINE | Facility: CLINIC | Age: 34
End: 2024-10-23
Payer: COMMERCIAL

## 2024-10-23 NOTE — PROGRESS NOTES
SUBJECTIVE:      Patient ID: Arnie Browne is a 34 y.o. female.    Chief Complaint: Follow-up, Neck Pain, Shoulder Pain, Arm Pain (Ieft arm ), and Chest Pain    Presents for ongoing pain in central chest (recently went to ED for same & was diagnosed with costochondritis), neck, mid-back    Discussed outstanding health maintenance     Neck Pain   This is a recurrent problem. The current episode started 1 to 4 weeks ago. The problem occurs daily. The problem has been waxing and waning. The pain is associated with an unknown factor. The pain is present in the midline. The pain is moderate. The symptoms are aggravated by position, bending, twisting and stress. Associated symptoms include chest pain, headaches and weight loss. Pertinent negatives include no numbness, photophobia or weakness. She has tried NSAIDs and muscle relaxants for the symptoms. The treatment provided mild relief.   Chest Pain   This is a recurrent problem. The current episode started 1 to 4 weeks ago. The onset quality is sudden. The problem occurs daily. The problem has been waxing and waning. The pain is present in the substernal region. The pain is moderate. The quality of the pain is described as pressure and burning. The pain radiates to the mid back and upper back. Associated symptoms include back pain and headaches. Pertinent negatives include no abdominal pain, cough, dizziness, nausea, numbness, palpitations, shortness of breath, vomiting or weakness. She has tried NSAIDs and rest for the symptoms. The treatment provided mild relief. Risk factors include lack of exercise, stress and sedentary lifestyle.   Her past medical history is significant for anxiety/panic attacks. Prior diagnostic workup includes echocardiogram and chest x-ray.   Back Pain  This is a recurrent problem. The current episode started 1 to 4 weeks ago. The problem occurs daily. The problem has been waxing and waning since onset. The pain is present in the  thoracic spine. The quality of the pain is described as aching and burning. The pain does not radiate. The pain is moderate. The symptoms are aggravated by position, twisting, standing, sitting and bending. Associated symptoms include chest pain, headaches and weight loss. Pertinent negatives include no abdominal pain, dysuria, numbness, pelvic pain or weakness. Risk factors include lack of exercise and sedentary lifestyle. She has tried NSAIDs, muscle relaxant, bed rest and heat for the symptoms. The treatment provided mild relief.   Anxiety  Presents for follow-up visit. Symptoms include chest pain, decreased concentration, excessive worry, insomnia, muscle tension and nervous/anxious behavior. Patient reports no confusion, dizziness, nausea, palpitations or shortness of breath. Symptoms occur most days. The severity of symptoms is moderate. The quality of sleep is fair.     Her past medical history is significant for anxiety/panic attacks. Compliance with medications is %.       Past Surgical History:   Procedure Laterality Date    CARDIAC CATHETERIZATION  2006    ASD closure     SECTION  ,,    COLONOSCOPY  2014    HYSTEROSCOPY WITH DILATION AND CURETTAGE OF UTERUS N/A 2020    Procedure: HYSTEROSCOPY, WITH DILATION AND CURETTAGE OF UTERUS;  Surgeon: Julissa Munoz MD;  Location: [facility] OR;  Service: OB/GYN;  Laterality: N/A;    THERMAL ABLATION OF ENDOMETRIUM USING HYSTEROSCOPY N/A 2020    Procedure: ABLATION, ENDOMETRIUM, THERMAL, HYSTEROSCOPIC - Novasure;  Surgeon: Julissa Munoz MD;  Location: [facility] OR;  Service: OB/GYN;  Laterality: N/A;    TUBAL LIGATION       Family History   Problem Relation Name Age of Onset    Arthritis Father      Hypertension Father      Stroke Father      Heart disease Father      No Known Problems Mother      Asthma Sister      Asthma Daughter      Cancer Paternal Grandmother          breast and bone cancer    Cancer Paternal Grandfather           Colon cancer    Diabetes Paternal Grandfather      No Known Problems Daughter      No Known Problems Daughter        Social History     Socioeconomic History    Marital status:     Number of children: 3   Occupational History    Occupation:    Tobacco Use    Smoking status: Never    Smokeless tobacco: Never   Substance and Sexual Activity    Alcohol use: No    Drug use: No    Sexual activity: Yes     Partners: Male     Birth control/protection: None     Social Drivers of Health     Financial Resource Strain: Low Risk  (7/9/2024)    Overall Financial Resource Strain (CARDIA)     Difficulty of Paying Living Expenses: Not hard at all   Food Insecurity: No Food Insecurity (7/9/2024)    Hunger Vital Sign     Worried About Running Out of Food in the Last Year: Never true     Ran Out of Food in the Last Year: Never true   Transportation Needs: No Transportation Needs (2/7/2023)    PRAPARE - Transportation     Lack of Transportation (Medical): No     Lack of Transportation (Non-Medical): No   Physical Activity: Unknown (7/9/2024)    Exercise Vital Sign     Days of Exercise per Week: 2 days   Stress: Stress Concern Present (7/9/2024)    [employer] - Occupational Stress Questionnaire     Feeling of Stress : To some extent   Housing Stability: Unknown (2/7/2023)    Housing Stability Vital Sign     Unable to Pay for Housing in the Last Year: No     Unstable Housing in the Last Year: No     Current Outpatient Medications   Medication Sig Dispense Refill    finasteride (PROSCAR) 5 mg tablet Take 1 tablet (5 mg total) by mouth once daily. 30 tablet 11    fluocinonide (LIDEX) 0.05 % external solution APPLY  SOLUTION TOPICALLY TWICE DAILY 60 mL 0    minoxidiL (ROGAINE) 2 % external solution Apply topically 2 (two) times daily. 60 mL 6    plecanatide (TRULANCE) 3 mg Tab Take 1 tablet by mouth once daily.      ALPRAZolam (XANAX) 0.5 MG tablet Take 1 tablet (0.5 mg total) by mouth 2  (two) times daily as needed for Anxiety. 60 tablet 2    buPROPion (WELLBUTRIN XL) 300 MG 24 hr tablet Take 1 tablet (300 mg total) by mouth once daily. 30 tablet 5    ibuprofen (ADVIL,MOTRIN) 800 MG tablet Take 1 tablet (800 mg total) by mouth 3 (three) times daily as needed for Pain. 30 tablet 5     No current facility-administered medications for this visit.     Review of patient's allergies indicates:  No Known Allergies   Past Medical History:   Diagnosis Date    Anxiety     Chest pain     Herpes simplex without mention of complication     History of Amplatzer atrial septal defect closure 2016    History of atrial septal defect 2016    Migraine     PONV (postoperative nausea and vomiting)     Sickle cell trait     Vaso vagal episode     Vasovagal syncope     last      Past Surgical History:   Procedure Laterality Date    CARDIAC CATHETERIZATION  2006    ASD closure     SECTION  ,,    COLONOSCOPY      HYSTEROSCOPY WITH DILATION AND CURETTAGE OF UTERUS N/A 2020    Procedure: HYSTEROSCOPY, WITH DILATION AND CURETTAGE OF UTERUS;  Surgeon: Julissa Munoz MD;  Location: [facility] OR;  Service: OB/GYN;  Laterality: N/A;    THERMAL ABLATION OF ENDOMETRIUM USING HYSTEROSCOPY N/A 2020    Procedure: ABLATION, ENDOMETRIUM, THERMAL, HYSTEROSCOPIC - Novasure;  Surgeon: Julissa Munoz MD;  Location: [facility] OR;  Service: OB/GYN;  Laterality: N/A;    TUBAL LIGATION         Review of Systems   Constitutional:  Positive for fatigue and weight loss. Negative for activity change, appetite change and unexpected weight change.   HENT:  Negative for congestion, ear pain, hearing loss, postnasal drip, sinus pressure, sinus pain, sneezing and sore throat.    Eyes:  Negative for photophobia and pain.   Respiratory:  Negative for cough, chest tightness, shortness of breath and wheezing.    Cardiovascular:  Positive for chest pain. Negative for palpitations and leg swelling.    Gastrointestinal:  Negative for abdominal distention, abdominal pain, blood in stool, constipation, diarrhea, nausea and vomiting.   Endocrine: Negative for cold intolerance, heat intolerance, polydipsia and polyuria.   Genitourinary:  Negative for difficulty urinating, dysuria, flank pain, frequency, hematuria, pelvic pain and urgency.   Musculoskeletal:  Positive for arthralgias, back pain, myalgias and neck pain. Negative for joint swelling.   Skin:  Negative for pallor.   Allergic/Immunologic: Negative for environmental allergies and food allergies.   Neurological:  Positive for headaches. Negative for dizziness, weakness, light-headedness and numbness.   Hematological:  Does not bruise/bleed easily.   Psychiatric/Behavioral:  Positive for decreased concentration, dysphoric mood and sleep disturbance. Negative for agitation and confusion. The patient is nervous/anxious and has insomnia.       OBJECTIVE:      Vitals:    10/21/24 1415   BP: 120/62   BP Location: Right arm   Patient Position: Sitting   Pulse: 97   SpO2: 98%   Weight: 75.3 kg (166 lb)     Physical Exam  Vitals and nursing note reviewed.   Constitutional:       General: She is not in acute distress.     Appearance: Normal appearance. She is well-developed, well-groomed and overweight.      Comments: 26# loss since July visit   HENT:      Head: Normocephalic and atraumatic.      Right Ear: Hearing normal.      Left Ear: Hearing normal.      Nose: Nose normal. No rhinorrhea.   Eyes:      General: Lids are normal.         Right eye: No discharge.         Left eye: No discharge.      Conjunctiva/sclera: Conjunctivae normal.      Right eye: Right conjunctiva is not injected.      Left eye: Left conjunctiva is not injected.      Pupils: Pupils are equal, round, and reactive to light. Pupils are equal.      Right eye: Pupil is round and reactive.      Left eye: Pupil is round and reactive.   Neck:      Thyroid: No thyromegaly.      Vascular: No JVD.       Trachea: Trachea normal. No tracheal deviation.   Cardiovascular:      Rate and Rhythm: Normal rate and regular rhythm.      Pulses:           Radial pulses are 2+ on the right side and 2+ on the left side.        Posterior tibial pulses are 2+ on the right side and 2+ on the left side.      Heart sounds: Normal heart sounds. No murmur heard.     No friction rub. No gallop.   Pulmonary:      Effort: Pulmonary effort is normal. No respiratory distress.      Breath sounds: Normal breath sounds. No stridor. No decreased breath sounds, wheezing, rhonchi or rales.   Chest:      Chest wall: Tenderness (sternum) present.   Abdominal:      General: Bowel sounds are normal. There is no distension.      Palpations: Abdomen is soft. Abdomen is not rigid.      Tenderness: There is no abdominal tenderness. There is no guarding.   Musculoskeletal:         General: Normal range of motion.      Cervical back: Normal range of motion and neck supple. Tenderness present.      Thoracic back: Tenderness present.   Lymphadenopathy:      Cervical: No cervical adenopathy.   Skin:     General: Skin is warm and dry.      Capillary Refill: Capillary refill takes less than 2 seconds.      Coloration: Skin is not pale.      Findings: No lesion or rash.   Neurological:      Mental Status: She is alert and oriented to person, place, and time.      GCS: GCS eye subscore is 4. GCS verbal subscore is 5. GCS motor subscore is 6.      Cranial Nerves: Cranial nerves 2-12 are intact.      Sensory: Sensation is intact.      Motor: Motor function is intact. No atrophy.      Coordination: Coordination is intact. Coordination normal.      Gait: Gait is intact. Gait normal.   Psychiatric:         Attention and Perception: Attention and perception normal. She is attentive.         Mood and Affect: Affect normal. Mood is anxious.         Speech: Speech normal.         Behavior: Behavior normal.         Thought Content: Thought content normal.         Cognition  and Memory: Cognition and memory normal.         Judgment: Judgment normal.        Assessment:       1. Costochondritis    2. Acute neck pain    3. Acute left-sided thoracic back pain    4. Anxiety    5. Weight gain        Plan:       Costochondritis  -     ibuprofen (ADVIL,MOTRIN) 800 MG tablet; Take 1 tablet (800 mg total) by mouth 3 (three) times daily as needed for Pain.  Dispense: 30 tablet; Refill: 5    Acute neck pain  -     X-Ray Cervical Spine 2 or 3 Views; Future; Expected date: 10/21/2024    Acute left-sided thoracic back pain  -     CBC Auto Differential; Future; Expected date: 10/21/2024  -     Comprehensive Metabolic Panel; Future; Expected date: 10/21/2024  -     X-Ray Thoracic Spine AP Lateral; Future; Expected date: 10/21/2024    Anxiety  -     ALPRAZolam (XANAX) 0.5 MG tablet; Take 1 tablet (0.5 mg total) by mouth 2 (two) times daily as needed for Anxiety.  Dispense: 60 tablet; Refill: 2    Weight gain  -     buPROPion (WELLBUTRIN XL) 300 MG 24 hr tablet; Take 1 tablet (300 mg total) by mouth once daily.  Dispense: 30 tablet; Refill: 5                    Follow up if symptoms worsen or fail to improve. Already scheduled for next visit.      10/23/2024 AMALIA Gonzalez, [id number]

## 2024-11-22 ENCOUNTER — PATIENT MESSAGE (OUTPATIENT)
Dept: RESEARCH | Facility: HOSPITAL | Age: 34
End: 2024-11-22
Payer: COMMERCIAL

## 2025-05-19 ENCOUNTER — LAB VISIT (OUTPATIENT)
Dept: LAB | Facility: HOSPITAL | Age: 35
End: 2025-05-19
Attending: NURSE PRACTITIONER
Payer: COMMERCIAL

## 2025-05-19 DIAGNOSIS — M54.6 ACUTE LEFT-SIDED THORACIC BACK PAIN: ICD-10-CM

## 2025-05-19 DIAGNOSIS — Z00.00 ROUTINE GENERAL MEDICAL EXAMINATION AT A HEALTH CARE FACILITY: ICD-10-CM

## 2025-05-19 LAB
CHOLEST SERPL-MCNC: 143 MG/DL (ref 120–199)
CHOLEST/HDLC SERPL: 2.4 {RATIO} (ref 2–5)
HDLC SERPL-MCNC: 59 MG/DL (ref 40–75)
HDLC SERPL: 41.3 % (ref 20–50)
LDLC SERPL CALC-MCNC: 77.8 MG/DL (ref 63–159)
NONHDLC SERPL-MCNC: 84 MG/DL
TRIGL SERPL-MCNC: 31 MG/DL (ref 30–150)

## 2025-05-19 PROCEDURE — 80061 LIPID PANEL: CPT

## 2025-05-19 PROCEDURE — 36415 COLL VENOUS BLD VENIPUNCTURE: CPT | Mod: PN

## 2025-05-29 ENCOUNTER — RESULTS FOLLOW-UP (OUTPATIENT)
Dept: FAMILY MEDICINE | Facility: CLINIC | Age: 35
End: 2025-05-29

## 2025-07-08 RX ORDER — VALACYCLOVIR HYDROCHLORIDE 1 G/1
1000 TABLET, FILM COATED ORAL
COMMUNITY